# Patient Record
Sex: FEMALE | Race: WHITE | Employment: OTHER | URBAN - METROPOLITAN AREA
[De-identification: names, ages, dates, MRNs, and addresses within clinical notes are randomized per-mention and may not be internally consistent; named-entity substitution may affect disease eponyms.]

---

## 2021-07-19 ENCOUNTER — VIRTUAL VISIT (OUTPATIENT)
Dept: PRIMARY CARE CLINIC | Age: 61
End: 2021-07-19
Payer: COMMERCIAL

## 2021-07-19 ENCOUNTER — NURSE ONLY (OUTPATIENT)
Dept: FAMILY MEDICINE CLINIC | Age: 61
End: 2021-07-19

## 2021-07-19 DIAGNOSIS — R30.0 DYSURIA: ICD-10-CM

## 2021-07-19 DIAGNOSIS — R50.9 FEVER, UNSPECIFIED FEVER CAUSE: ICD-10-CM

## 2021-07-19 DIAGNOSIS — R30.0 DYSURIA: Primary | ICD-10-CM

## 2021-07-19 DIAGNOSIS — R50.9 FEVER, UNSPECIFIED FEVER CAUSE: Primary | ICD-10-CM

## 2021-07-19 DIAGNOSIS — Z20.822 COVID-19 RULED OUT BY LABORATORY TESTING: ICD-10-CM

## 2021-07-19 DIAGNOSIS — N30.01 ACUTE CYSTITIS WITH HEMATURIA: Primary | ICD-10-CM

## 2021-07-19 PROBLEM — M54.50 CHRONIC LOW BACK PAIN: Status: ACTIVE | Noted: 2017-11-01

## 2021-07-19 PROBLEM — G89.29 CHRONIC LOW BACK PAIN: Status: ACTIVE | Noted: 2017-11-01

## 2021-07-19 LAB
BILIRUBIN, POC: ABNORMAL
BLOOD URINE, POC: ABNORMAL
CLARITY, POC: CLEAR
COLOR, POC: YELLOW
GLUCOSE URINE, POC: ABNORMAL
KETONES, POC: ABNORMAL
LEUKOCYTE EST, POC: ABNORMAL
NITRITE, POC: ABNORMAL
PH, POC: 6
PROTEIN, POC: ABNORMAL
SPECIFIC GRAVITY, POC: 1.03
UROBILINOGEN, POC: 0.2

## 2021-07-19 PROCEDURE — 99213 OFFICE O/P EST LOW 20 MIN: CPT | Performed by: NURSE PRACTITIONER

## 2021-07-19 PROCEDURE — 81003 URINALYSIS AUTO W/O SCOPE: CPT | Performed by: NURSE PRACTITIONER

## 2021-07-19 RX ORDER — NITROFURANTOIN 25; 75 MG/1; MG/1
100 CAPSULE ORAL 2 TIMES DAILY
Qty: 10 CAPSULE | Refills: 0 | Status: SHIPPED | OUTPATIENT
Start: 2021-07-19 | End: 2021-07-24

## 2021-07-19 RX ORDER — CELECOXIB 200 MG/1
200 CAPSULE ORAL 2 TIMES DAILY
COMMUNITY
Start: 2020-12-14 | End: 2022-09-22 | Stop reason: ALTCHOICE

## 2021-07-19 SDOH — ECONOMIC STABILITY: FOOD INSECURITY: WITHIN THE PAST 12 MONTHS, THE FOOD YOU BOUGHT JUST DIDN'T LAST AND YOU DIDN'T HAVE MONEY TO GET MORE.: NEVER TRUE

## 2021-07-19 SDOH — ECONOMIC STABILITY: FOOD INSECURITY: WITHIN THE PAST 12 MONTHS, YOU WORRIED THAT YOUR FOOD WOULD RUN OUT BEFORE YOU GOT MONEY TO BUY MORE.: NEVER TRUE

## 2021-07-19 SDOH — ECONOMIC STABILITY: TRANSPORTATION INSECURITY
IN THE PAST 12 MONTHS, HAS LACK OF TRANSPORTATION KEPT YOU FROM MEETINGS, WORK, OR FROM GETTING THINGS NEEDED FOR DAILY LIVING?: NO

## 2021-07-19 SDOH — ECONOMIC STABILITY: TRANSPORTATION INSECURITY
IN THE PAST 12 MONTHS, HAS THE LACK OF TRANSPORTATION KEPT YOU FROM MEDICAL APPOINTMENTS OR FROM GETTING MEDICATIONS?: NO

## 2021-07-19 ASSESSMENT — ENCOUNTER SYMPTOMS
TROUBLE SWALLOWING: 0
VOMITING: 0
DIARRHEA: 0
ABDOMINAL DISTENTION: 0
EYE PAIN: 0
EYE ITCHING: 0
VOICE CHANGE: 0
COUGH: 0
SORE THROAT: 0
CHEST TIGHTNESS: 0
NAUSEA: 0
EYE DISCHARGE: 0
SINUS PAIN: 0
ABDOMINAL PAIN: 0
EYE REDNESS: 0
SINUS PRESSURE: 0
SHORTNESS OF BREATH: 0
WHEEZING: 0
RHINORRHEA: 0

## 2021-07-19 ASSESSMENT — PATIENT HEALTH QUESTIONNAIRE - PHQ9
SUM OF ALL RESPONSES TO PHQ QUESTIONS 1-9: 0
2. FEELING DOWN, DEPRESSED OR HOPELESS: 0
SUM OF ALL RESPONSES TO PHQ9 QUESTIONS 1 & 2: 0
1. LITTLE INTEREST OR PLEASURE IN DOING THINGS: 0
SUM OF ALL RESPONSES TO PHQ QUESTIONS 1-9: 0
SUM OF ALL RESPONSES TO PHQ QUESTIONS 1-9: 0

## 2021-07-19 ASSESSMENT — SOCIAL DETERMINANTS OF HEALTH (SDOH): HOW HARD IS IT FOR YOU TO PAY FOR THE VERY BASICS LIKE FOOD, HOUSING, MEDICAL CARE, AND HEATING?: NOT HARD AT ALL

## 2021-07-19 NOTE — LETTER
NOTIFICATION RETURN TO WORK / SCHOOL    7/19/2021    Ms. Juan Herrmann [id number]      To Whom It May Concern:    Merline Lolling was tested for COVID-19 on 7/19, and we are awaiting results. She needs to remain off work until results are back. If there are questions or concerns, please have the patient contact our office.         Sincerely,      Patricia Bledsoe, GIO - CNP

## 2021-07-19 NOTE — PATIENT INSTRUCTIONS
Patient Education        Learning About Fever  What is a fever? A fever is a high body temperature. It's one way your body fights being sick. A fever shows that the body is responding to infection or other illnesses, both minor and severe. A fever is a symptom, not an illness by itself. A fever can be a sign that you are ill, but most fevers are not caused by a serious problem. You may have a fever with a minor illness, such as a cold. But sometimes a very serious infection may cause little or no fever. It is important to look at other symptoms, other conditions you have, and how you feel in general. In children, notice how they act and see what symptoms they complain of. What is a normal body temperature? A normal body temperature is about 98. 6ºF. Some people have a normal temperature that is a little higher or a little lower than this. Your temperature may be a little lower in the morning than it is later in the day. It may go up during hot weather or when you exercise, wear heavy clothes, or take a hot bath. Your temperature may also be different depending on how you take it. A temperature taken in the mouth (oral) or under the arm may be a little lower than your core temperature (rectal). What is a fever temperature? A core temperature of 100.4°F or above is considered a fever. What can cause a fever? A fever may be caused by:  · Infections. This is the most common cause of a fever. Examples of infections that can cause a fever include the flu, a kidney infection, or pneumonia. · Some medicines. · Severe trauma or injury, such as a heart attack, stroke, heatstroke, or burns. · Other medical conditions, such as arthritis and some cancers. How can you treat a fever at home? · Ask your doctor if you can take an over-the-counter pain medicine, such as acetaminophen (Tylenol), ibuprofen (Advil, Motrin), or naproxen (Aleve). Be safe with medicines.  Read and follow all instructions on the label.  · To prevent dehydration, drink plenty of fluids. Choose water and other caffeine-free clear liquids until you feel better. If you have kidney, heart, or liver disease and have to limit fluids, talk with your doctor before you increase the amount of fluids you drink. Follow-up care is a key part of your treatment and safety. Be sure to make and go to all appointments, and call your doctor if you are having problems. It's also a good idea to know your test results and keep a list of the medicines you take. Where can you learn more? Go to https://AuthypeEpivioseb.99designs. org and sign in to your [employer] account. Enter A854 in the [facility] box to learn more about \"Learning About Fever. \"     If you do not have an account, please click on the \"Sign Up Now\" link. Current as of: October 19, 2020               Content Version: 12.9  © 2006-2021 [employer]. Care instructions adapted under license by [facility] ([facility]). If you have questions about a medical condition or this instruction, always ask your healthcare professional. [place] [address] any warranty or liability for your use of this information. Patient Education        Painful Urination (Dysuria): Care Instructions  Your Care Instructions  Burning pain with urination (dysuria) is a common symptom of a urinary tract infection or other urinary problems. The bladder may become inflamed. This can cause pain when the bladder fills and empties. You may also feel pain if the tube that carries urine from the bladder to the outside of the body (urethra) gets irritated or infected. Sexually transmitted infections (STIs) also may cause pain when you urinate. Sometimes the pain can be caused by things other than an infection. The urethra can be irritated by soaps, perfumes, or foreign objects in the urethra. Kidney stones can cause pain when they pass through the urethra. The cause may be hard to find.  You may need tests. Treatment for painful urination depends on the cause. Follow-up care is a key part of your treatment and safety. Be sure to make and go to all appointments, and call your doctor if you are having problems. It's also a good idea to know your test results and keep a list of the medicines you take. How can you care for yourself at home? · Drink extra water for the next day or two. This will help make the urine less concentrated. (If you have kidney, heart, or liver disease and have to limit fluids, talk with your doctor before you increase the amount of fluids you drink.)  · Avoid drinks that are carbonated or have caffeine. They can irritate the bladder. · Urinate often. Try to empty your bladder each time. For women:  · Urinate right after you have sex. · After going to the bathroom, wipe from front to back. · Avoid douches, bubble baths, and feminine hygiene sprays. And avoid other feminine hygiene products that have deodorants. When should you call for help? Call your doctor now or seek immediate medical care if:    · You have new symptoms, such as fever, nausea, or vomiting.     · You have new or worse symptoms of a urinary problem. For example:  ? You have blood or pus in your urine. ? You have chills or body aches. ? It hurts worse to urinate. ? You have groin or belly pain. ? You have pain in your back just below your rib cage (the flank area). Watch closely for changes in your health, and be sure to contact your doctor if you have any problems. Where can you learn more? Go to https://engageSimplyianSunPods.Greendizer. org and sign in to your [employer] account. Enter P450 in the [facility] box to learn more about \"Painful Urination (Dysuria): Care Instructions. \"     If you do not have an account, please click on the \"Sign Up Now\" link. Current as of: February 10, 2021               Content Version: 12.9  © 6953-8651 Healthwise, Incorporated.    Care instructions adapted under license by [facility] ([facility]). If you have questions about a medical condition or this instruction, always ask your healthcare professional. Jessica Croft any warranty or liability for your use of this information. We'll call with COVID-19 results  Results will also be available on your [facility] account, if activated  Things to Know:   - Do not leave home except to get medical care while waiting for your results  - Testing does not change treatment--> there is no medication that treats COVID-19  - Drink plenty of fluids and rest as much as needed  - May take over the counter medicine such as ibuprofen (aka Motrin/ Advil) or acetaminophen (aka Tylenol) for pain or fever, follow directions on label  - Continually monitor symptoms + contact a medical provider if symptoms are worsening, such as difficulty breathing  - Avoid exposure to environmental irritants/ allergens where possible    - Practice social distancing and wear a face mask when leaving home is necessary  - Symptoms may develop 2 days to 2 weeks following exposure to the virus- if you are exposed after testing, or if you were in the incubation period when tested, you could become ill with COVID-19 later    Keep a low threshold to go to ER or call 9-1-1 for new or suddenly worsening symptoms --> trouble breathing, high fevers that are unresponsive to fever-reducing medications, difficulty staying awake, vomiting/ unable to keep food or fluids down, any other symptoms that you think could be an emergency. Patient Education        Painful Urination (Dysuria): Care Instructions  Your Care Instructions  Burning pain with urination (dysuria) is a common symptom of a urinary tract infection or other urinary problems. The bladder may become inflamed. This can cause pain when the bladder fills and empties.  You may also feel pain if the tube that carries urine from the bladder to the outside of the body (urethra) gets irritated or infected. Sexually transmitted infections (STIs) also may cause pain when you urinate. Sometimes the pain can be caused by things other than an infection. The urethra can be irritated by soaps, perfumes, or foreign objects in the urethra. Kidney stones can cause pain when they pass through the urethra. The cause may be hard to find. You may need tests. Treatment for painful urination depends on the cause. Follow-up care is a key part of your treatment and safety. Be sure to make and go to all appointments, and call your doctor if you are having problems. It's also a good idea to know your test results and keep a list of the medicines you take. How can you care for yourself at home? · Drink extra water for the next day or two. This will help make the urine less concentrated. (If you have kidney, heart, or liver disease and have to limit fluids, talk with your doctor before you increase the amount of fluids you drink.)  · Avoid drinks that are carbonated or have caffeine. They can irritate the bladder. · Urinate often. Try to empty your bladder each time. For women:  · Urinate right after you have sex. · After going to the bathroom, wipe from front to back. · Avoid douches, bubble baths, and feminine hygiene sprays. And avoid other feminine hygiene products that have deodorants. When should you call for help? Call your doctor now or seek immediate medical care if:    · You have new symptoms, such as fever, nausea, or vomiting.     · You have new or worse symptoms of a urinary problem. For example:  ? You have blood or pus in your urine. ? You have chills or body aches. ? It hurts worse to urinate. ? You have groin or belly pain. ? You have pain in your back just below your rib cage (the flank area). Watch closely for changes in your health, and be sure to contact your doctor if you have any problems. Where can you learn more? Go to https://jagdish.health-partners. org and sign in to your [employer] account. Enter E904 in the [facility] box to learn more about \"Painful Urination (Dysuria): Care Instructions. \"     If you do not have an account, please click on the \"Sign Up Now\" link. Current as of: February 10, 2021               Content Version: 12.9  © 5343-7187 Healthwise, Incorporated. Care instructions adapted under license by [facility] ([facility]). If you have questions about a medical condition or this instruction, always ask your healthcare professional. [address] any warranty or liability for your use of this information.

## 2021-07-19 NOTE — PROGRESS NOTES
TELEHEALTH EVALUATION -- Audio/Visual (During XHKIW- public health emergency)    -   Cadence Min is a 64 y.o. female being evaluated by a Virtual Visit (video visit) encounter to address concerns as mentioned above. A caregiver was present when appropriate. Due to this being a TeleHealth encounter (During BQSYE-58 public health emergency), evaluation of the following organ systems was limited: Vitals/Constitutional/EENT/Resp/CV/GI//MS/Neuro/Skin/Heme-Lymph-Imm. Pursuant to the emergency declaration under the [facility], [address] [place] [address] and the [employer] Act, this Virtual Visit was conducted with patient's (and/or legal guardian's) consent, to reduce the patient's risk of exposure to COVID-19 and provide necessary medical care. The patient (and/or legal guardian) has also been advised to contact this office for worsening conditions or problems, and seek emergency medical treatment and/or call 911 if deemed necessary. Patient was contacted and agreed to proceed with a virtual visit via Doxy. me  The risks and benefits of converting to a virtual visit were discussed in light of the current infectious disease epidemic. Patient also understood that insurance coverage and co-pays are up to their individual insurance plans. Patient was located at their home. Provider was located at their office. 2021  Cadence Min (:  1960) has requested an audio/video evaluation for the following concern(s):    HPI  Patient is on VV for c/o chills and fever since Saturday. Fever .8. Says she has no other URI symptoms. She denies cough or SOB. Denies loss of taste and smell. She has no GI symptoms. She has had low back aches the last few days and frequency with no other urinary or vaginal symptoms. Says  has had GI symptoms and fever.    Says she has not had Covid that she is aware of and she has not been vaccinated. Review of Systems   Constitutional: Positive for chills, diaphoresis, fatigue and fever. Negative for activity change, appetite change and unexpected weight change. HENT: Negative for congestion, dental problem, ear discharge, ear pain, postnasal drip, rhinorrhea, sinus pressure, sinus pain, sneezing, sore throat, trouble swallowing and voice change. Eyes: Negative for pain, discharge, redness and itching. Respiratory: Negative for cough, chest tightness, shortness of breath and wheezing. Cardiovascular: Negative for chest pain. Gastrointestinal: Negative for abdominal distention, abdominal pain, diarrhea, nausea and vomiting. Genitourinary: Positive for frequency and pelvic pain. Negative for decreased urine volume, difficulty urinating, dyspareunia, dysuria, enuresis, flank pain, genital sores, hematuria, menstrual problem, urgency, vaginal bleeding, vaginal discharge and vaginal pain. Musculoskeletal: Positive for myalgias. Negative for arthralgias. Neurological: Negative for dizziness, weakness, light-headedness and headaches. Psychiatric/Behavioral: Negative for confusion. Prior to Visit Medications    Medication Sig Taking? Authorizing Provider   celecoxib (CELEBREX) 200 MG capsule Take 200 mg by mouth 2 times daily Yes Historical Provider, MD       Past Medical History:   Diagnosis Date    Chronic back pain      History reviewed. No pertinent surgical history.   Social History     Socioeconomic History    Marital status:      Spouse name: Not on file    Number of children: Not on file    Years of education: Not on file    Highest education level: Not on file   Occupational History    Not on file   Tobacco Use    Smoking status: Never Smoker    Smokeless tobacco: Never Used   Substance and Sexual Activity    Alcohol use: Never    Drug use: Never    Sexual activity: Not on file   Other Topics Neck: [x] No visualized mass     Pulmonary/Chest: [x] Respiratory effort normal.  [x] No visualized signs of difficulty breathing or respiratory distress        [] Abnormal-      Musculoskeletal:   [x] Normal gait with no signs of ataxia         [x] Normal range of motion of neck        [] Abnormal-       Neurological:       [x] No Facial Asymmetry (Cranial nerve 7 motor function) (limited exam to video visit)          [x] No gaze palsy        [] Abnormal-         Skin:        [x] No significant exanthematous lesions or discoloration noted on facial skin         [] Abnormal-            Psychiatric:       [x] Normal Affect [x] No Hallucinations        [] Abnormal-     Other pertinent observable physical exam findings-   Results for orders placed or performed in visit on 07/19/21   POCT Urinalysis No Micro (Auto)   Result Value Ref Range    Color, UA yellow     Clarity, UA clear     Glucose, UA POC n     Bilirubin, UA n     Ketones, UA n     Spec Grav, UA 1.030     Blood, UA POC +     pH, UA 6.0     Protein, UA POC +     Urobilinogen, UA 0.2     Leukocytes, UA n     Nitrite, UA sm        ASSESSMENT/PLAN:  Assessment & Plan   Laila Esquivel was seen today for fever. Diagnoses and all orders for this visit:    Fever, unspecified fever cause  -     COVID-19; Future    Dysuria  -     POCT Urinalysis No Micro (Auto)      Orders Placed This Encounter   Procedures    COVID-19     Standing Status:   Future     Standing Expiration Date:   7/19/2022     Scheduling Instructions:      1) Due to current limited availability of the COVID-19 test, tests will be prioritized based on responses to questions above. Testing may be delayed due to volume. 2) Print and instruct patient to adhere to CDC home isolation program. (Link Above)              3) Set up or refer patient for a monitoring program.              4) Have patient sign up for and leverage MyChart (if not previously done).      Order Specific Question:   Is this test for diagnosis or screening? Answer:   Diagnosis of ill patient     Order Specific Question:   Symptomatic for COVID-19 as defined by CDC? Answer:   Yes     Order Specific Question:   Date of Symptom Onset     Answer:   7/17/2021     Order Specific Question:   Hospitalized for COVID-19? Answer:   No     Order Specific Question:   Admitted to ICU for COVID-19? Answer:   No     Order Specific Question:   Employed in healthcare setting? Answer:   No     Order Specific Question:   Resident in a congregate (group) care setting? Answer:   No     Order Specific Question:   Pregnant? Answer:   No     Order Specific Question:   Previously tested for COVID-19? Answer: Yes    POCT Urinalysis No Micro (Auto)     No orders of the defined types were placed in this encounter. There are no discontinued medications. No follow-ups on file. Reviewed with the patient: current clinical status, medications, activities and diet. Side effects, adverse effects of the medication prescribed today, as well as treatment plan/ rationale and result expectations have been discussed with the patient who expresses understanding and desires to proceed. Close follow up to evaluate treatment results and for coordination of care. I have reviewed the patient's medical history in detail and updated the computerized patient record. Patient identification was verified at the start of the visit: Yes    Total time spent on this encounter: Not billed by time      --GIO Hoover CNP on 7/19/2021 at 4:16 PM    An electronic signature was used to authenticate this note.

## 2021-07-20 LAB — SARS-COV-2, PCR: NOT DETECTED

## 2021-07-21 ENCOUNTER — TELEPHONE (OUTPATIENT)
Dept: FAMILY MEDICINE CLINIC | Age: 61
End: 2021-07-21

## 2021-07-21 NOTE — TELEPHONE ENCOUNTER
Hi there I did not see the pt but if she is still having fevers she is still considered infectious and until she is fever free for 24 hrs without any Tylenol or Ibuprofen then she may return back to work

## 2021-07-21 NOTE — TELEPHONE ENCOUNTER
Pt is calling for a return to work note. She was seen by Eva Gannon at the [facility] in. Pt is taking the antibiotic. She is still having fevers and think its from the UTI. Pt was tested for COVID and it was negative. When can she return back to work? Pt wants to go back to work tomorrow. She works at the [employer]. Eva Gannon is off today at all locations, are you able to do the return to work note?      Pt# [phone]

## 2021-07-22 ENCOUNTER — TELEPHONE (OUTPATIENT)
Dept: PRIMARY CARE CLINIC | Age: 61
End: 2021-07-22

## 2021-07-22 LAB
ORGANISM: ABNORMAL
URINE CULTURE, ROUTINE: ABNORMAL

## 2021-07-27 NOTE — TELEPHONE ENCOUNTER
She is asking for her urine culture results.
This was addressed by ZULAY on the urine culture result.
Detail Level: Simple
Detail Level: Detailed

## 2021-08-02 NOTE — PROGRESS NOTES
8/3/2021    Asim Oliveira (:  1960) is a 64 y.o. female, here for evaluation of the following medical concerns:  Chief Complaint   Patient presents with   [place] Establish Care    Abdominal Pain     Abdominal pain left side, and lower left back pain. Unsure if she still has a UTI.  Sweats     Night.  Mole     Left Buttock, recently changed in size and color.  Health Maintenance     Yes for colonoscopy, yes for mammogram (has implants), and yes for CT.  Anxiety    Letter for School/Work     Not to have to work more than 8hrs a day, or 40 hrs a week. HPI  Establishing care  [facility], last seen about 1 year ago  Past medical history: none    UTI  Diagnosed 2 weeks ago  Finished abx about a week ago  Denied dysuria, frequency or urgency  Only had flank pain when she was diagnosed  Still having back pain  No injury to the back  Does work at [employer] and lifts a lot   Has not been doing any NSAIDs or icing    Night sweats  Occurring for one year  Almost every night  No symptoms during the daytime  Drenched in sweat  Hysterectomy when she was 30 - with ovary removal    Skin lesion  Left buttocks  Feels like lesion has been changing in color over the last few months    Anxiety  States she has had more issues that she is gotten older  Anxiety is not on a daily basis  Was previously on Xanax as needed    Review of Systems   Constitutional: Negative for chills, fatigue and fever. Night sweats   HENT: Negative for congestion, sinus pressure and sore throat. Respiratory: Negative for cough and shortness of breath. Cardiovascular: Negative for chest pain and palpitations. Gastrointestinal: Negative for abdominal pain and vomiting. Musculoskeletal: Negative for arthralgias and myalgias. Left-sided lumbar paraspinal muscular pain   Skin: Negative for rash and wound.         Skin lesion left gluteal region   Neurological: Negative for speech difficulty and light-headedness. Psychiatric/Behavioral: Negative for suicidal ideas. The patient is not nervous/anxious. Prior to Visit Medications    Medication Sig Taking? Authorizing Provider   hydrOXYzine (VISTARIL) 25 MG capsule Take 1 capsule by mouth 3 times daily as needed for Anxiety Yes Arch Roles, DO   celecoxib (CELEBREX) 200 MG capsule Take 200 mg by mouth 2 times daily Yes Historical Provider, MD        No Known Allergies    Past Medical History:   Diagnosis Date    Chronic back pain        Past Surgical History:   Procedure Laterality Date     SECTION      HYSTERECTOMY, TOTAL ABDOMINAL      OVARY REMOVAL         Social History     Socioeconomic History    Marital status:      Spouse name: Not on file    Number of children: Not on file    Years of education: Not on file    Highest education level: Not on file   Occupational History    Not on file   Tobacco Use    Smoking status: Current Every Day Smoker     Packs/day: 1.00     Years: 30.00     Pack years: 30.00    Smokeless tobacco: Never Used   Substance and Sexual Activity    Alcohol use: Never    Drug use: Never    Sexual activity: Not on file   Other Topics Concern    Not on file   Social History Narrative    Not on file     Social Determinants of Health     Financial Resource Strain: Low Risk     Difficulty of Paying Living Expenses: Not hard at all   Food Insecurity: No Food Insecurity    Worried About Running Out of Food in the Last Year: Never true    Pedro of Food in the Last Year: Never true   Transportation Needs: No Transportation Needs    Lack of Transportation (Medical): No    Lack of Transportation (Non-Medical):  No   Physical Activity:     Days of Exercise per Week:     Minutes of Exercise per Session:    Stress:     Feeling of Stress :    Social Connections:     Frequency of Communication with Friends and Family:     Frequency of Social Gatherings with Friends and Family:     Attends Protestant Services:     Active Member of Clubs or Organizations:     Attends Club or Organization Meetings:     Marital Status:    Intimate Partner Violence:     Fear of Current or Ex-Partner:     Emotionally Abused:     Physically Abused:     Sexually Abused:         Family History   Problem Relation Age of Onset    Heart Attack Father     Stroke Father     Prostate Cancer Brother     Cancer Maternal Grandmother     Stroke Maternal Grandmother     Cancer Maternal Grandfather     Diabetes Maternal Grandfather     Kidney Disease Neg Hx        Vitals:    08/03/21 1514   BP: 122/80   Pulse: 84   Temp: 96.2 °F (35.7 °C)   SpO2: 96%   Weight: 132 lb (59.9 kg)   Height: 5' 7\" (1.702 m)       Estimated body mass index is 20.67 kg/m² as calculated from the following:    Height as of this encounter: 5' 7\" (1.702 m). Weight as of this encounter: 132 lb (59.9 kg). No results for input(s): WBC, RBC, HGB, HCT, MCV, MCH, MCHC, RDW, PLT, MPV in the last 72 hours. No results for input(s): NA, K, CL, CO2, BUN, CREATININE, GLUCOSE, CALCIUM, PROT, LABALBU, BILITOT, ALKPHOS, AST, ALT in the last 72 hours. No results found for: LABA1C    No results found. Physical Exam  Constitutional:       Appearance: Normal appearance. She is normal weight. HENT:      Head: Normocephalic and atraumatic. Eyes:      Extraocular Movements: Extraocular movements intact. Conjunctiva/sclera: Conjunctivae normal.   Cardiovascular:      Rate and Rhythm: Normal rate and regular rhythm. Pulses: Normal pulses. Heart sounds: Normal heart sounds. Pulmonary:      Effort: Pulmonary effort is normal.      Breath sounds: Normal breath sounds. No wheezing or rales. Musculoskeletal:         General: Tenderness (Left lumbar paraspinal musculature) present. Skin:     General: Skin is warm. Capillary Refill: Capillary refill takes less than 2 seconds. Findings: No rash.       Comments: Left lower buttocks: Dark brown to black irregularly appearing macule   Neurological:      Mental Status: She is alert. Psychiatric:         Mood and Affect: Mood normal.         Thought Content: Thought content normal.         Judgment: Judgment normal.         ASSESSMENT/PLAN:  1. Anxiety  -Patient does not want to be on any daily medications to help with anxiety as she states her anxiety occurs sporadically  -We will try Vistaril as needed   - Declined psychology referral    - TSH with Reflex; Future  - hydrOXYzine (VISTARIL) 25 MG capsule; Take 1 capsule by mouth 3 times daily as needed for Anxiety  Dispense: 42 capsule; Refill: 0    2. Pain in left lumbar region of back  -UA nonsuggestive of infection  -Patient does have pain with palpation in the left lower lumbar paraspinal musculature  -Recommended rice therapy    3. Lower abdominal pain  - POCT Urinalysis No Micro (Auto)  - Culture, Urine; Future    4. Dysuria  - POCT Urinalysis No Micro (Auto)  - Culture, Urine; Future    5. Changing pigmented skin lesion  -We will set up patient for scoop biopsy for changing pigmented lesion    6. Night sweats  -We will get basic lab work to see if any other underlying causes    7. Preventative health care  - CBC; Future  - Comprehensive Metabolic Panel; Future  - Lipid Panel; Future  - TSH with Reflex; Future    8. Tobacco abuse disorder  - CT lung screen [Initial/Annual]; Future    9. Screening for colon cancer  [facility]    10. Breast cancer screening by mammogram  - SUNIL DIGITAL SCREEN W OR WO CAD BILATERAL; Future    11. Encounter to establish care with new doctor      ---------------------------------------------------------------------  Side effects, adverse effects of the medication prescribed today, as well as treatment plan/ rationale and result expectations have been discussed with the patient who expresses understanding and desires to proceed.      Close follow up to evaluate treatment results and for coordination of care.  I have reviewed the patient's medical history in detail and updated the computerized patient record. As always, patient is advised that if symptoms worsen in any way they will proceed to the nearest emergency room. --------------------------------------------------------------------    Return in about 2 weeks (around 8/17/2021) for shave bx, f/u mood - 30 min. An  electronic signature was used to authenticate this note.     --Brigido Herrera DO on 8/3/2021 at 3:55 PM

## 2021-08-03 ENCOUNTER — OFFICE VISIT (OUTPATIENT)
Dept: FAMILY MEDICINE CLINIC | Age: 61
End: 2021-08-03
Payer: COMMERCIAL

## 2021-08-03 VITALS
HEIGHT: 67 IN | WEIGHT: 132 LBS | DIASTOLIC BLOOD PRESSURE: 80 MMHG | HEART RATE: 84 BPM | OXYGEN SATURATION: 96 % | TEMPERATURE: 96.2 F | SYSTOLIC BLOOD PRESSURE: 122 MMHG | BODY MASS INDEX: 20.72 KG/M2

## 2021-08-03 DIAGNOSIS — R10.30 LOWER ABDOMINAL PAIN: ICD-10-CM

## 2021-08-03 DIAGNOSIS — R61 NIGHT SWEATS: ICD-10-CM

## 2021-08-03 DIAGNOSIS — Z01.818 PRE-OP TESTING: Primary | ICD-10-CM

## 2021-08-03 DIAGNOSIS — Z00.00 PREVENTATIVE HEALTH CARE: ICD-10-CM

## 2021-08-03 DIAGNOSIS — Z76.89 ENCOUNTER TO ESTABLISH CARE WITH NEW DOCTOR: ICD-10-CM

## 2021-08-03 DIAGNOSIS — M54.50 PAIN IN LEFT LUMBAR REGION OF BACK: ICD-10-CM

## 2021-08-03 DIAGNOSIS — R30.0 DYSURIA: ICD-10-CM

## 2021-08-03 DIAGNOSIS — Z12.11 SCREENING FOR COLON CANCER: ICD-10-CM

## 2021-08-03 DIAGNOSIS — F41.9 ANXIETY: Primary | ICD-10-CM

## 2021-08-03 DIAGNOSIS — Z72.0 TOBACCO ABUSE DISORDER: ICD-10-CM

## 2021-08-03 DIAGNOSIS — L81.9 CHANGING PIGMENTED SKIN LESION: ICD-10-CM

## 2021-08-03 DIAGNOSIS — Z12.31 BREAST CANCER SCREENING BY MAMMOGRAM: ICD-10-CM

## 2021-08-03 LAB
BILIRUBIN, POC: NORMAL
BLOOD URINE, POC: NORMAL
CLARITY, POC: CLEAR
COLOR, POC: YELLOW
GLUCOSE URINE, POC: NORMAL
KETONES, POC: NORMAL
LEUKOCYTE EST, POC: NORMAL
NITRITE, POC: NORMAL
PH, POC: 6
PROTEIN, POC: NORMAL
SPECIFIC GRAVITY, POC: 1.02
UROBILINOGEN, POC: 3.5

## 2021-08-03 PROCEDURE — 81003 URINALYSIS AUTO W/O SCOPE: CPT | Performed by: STUDENT IN AN ORGANIZED HEALTH CARE EDUCATION/TRAINING PROGRAM

## 2021-08-03 PROCEDURE — 99204 OFFICE O/P NEW MOD 45 MIN: CPT | Performed by: STUDENT IN AN ORGANIZED HEALTH CARE EDUCATION/TRAINING PROGRAM

## 2021-08-03 RX ORDER — HYDROXYZINE PAMOATE 25 MG/1
25 CAPSULE ORAL 3 TIMES DAILY PRN
Qty: 42 CAPSULE | Refills: 0 | Status: SHIPPED | OUTPATIENT
Start: 2021-08-03 | End: 2021-08-17 | Stop reason: SINTOL

## 2021-08-03 ASSESSMENT — ENCOUNTER SYMPTOMS
COUGH: 0
VOMITING: 0
SORE THROAT: 0
ABDOMINAL PAIN: 0
SHORTNESS OF BREATH: 0
SINUS PRESSURE: 0

## 2021-08-04 ENCOUNTER — TELEPHONE (OUTPATIENT)
Dept: CASE MANAGEMENT | Age: 61
End: 2021-08-04

## 2021-08-04 NOTE — TELEPHONE ENCOUNTER
Physician documentation on smoking history and CT Lung Screening reviewed. All required documentation complete. Patient is a current smoker with a 30 pack year history ( 1 ppd x 30 years) per physician documentation.

## 2021-08-05 LAB — URINE CULTURE, ROUTINE: NORMAL

## 2021-08-16 ENCOUNTER — HOSPITAL ENCOUNTER (OUTPATIENT)
Dept: CT IMAGING | Age: 61
Discharge: HOME | End: 2021-08-18
Payer: COMMERCIAL

## 2021-08-16 DIAGNOSIS — Z72.0 TOBACCO ABUSE DISORDER: ICD-10-CM

## 2021-08-16 PROCEDURE — 71271 CT THORAX LUNG CANCER SCR C-: CPT

## 2021-08-16 NOTE — PROGRESS NOTES
HPI  Skin lesion  Left gluteal region   Feels like lesion has been changing in color over the last few months     Anxiety  States she has had more issues that she is gotten older  Anxiety is not on a daily basis  Was previously on Xanax as needed    8/3  Started on Vistaril as needed     Follow up  Took vistaril once and she felt weird on it    Health maintenance  Recent lab work including TSH within normal limits     Current Outpatient Medications on File Prior to Visit   Medication Sig Dispense Refill    celecoxib (CELEBREX) 200 MG capsule Take 200 mg by mouth 2 times daily       No current facility-administered medications on file prior to visit. Patient has no known allergies. Review of Systems   Constitutional: Negative for chills, fatigue and fever. HENT: Negative for congestion, sinus pressure and sore throat. Respiratory: Negative for cough and shortness of breath. Cardiovascular: Negative for chest pain and palpitations. Gastrointestinal: Negative for abdominal pain and vomiting. Musculoskeletal: Negative for arthralgias and myalgias. Skin: Negative for rash and wound. Skin lesion left gluteal region   Neurological: Negative for speech difficulty and light-headedness. Psychiatric/Behavioral: Negative for suicidal ideas. The patient is not nervous/anxious. /76   Pulse 83   Temp 97.3 °F (36.3 °C)   Ht 5' 7\" (1.702 m)   Wt 132 lb (59.9 kg)   SpO2 98%   BMI 20.67 kg/m²      Physical Exam  Constitutional:       Appearance: Normal appearance. She is normal weight. HENT:      Head: Normocephalic and atraumatic. Eyes:      Extraocular Movements: Extraocular movements intact. Conjunctiva/sclera: Conjunctivae normal.   Cardiovascular:      Rate and Rhythm: Normal rate and regular rhythm. Pulses: Normal pulses. Heart sounds: Normal heart sounds. Pulmonary:      Effort: Pulmonary effort is normal.      Breath sounds: Normal breath sounds.  No wheezing or rales. Skin:     General: Skin is warm. Capillary Refill: Capillary refill takes less than 2 seconds. Findings: No rash. Comments: Left upper buttocks: Dark brown to black irregularly appearing macule 4mm x 5mm   Neurological:      Mental Status: She is alert. Psychiatric:         Mood and Affect: Mood normal.         Thought Content: Thought content normal.         Judgment: Judgment normal.       Informed consent was given by the patient. Risks including infection, bleeding and scarring were discussed. No guarantee how the scar will look. Patient skin was prepped with alcohol. Wound was anesthetized using 0.5\". \"1.0 cc of 1% lidocaine with epinephrine for anesthesia. A Dermablade was used to obtain the shave biopsy. Drysol was used at the base of site. Post op wound care instructions were given to the patient. He/She will f/u in 2 weeks or sooner if needed for problems. Femi Miles was seen today for 2 week follow-up, anxiety and procedure. Diagnoses and all orders for this visit:    Changing pigmented skin lesion  -     Specimen to Pathology Outpatient; Future    Anxiety  -Tried Vistaril once but did not like the way it made her feel  -Still awaiting last PCPs notes, patient states she has been on several medications for anxiety in the past and would like to wait till we get the results from her last primary care doctor before starting anything else  -She did have a panic attack today at work and states if the panic attacks become more frequent she will make a sooner follow-up      Return in about 3 months (around 11/17/2021) for Follow up chronic conditions. DO NOT USE TRIPLE ANTIBIOTIC ON THE WOUND.     Patient Instructions   Incision/ Shave biopsy/ Laceration repair    - Wash your hands with soap and water before cleaning the surgical area, this is the most common cause of infection  - Clean the surgical area with antibacterial soap and water once daily  - Keep surgical site moist with Vaseline and apply a fresh bandage daily until a solid scab forms or if the wound is at risk for trauma or dirt. - Follow up immediately if any growing redness (minimal redness or pale pink is normal along wound edges) surrounds the surgical site or if drainage occurs at the surgical site. - Once a solid scab forms the bandage is no longer needed. A wet scab can look yellow. This is not infection, just moisture. - Once the lesion is healed be sure to apply sunscreen to the area to prevent burn of the new and more delicate skin during the first 6 months of healing.    - If the scar begins to be raised you may massage the area firmly twice a day to help break down scar tissue and help the area become a flat scar. There is some evidence that Mederma applied to a scar daily for the first few months can help shrink and fade it more quickly then without intervention.           - Clean surgical area with antibacterial soap and water once daily.    - Keep the surgical site moist with vaseline and apply a fresh bandage daily until a solid scab forms or if the wound is at risk for trauma or dirt. - Follow up immediately if any growing redness (minimal redness or pale pink is normal along wound edges) surrounds the surgical site or if drainage occurs at surgical site. Once a solid scab forms a bandage is no longer needed. - Once the lesion is healed, be sure to apply sunscreen to the area to prevent burn of the newer and more delicate skin during the first 6 months of healing.    - If the scar begins to be raised you may massage the area firmly twice a day to help break down scar tissue and help the area become a flat scar. There is some evidence that Mederma applied to a scar daily for the first few months can help shrink and fade it more quickly then without intervention. Skin Lesion Removal: What to Expect at Home  Your Recovery  After your procedure, you should not have much pain. But some soreness, swelling, or bruising is normal. Your doctor may recommend over-the-counter medicines to help with any discomfort. Most people can return to their normal routine the same day of their procedure. How quickly your wound heals depends on the size of your wound and the type of procedure you had. Most wounds take 1 to 3 weeks to heal. If you had laser surgery, your skin may change color and then slowly return to its normal color. You may need only a bandage, or you may need stitches. If you had stitches, your doctor will probably remove them 5 to 14 days later. If you have the type of stitches that dissolve, they do not have to be removed. They will disappear on their own. This care sheet gives you a general idea about how long it will take for you to recover. But each person recovers at a different pace. Follow the steps below to get better as quickly as possible. How can you care for yourself at home? Activity    · For the first few days, try not to bump or knock your wound. · Depending on where your wound is, you may need to avoid strenuous exercise for 2 weeks after the procedure or until your doctor says it is okay. · If you have had a lesion removed from your face, do not use makeup near your wound until you have your stitches taken out. · Ask your doctor when it is okay to shower, bathe, or swim. Medicines    · Your doctor will tell you if and when you can restart your medicines. He or she will also give you instructions about taking any new medicines. · If you take blood thinners, such as warfarin (Coumadin), clopidogrel (Plavix), or aspirin, be sure to talk to your doctor. He or she will tell you if and when to start taking those medicines again. Make sure that you understand exactly what your doctor wants you to do. · Be safe with medicines. Take pain medicines exactly as directed.   ? If the doctor gave you a prescription medicine for pain, take it as prescribed. ? If you are not taking a prescription pain medicine, ask your doctor if you can take an over-the-counter medicine. Wound care    · If your doctor told you how to care for your incision, follow your doctor's instructions. If you did not get instructions, follow this general advice:  ? Keep the wound bandaged and dry for the first day. ? After the first 24 to 48 hours, wash around the wound with clean water 2 times a day. Don't use hydrogen peroxide or alcohol, which can slow healing. ? You may cover the wound with a thin layer of petroleum jelly, such as Vaseline, and a nonstick bandage. ? Apply more petroleum jelly and replace the bandage as needed. · If you have stitches, you may get other instructions. · If a scab forms, do not pull it off. Let it fall off on its own. Wounds heal faster if no scab forms. Washing the area every day and using petroleum jelly will help prevent a scab from forming. · If the wound bleeds, put direct pressure on it with a clean cloth until the bleeding stops. · If you had a growth \"frozen\" off, you may get a blister. Do not break it. Let it dry up on its own. It is common for the blister to fill with blood. You do not need to do anything about this, but if it becomes too painful, call your doctor. · Avoid the sun until your stitches are removed. Follow-up care is a key part of your treatment and safety. Be sure to make and go to all appointments, and call your doctor if you are having problems. It's also a good idea to know your test results and keep a list of the medicines you take. When should you call for help? Call 911 anytime you think you may need emergency care. For example, call if:    · You passed out (lost consciousness). · You have severe trouble breathing. · You have sudden chest pain and shortness of breath, or you cough up blood.     Call your doctor now or seek immediate medical care if:    · You have symptoms of a blood clot in your leg (called a deep vein thrombosis), such as:  ? Pain in the calf, back of the knee, thigh, or groin. ? Redness and swelling in your leg or groin. · You have signs of infection, such as:  ? Increased pain, swelling, warmth, or redness. ? Red streaks leading from the wound. ? Pus draining from the wound. ? A fever. · You have pain that does not get better after you take pain medicine. · You have loose stitches. Watch closely for changes in your health, and be sure to contact your doctor if you have any problems.

## 2021-08-17 ENCOUNTER — PROCEDURE VISIT (OUTPATIENT)
Dept: FAMILY MEDICINE CLINIC | Age: 61
End: 2021-08-17
Payer: COMMERCIAL

## 2021-08-17 VITALS
OXYGEN SATURATION: 98 % | TEMPERATURE: 97.3 F | DIASTOLIC BLOOD PRESSURE: 76 MMHG | SYSTOLIC BLOOD PRESSURE: 124 MMHG | HEIGHT: 67 IN | HEART RATE: 83 BPM | BODY MASS INDEX: 20.72 KG/M2 | WEIGHT: 132 LBS

## 2021-08-17 DIAGNOSIS — F41.9 ANXIETY: ICD-10-CM

## 2021-08-17 DIAGNOSIS — L81.9 CHANGING PIGMENTED SKIN LESION: Primary | ICD-10-CM

## 2021-08-17 PROCEDURE — 99213 OFFICE O/P EST LOW 20 MIN: CPT | Performed by: STUDENT IN AN ORGANIZED HEALTH CARE EDUCATION/TRAINING PROGRAM

## 2021-08-17 PROCEDURE — 11300 SHAVE SKIN LESION 0.5 CM/<: CPT | Performed by: STUDENT IN AN ORGANIZED HEALTH CARE EDUCATION/TRAINING PROGRAM

## 2021-08-17 ASSESSMENT — ENCOUNTER SYMPTOMS
SORE THROAT: 0
COUGH: 0
SINUS PRESSURE: 0
VOMITING: 0
SHORTNESS OF BREATH: 0
ABDOMINAL PAIN: 0

## 2021-08-24 ENCOUNTER — VIRTUAL VISIT (OUTPATIENT)
Dept: FAMILY MEDICINE CLINIC | Age: 61
End: 2021-08-24
Payer: COMMERCIAL

## 2021-08-24 DIAGNOSIS — C44.91 SKIN CANCER, BASAL CELL: Primary | ICD-10-CM

## 2021-08-24 PROCEDURE — 99213 OFFICE O/P EST LOW 20 MIN: CPT | Performed by: STUDENT IN AN ORGANIZED HEALTH CARE EDUCATION/TRAINING PROGRAM

## 2021-08-24 ASSESSMENT — ENCOUNTER SYMPTOMS
COUGH: 0
ABDOMINAL PAIN: 0
SORE THROAT: 0
SINUS PRESSURE: 0
VOMITING: 0
SHORTNESS OF BREATH: 0

## 2021-08-24 NOTE — PATIENT INSTRUCTIONS
X-rays or other types of radiation to kill cancer cells. It may be done if surgery isn't an option. Other treatment options include chemotherapy cream and photodynamic therapy. If your doctor removes the cancer, he or she will send it to a lab. The lab makes sure it is a basal cell cancer and that it all was removed. If cancer is still there, you may need more treatment. How can you prevent it? · Always wear a wide-brimmed hat and long sleeves and pants when you are outdoors. · Avoid the sun between 10 a.m. and 4 p.m., which is the peak time for UV rays. · Always wear sunscreen on exposed skin. Make sure to use a broad-spectrum sunscreen that has a sun protection factor (SPF) of 30 or higher. Use it every day, even when it is cloudy. · Do not use tanning booths or sunlamps. · Use lip balm or cream that has sun protection factor (SPF) to protect your lips from getting sunburned. · Wear sunglasses that block UV rays. When should you call for help? Watch closely for changes in your health, and be sure to contact your doctor if:  · You see a change in your skin, such as a growth or mole that:  ? Grows bigger. This may happen slowly. ? Changes color. ? Changes shape. ? Starts to bleed easily. Follow-up care is a key part of your treatment and safety. Be sure to make and go to all appointments, and call your doctor if you are having problems. It's also a good idea to know your test results and keep a list of the medicines you take. Where can you learn more? Go to https://Lorena Gaxiolaluis felipeewdary.POWWOW. org and sign in to your [employer] account. Enter [phone] in the [facility] box to learn more about \"Learning About Basal Cell Skin Cancer. \"     If you do not have an account, please click on the \"Sign Up Now\" link. Current as of: December 17, 2020               Content Version: 12.9  © 3971-0605 [employer]. Care instructions adapted under license by [facility] ([facility]).  If you have questions about a medical condition or this instruction, always ask your healthcare professional. [place] [address] any warranty or liability for your use of this information.

## 2021-08-24 NOTE — PROGRESS NOTES
2021    TELEHEALTH EVALUATION -- Audio/Visual (During EZTFU-76 public health emergency)    Due to [place] Beams 19 outbreak, patient's office visit was converted to a virtual visit. Patient was contacted and agreed to proceed with a virtual visit via [email or web address]  The risks and benefits of converting to a virtual visit were discussed in light of the current infectious disease epidemic. Patient also understood that insurance coverage and co-pays are up to their individual insurance plans. HPI:  Nixon Mayer (:  1960) has requested an audio/video evaluation for the following concern(s):  Chief Complaint   Patient presents with    Discuss Labs     Skin lesion  Left gluteal region   Feels like lesion has been changing in color over the last few months    Shave biopsy  positive for [facility] with focal squamous differentiation    Review of Systems   Constitutional: Negative for chills, fatigue and fever. HENT: Negative for congestion, sinus pressure and sore throat. Respiratory: Negative for cough and shortness of breath. Cardiovascular: Negative for chest pain and palpitations. Gastrointestinal: Negative for abdominal pain and vomiting. Musculoskeletal: Negative for arthralgias and myalgias. Skin: Negative for rash and wound. Skin lesion left gluteal region   Neurological: Negative for speech difficulty and light-headedness. Psychiatric/Behavioral: Negative for suicidal ideas. The patient is not nervous/anxious. Prior to Visit Medications    Medication Sig Taking?  Authorizing Provider   celecoxib (CELEBREX) 200 MG capsule Take 200 mg by mouth 2 times daily  Historical Provider, MD       Social History     Tobacco Use    Smoking status: Current Every Day Smoker     Packs/day: 1.00     Years: 30.00     Pack years: 30.00    Smokeless tobacco: Never Used   Substance Use Topics    Alcohol use: Never    Drug use: Never      No Known Allergies,   Past Medical History:   Diagnosis Date  Chronic back pain    ,   Past Surgical History:   Procedure Laterality Date     SECTION      HYSTERECTOMY, TOTAL ABDOMINAL      OVARY REMOVAL     ,   Social History     Tobacco Use    Smoking status: Current Every Day Smoker     Packs/day: 1.00     Years: 30.00     Pack years: 30.00    Smokeless tobacco: Never Used   Substance Use Topics    Alcohol use: Never    Drug use: Never   ,   Family History   Problem Relation Age of Onset    Heart Attack Father     Stroke Father     Prostate Cancer Brother     Cancer Maternal Grandmother     Stroke Maternal Grandmother     Cancer Maternal Grandfather     Diabetes Maternal Grandfather     Kidney Disease Neg Hx    ,   There is no immunization history on file for this patient. PHYSICAL EXAMINATION:  [ INSTRUCTIONS:  \"[x]\" Indicates a positive item  \"[]\" Indicates a negative item  -- DELETE ALL ITEMS NOT EXAMINED]  [x] Alert  [] Oriented to person/place/time    [x] No apparent distress  [] Toxic appearing    [] Face flushed appearing [x] Sclera clear  [] Lips are cyanotic      [x] Breathing appears normal  [] Appears tachypneic      [] Rash on visible skin    [x] Cranial Nerves II-XII grossly intact    [x] Motor grossly intact in visible upper extremities    [] Motor grossly intact in visible lower extremities    [x] Normal Mood  [] Anxious appearing    [] Depressed appearing  [] Confused appearing      [] Poor short term memory  [] Poor long term memory    [] OTHER:      Due to this being a TeleHealth encounter, evaluation of the following organ systems is limited: Vitals/Constitutional/EENT/Resp/CV/GI//MS/Neuro/Skin/Heme-Lymph-Imm. ASSESSMENT/PLAN:  1.  Skin cancer, basal cell  - Discussed shave biopsy results with patient, discussed follow up steps for treatment including excision for definitive removal  - Will schedule with Dr. German Caicedo for procedure      Return in about 2 weeks (around 2021) for 30 min excision procedure Dr. German Caicedo-

## 2021-09-01 ENCOUNTER — NURSE ONLY (OUTPATIENT)
Dept: FAMILY MEDICINE CLINIC | Age: 61
End: 2021-09-01

## 2021-09-01 DIAGNOSIS — Z11.52 ENCOUNTER FOR SCREENING FOR COVID-19: Primary | ICD-10-CM

## 2021-09-07 ENCOUNTER — PROCEDURE VISIT (OUTPATIENT)
Dept: FAMILY MEDICINE CLINIC | Age: 61
End: 2021-09-07
Payer: COMMERCIAL

## 2021-09-07 VITALS — BODY MASS INDEX: 20.72 KG/M2 | HEIGHT: 67 IN | WEIGHT: 132 LBS | TEMPERATURE: 96.9 F

## 2021-09-07 DIAGNOSIS — C44.91 SKIN CANCER, BASAL CELL: Primary | ICD-10-CM

## 2021-09-07 PROCEDURE — 11602 EXC TR-EXT MAL+MARG 1.1-2 CM: CPT | Performed by: FAMILY MEDICINE

## 2021-09-07 NOTE — PATIENT INSTRUCTIONS
Incision/Laceration repair    -Clean surgical area with antibacterial soap and water once daily. --You may be instructed to soak the wound with Hydrogen Peroxide to loosen scabbing around sutures, this is not to be done more often that every 3 days, should be for 30 seconds-1 min and then rinsed off with water.     -Keep surgical site moist with vaseline or antibiotic ointment (single, not tripleantibiotic or Neosporin) and apply a fresh bandage daily until a solid scab forms or if the wound is at risk for trauma or dirt.     -Follow up immediately if any growing redness (minimal redness or pale pink is normal along wound edges) surrounds the surgical site or if dripping drainage occurs at surgical site. Once a solid scab forms no more bandage needed. A wet scab can look yellow. This is not infection, just moisture.  -Once the lesion is healed be sure to apply sunscreen to the area to prevent burn of the newer and more delicate skin during the first 6 months of healing.    -If the scar begins to be raised you may massage the area firmly twice a day to help break down scar tissue and help the area become a flat scar. There is some evidence that Mederma applied to a scar daily for the first few months can help shrink and fade it more quickly then without intervention. Incision/Laceration repair    -Clean surgical area with antibacterial soap and water once daily.       --You may be instructed to soak the wound with Hydrogen Peroxide to loosen scabbing around sutures, this is not to be done more often that every 3 days, should be for 30 seconds-1 min and then rinsed off with water.     -Keep surgical site moist with vaseline or antibiotic ointment (single, not tripleantibiotic or Neosporin) and apply a fresh bandage daily until a solid scab forms or if the wound is at risk for trauma or dirt.     -Follow up immediately if any growing redness (minimal redness or pale pink is normal along wound edges) surrounds the surgical site or if dripping drainage occurs at surgical site. Once a solid scab forms no more bandage needed. A wet scab can look yellow. This is not infection, just moisture.  -Once the lesion is healed be sure to apply sunscreen to the area to prevent burn of the newer and more delicate skin during the first 6 months of healing.    -If the scar begins to be raised you may massage the area firmly twice a day to help break down scar tissue and help the area become a flat scar. There is some evidence that Mederma applied to a scar daily for the first few months can help shrink and fade it more quickly then without intervention.

## 2021-09-07 NOTE — PROGRESS NOTES
Diagnosis Orders   1. Skin cancer, basal cell  23819 - [place] EXC SKIN MALIG 1.1-2CM TRUNK,ARM,LEG    Specimen to Pathology Outpatient     Return for 10 to 14 day OV suture removal 15-minute. Patient biopsy left posterior buttock positive for basal cell cancer. Deep margin and lateral edge positive. Patient elected to have revision done here in the office      Current Outpatient Medications on File Prior to Visit   Medication Sig Dispense Refill    celecoxib (CELEBREX) 200 MG capsule Take 200 mg by mouth 2 times daily       No current facility-administered medications on file prior to visit. Patient has no known allergies. Review of Systems   Skin: Positive for wound. Temp 96.9 °F (36.1 °C)   Ht 5' 7\" (1.702 m)   Wt 132 lb (59.9 kg)   BMI 20.67 kg/m²   Physical Exam  Constitutional:       General: She is not in acute distress. Appearance: Normal appearance. She is well-developed. She is not toxic-appearing. HENT:      Head: Normocephalic and atraumatic. Right Ear: Hearing and tympanic membrane normal.      Left Ear: Hearing and tympanic membrane normal.      Nose: Nose normal. No nasal deformity. Eyes:      General: Lids are normal.         Right eye: No discharge. Left eye: No discharge. Conjunctiva/sclera: Conjunctivae normal.      Pupils: Pupils are equal, round, and reactive to light. Neck:      Thyroid: No thyroid mass or thyromegaly. Vascular: No JVD. Trachea: Trachea and phonation normal.   Cardiovascular:      Rate and Rhythm: Normal rate and regular rhythm. Pulmonary:      Effort: No accessory muscle usage or respiratory distress. Musculoskeletal:      Cervical back: Full passive range of motion without pain. Comments: FROM all large muscle groups and joints as witnessed when walking to exam table, getting on, and getting off the exam table. Skin:     General: Skin is warm and dry. Findings: No rash.       Comments: Granulation tissue and eschar present on left buttock wound 1 cm x 8 mm measurement. Neurological:      Mental Status: She is alert. Motor: No tremor or atrophy. Gait: Gait normal.   Psychiatric:         Speech: Speech normal.         Behavior: Behavior normal.         Thought Content: Thought content normal.         CONSENT:  The procedure was discussed with the patient. All questions were answered and alternative options discussed. The patient is aware of the risks of bleeding, infection,unsatisfactory scar result. Informed consent paperwork was signed by the patient. PROCEDURE: Left buttock wound positive margins basal cell cancer  The lesion area was prepped with alcohol wipes and 3 cc of 1% Lidocaine with epi used for anesthesia. A 15 blade scalpel was used to completely remove the visible lesion. The lesion size is noted in physical exam and the excision made to allow 2 mm non-lesion skin border. Hemostasis was achieved with 3.0 Ethilon sutures interrupted x 8  EBL < 1cc. Bacitracin and a bandage were placed over the wound. ASSESSMENT AND PLAN:   Diagnosis Orders   1. Skin cancer, basal cell  72950 - [place] EXC SKIN MALIG 1.1-2CM TRUNK,ARM,LEG    Specimen to Pathology Outpatient       Return for 10 to 14 day OV suture removal 15-minute.       DO NOT USE TRIPLE ANTIBIOTIC OINTMENT    Orders Placed This Encounter   Procedures    Specimen to Pathology Outpatient     Margins requested on all specimens  R/O negative margins left buttock  Location    Original biopsy August 17, 2021     Standing Status:   Future     Standing Expiration Date:   9/7/2022     Order Specific Question:   PREVIOUS BIOPSY     Answer:   No     Order Specific Question:   PREOP DIAGNOSIS     Answer:   Basal cell cancer buttock left positive deep and lateral margin     Order Specific Question:   FROZEN SECTION - NO OR YES/SPECIMEN     Answer:   No    03789 - AL EXC SKIN MALIG 1.1-2CM TRUNK,ARM,LEG     Left buttock basal cell cancer positive deep and lateral margin     No orders of the defined types were placed in this encounter. Patient Instructions   Incision/Laceration repair    -Clean surgical area with antibacterial soap and water once daily. --You may be instructed to soak the wound with Hydrogen Peroxide to loosen scabbing around sutures, this is not to be done more often that every 3 days, should be for 30 seconds-1 min and then rinsed off with water.     -Keep surgical site moist with vaseline or antibiotic ointment (single, not tripleantibiotic or Neosporin) and apply a fresh bandage daily until a solid scab forms or if the wound is at risk for trauma or dirt.     -Follow up immediately if any growing redness (minimal redness or pale pink is normal along wound edges) surrounds the surgical site or if dripping drainage occurs at surgical site. Once a solid scab forms no more bandage needed. A wet scab can look yellow. This is not infection, just moisture.  -Once the lesion is healed be sure to apply sunscreen to the area to prevent burn of the newer and more delicate skin during the first 6 months of healing.    -If the scar begins to be raised you may massage the area firmly twice a day to help break down scar tissue and help the area become a flat scar. There is some evidence that Mederma applied to a scar daily for the first few months can help shrink and fade it more quickly then without intervention. Incision/Laceration repair    -Clean surgical area with antibacterial soap and water once daily.       --You may be instructed to soak the wound with Hydrogen Peroxide to loosen scabbing around sutures, this is not to be done more often that every 3 days, should be for 30 seconds-1 min and then rinsed off with water.     -Keep surgical site moist with vaseline or antibiotic ointment (single, not tripleantibiotic or Neosporin) and apply a fresh bandage daily until a solid scab forms or if the wound is at risk for trauma or dirt.     -Follow up immediately if any growing redness (minimal redness or pale pink is normal along wound edges) surrounds the surgical site or if dripping drainage occurs at surgical site. Once a solid scab forms no more bandage needed. A wet scab can look yellow. This is not infection, just moisture.  -Once the lesion is healed be sure to apply sunscreen to the area to prevent burn of the newer and more delicate skin during the first 6 months of healing.    -If the scar begins to be raised you may massage the area firmly twice a day to help break down scar tissue and help the area become a flat scar. There is some evidence that Mederma applied to a scar daily for the first few months can help shrink and fade it more quickly then without intervention.

## 2021-09-07 NOTE — PROGRESS NOTES
Skin:     General: Skin is warm. Capillary Refill: Capillary refill takes less than 2 seconds. Findings: No rash. Comments: Left lower buttocks: Dark brown to black irregularly appearing macule         Prior to the start of the procedure known as BX an informed consent has been signed and scanned into patients EMR. After the patient is draped and prepped and before the start of the procedure  Dr. Christiano Ng and attending staff Sravani Short both must perform a verbal confirmation using patients Name   and  The patient should participate in this. Dr. Christiano Ng will aranza the surgical site if needed for documenting superior and inferior aspects. \"correct site. \" will be verified on container, and order. These above steps will be documented into the patients EMR chart.

## 2021-09-08 ENCOUNTER — ANESTHESIA EVENT (OUTPATIENT)
Dept: ENDOSCOPY | Age: 61
End: 2021-09-08
Payer: COMMERCIAL

## 2021-09-08 ENCOUNTER — ANCILLARY PROCEDURE (OUTPATIENT)
Dept: ENDOSCOPY | Age: 61
End: 2021-09-08
Attending: INTERNAL MEDICINE
Payer: COMMERCIAL

## 2021-09-08 ENCOUNTER — ANESTHESIA (OUTPATIENT)
Dept: ENDOSCOPY | Age: 61
End: 2021-09-08
Payer: COMMERCIAL

## 2021-09-08 ENCOUNTER — HOSPITAL ENCOUNTER (OUTPATIENT)
Age: 61
Setting detail: OUTPATIENT SURGERY
Discharge: HOME | End: 2021-09-08
Attending: INTERNAL MEDICINE | Admitting: INTERNAL MEDICINE
Payer: COMMERCIAL

## 2021-09-08 VITALS
DIASTOLIC BLOOD PRESSURE: 53 MMHG | OXYGEN SATURATION: 99 % | RESPIRATION RATE: 19 BRPM | TEMPERATURE: 97.7 F | SYSTOLIC BLOOD PRESSURE: 89 MMHG

## 2021-09-08 VITALS
HEART RATE: 73 BPM | RESPIRATION RATE: 20 BRPM | HEIGHT: 67 IN | SYSTOLIC BLOOD PRESSURE: 103 MMHG | OXYGEN SATURATION: 99 % | DIASTOLIC BLOOD PRESSURE: 62 MMHG | TEMPERATURE: 97.5 F | BODY MASS INDEX: 19.93 KG/M2 | WEIGHT: 127 LBS

## 2021-09-08 PROCEDURE — 2580000003 HC RX 258

## 2021-09-08 PROCEDURE — 2500000003 HC RX 250 WO HCPCS: Performed by: NURSE ANESTHETIST, CERTIFIED REGISTERED

## 2021-09-08 PROCEDURE — 7100000011 HC PHASE II RECOVERY - ADDTL 15 MIN: Performed by: INTERNAL MEDICINE

## 2021-09-08 PROCEDURE — 2580000003 HC RX 258: Performed by: INTERNAL MEDICINE

## 2021-09-08 PROCEDURE — 7100000010 HC PHASE II RECOVERY - FIRST 15 MIN: Performed by: INTERNAL MEDICINE

## 2021-09-08 PROCEDURE — 3609027000 HC COLONOSCOPY: Performed by: INTERNAL MEDICINE

## 2021-09-08 PROCEDURE — 88305 TISSUE EXAM BY PATHOLOGIST: CPT

## 2021-09-08 PROCEDURE — 45380 COLONOSCOPY AND BIOPSY: CPT | Performed by: INTERNAL MEDICINE

## 2021-09-08 PROCEDURE — 3700000000 HC ANESTHESIA ATTENDED CARE: Performed by: INTERNAL MEDICINE

## 2021-09-08 PROCEDURE — 45385 COLONOSCOPY W/LESION REMOVAL: CPT | Performed by: INTERNAL MEDICINE

## 2021-09-08 PROCEDURE — 6360000002 HC RX W HCPCS: Performed by: NURSE ANESTHETIST, CERTIFIED REGISTERED

## 2021-09-08 PROCEDURE — 3700000001 HC ADD 15 MINUTES (ANESTHESIA): Performed by: INTERNAL MEDICINE

## 2021-09-08 PROCEDURE — 6370000000 HC RX 637 (ALT 250 FOR IP): Performed by: INTERNAL MEDICINE

## 2021-09-08 PROCEDURE — 2709999900 HC NON-CHARGEABLE SUPPLY: Performed by: INTERNAL MEDICINE

## 2021-09-08 RX ORDER — LIDOCAINE HYDROCHLORIDE 20 MG/ML
INJECTION, SOLUTION INFILTRATION; PERINEURAL PRN
Status: DISCONTINUED | OUTPATIENT
Start: 2021-09-08 | End: 2021-09-08 | Stop reason: SDUPTHER

## 2021-09-08 RX ORDER — IBUPROFEN 200 MG
200 TABLET ORAL ONCE
COMMUNITY
End: 2022-09-22 | Stop reason: ALTCHOICE

## 2021-09-08 RX ORDER — MAGNESIUM HYDROXIDE 1200 MG/15ML
LIQUID ORAL PRN
Status: DISCONTINUED | OUTPATIENT
Start: 2021-09-08 | End: 2021-09-08 | Stop reason: ALTCHOICE

## 2021-09-08 RX ORDER — HYDROXYZINE HYDROCHLORIDE 25 MG/1
25 TABLET, FILM COATED ORAL PRN
COMMUNITY
End: 2022-09-22 | Stop reason: ALTCHOICE

## 2021-09-08 RX ORDER — PROPOFOL 10 MG/ML
INJECTION, EMULSION INTRAVENOUS PRN
Status: DISCONTINUED | OUTPATIENT
Start: 2021-09-08 | End: 2021-09-08 | Stop reason: SDUPTHER

## 2021-09-08 RX ORDER — SODIUM CHLORIDE 9 MG/ML
INJECTION, SOLUTION INTRAVENOUS
Status: COMPLETED
Start: 2021-09-08 | End: 2021-09-08

## 2021-09-08 RX ADMIN — PROPOFOL 100 MG: 10 INJECTION, EMULSION INTRAVENOUS at 07:57

## 2021-09-08 RX ADMIN — PROPOFOL 100 MG: 10 INJECTION, EMULSION INTRAVENOUS at 08:04

## 2021-09-08 RX ADMIN — PROPOFOL 150 MG: 10 INJECTION, EMULSION INTRAVENOUS at 07:50

## 2021-09-08 RX ADMIN — LIDOCAINE HYDROCHLORIDE 50 MG: 20 INJECTION, SOLUTION INFILTRATION; PERINEURAL at 07:50

## 2021-09-08 RX ADMIN — PROPOFOL 50 MG: 10 INJECTION, EMULSION INTRAVENOUS at 08:08

## 2021-09-08 RX ADMIN — SODIUM CHLORIDE 500 ML: 9 INJECTION, SOLUTION INTRAVENOUS at 07:00

## 2021-09-08 ASSESSMENT — PULMONARY FUNCTION TESTS
PIF_VALUE: 1

## 2021-09-08 ASSESSMENT — LIFESTYLE VARIABLES: SMOKING_STATUS: 1

## 2021-09-08 ASSESSMENT — PAIN - FUNCTIONAL ASSESSMENT: PAIN_FUNCTIONAL_ASSESSMENT: 0-10

## 2021-09-08 NOTE — ANESTHESIA PRE PROCEDURE
Department of Anesthesiology  Preprocedure Note       Name:  Petrona Duke   Age:  64 y.o.  :  1960                                          MRN:  [id number]         Date:  2021      Surgeon: Marianne Arnold):  Xiang Sage MD    Procedure: Procedure(s):  COLORECTAL CANCER SCREENING, NOT HIGH RISK    Medications prior to admission:   Prior to Admission medications    Medication Sig Start Date End Date Taking? Authorizing Provider   hydrOXYzine (ATARAX) 25 MG tablet Take 25 mg by mouth as needed   Yes Historical Provider, MD   ibuprofen (ADVIL) 200 MG tablet Take 200 mg by mouth once   Yes Historical Provider, MD   celecoxib (CELEBREX) 200 MG capsule Take 200 mg by mouth 2 times daily 20   Historical Provider, MD       Current medications:    No current facility-administered medications for this encounter. Allergies:  No Known Allergies    Problem List:    Patient Active Problem List   Diagnosis Code    Chronic low back pain M54.5, G89.29    Dysuria R30.0       Past Medical History:        Diagnosis Date    Cancer ([facility] [address]     Chronic back pain        Past Surgical History:        Procedure Laterality Date     SECTION      HYSTERECTOMY, TOTAL ABDOMINAL      LAPAROSCOPY      OVARY REMOVAL      SKIN CANCER EXCISION      basal cell skin cancer excised per patient       Social History:    Social History     Tobacco Use    Smoking status: Current Every Day Smoker     Packs/day: 1.00     Years: 30.00     Pack years: 30.00    Smokeless tobacco: Never Used   Substance Use Topics    Alcohol use:  Yes                                Ready to quit: Not Answered  Counseling given: Not Answered      Vital Signs (Current):   Vitals:    21 0655   BP: 129/74   Pulse: 67   Resp: 16   Temp: 36.4 °C (97.5 °F)   TempSrc: Temporal   SpO2: 98%   Weight: 127 lb (57.6 kg)   Height: 5' 7\" (1.702 m)                                              BP Readings from Last 3 Encounters:   21 129/74 Neuro/Psych:                ROS comment: Chronic back pain GI/Hepatic/Renal: Neg GI/Hepatic/Renal ROS            Endo/Other: Negative Endo/Other ROS                    Abdominal:       Abdomen: soft. Vascular: negative vascular ROS. Other Findings:             Anesthesia Plan      MAC     ASA 2       Induction: intravenous. Anesthetic plan and risks discussed with patient. Plan discussed with attending.                   GIO Torrez - CRNA   9/8/2021

## 2021-09-08 NOTE — ANESTHESIA POSTPROCEDURE EVALUATION
Department of Anesthesiology  Postprocedure Note    Patient: Supa Jerez  MRN: [id number]  YOB: 1960  Date of evaluation: 9/8/2021  Time:  8:11 AM     Procedure Summary     Date: 09/08/21 Room / Location: [address] [place] [address]    Anesthesia Start: 4748 Anesthesia Stop:     Procedure: [address] with polypectomies (N/A ) Diagnosis: (screening Z12.11)    Surgeons: Magy Vazquez MD Responsible Provider: Darylene Distad, APRN - CRNA    Anesthesia Type: MAC ASA Status: 2          Anesthesia Type: No value filed. Tino Phase I:      Tino Phase II:      Last vitals: Reviewed and per EMR flowsheets.        Anesthesia Post Evaluation    Patient location during evaluation: PACU  Patient participation: complete - patient participated  Level of consciousness: awake  Pain score: 0  Airway patency: patent  Nausea & Vomiting: no nausea  Complications: no  Cardiovascular status: hemodynamically stable  Respiratory status: acceptable  Hydration status: stable

## 2021-09-08 NOTE — H&P
Patient Name: Conrad Boas  : 1960  MRN: [id number]  DATE: 21      ENDOSCOPY  History and Physical    Procedure:    [] Diagnostic Colonoscopy       [x] Screening Colonoscopy  [] EGD      [] ERCP      [] EUS       [] Other    [x] Previous office notes/History and Physical reviewed from the patients chart. Please see EMR for further details of HPI. I have examined the patient's status immediately prior to the procedure and:      Indications/HPI:    []Abdominal Pain   []Cancer- GI/Lung  []Fhx of colon CA/polyps  []History of Polyps   []Swifts   []Melena  []Abnormal Imaging   []Dysphagia    []Persistent Pneumonia  []Anemia   []Food Impaction  []History of Polyps  []GI Bleed   []Pulmonary nodule/Mass  []Change in bowel habits  []Heartburn/Reflux  []Rectal Bleed (BRBPR)  []Chest Pain - Non Cardiac  []Heme (+) Stool  []Ulcers  []Constipation   []Hemoptysis   []Varices  []Diarrhea   []Hypoxemia  []Nausea/Vomiting   [x]Screening   []Crohns/Colitis  []Other:    Anesthesia:   [x] MAC [] Moderate Sedation   [] General   [] None     ROS: 12 pt Review of Symptoms was negative unless mentioned above    Medications:   Prior to Admission medications    Medication Sig Start Date End Date Taking?  Authorizing Provider   hydrOXYzine (ATARAX) 25 MG tablet Take 25 mg by mouth as needed   Yes Historical Provider, MD   ibuprofen (ADVIL) 200 MG tablet Take 200 mg by mouth once   Yes Historical Provider, MD   celecoxib (CELEBREX) 200 MG capsule Take 200 mg by mouth 2 times daily 20   Historical Provider, MD       Allergies: No Known Allergies     History of allergic reaction to anesthesia:  No    Past Medical History:  Past Medical History:   Diagnosis Date    Cancer ([facility] [address]     Chronic back pain        Past Surgical History:  Past Surgical History:   Procedure Laterality Date     SECTION      HYSTERECTOMY, TOTAL ABDOMINAL      LAPAROSCOPY      OVARY REMOVAL      SKIN CANCER EXCISION      basal cell skin cancer excised per patient       Social History:  Social History     Tobacco Use    Smoking status: Current Every Day Smoker     Packs/day: 1.00     Years: 30.00     Pack years: 30.00    Smokeless tobacco: Never Used   Vaping Use    Vaping Use: Never used   Substance Use Topics    Alcohol use: Yes    Drug use: Never       Vital Signs:   Vitals:    09/08/21 0655   BP: 129/74   Pulse: 67   Resp: 16   Temp: 97.5 °F (36.4 °C)   SpO2: 98%        Physical Exam:  Cardiac:  [x]WNL  []Comments:  Pulmonary:  [x]WNL   []Comments:   Neuro/Mental Status:  [x]WNL  []Comments:  Abdominal:  [x]WNL    []Comments:  Other:   []WNL  []Comments:    Informed Consent:  The risks and benefits of the procedure have been discussed with either the patient or if they cannot consent, their representative. Assessment:  Patient examined and appropriate for planned sedation and procedure. Plan:  Proceed with planned sedation and procedure as above.     Yonny Rose MD  7:46 AM

## 2021-09-09 PROBLEM — C44.91 CANCER OF THE SKIN, BASAL CELL: Status: ACTIVE | Noted: 2021-09-09

## 2021-09-10 ENCOUNTER — HOSPITAL ENCOUNTER (OUTPATIENT)
Dept: WOMENS IMAGING | Age: 61
Discharge: HOME | End: 2021-09-12
Payer: COMMERCIAL

## 2021-09-10 DIAGNOSIS — Z12.31 BREAST CANCER SCREENING BY MAMMOGRAM: ICD-10-CM

## 2021-09-17 ENCOUNTER — NURSE ONLY (OUTPATIENT)
Dept: FAMILY MEDICINE CLINIC | Age: 61
End: 2021-09-17

## 2021-09-17 DIAGNOSIS — Z51.89 VISIT FOR WOUND CHECK: Primary | ICD-10-CM

## 2021-09-17 NOTE — PROGRESS NOTES
Pt had called and we had her stop in. Kyler Villalpando took at look at her and chose to have me clean area and add steri strips until pt is in for appt next week. Does not show any infection. Extra strips and bacitracin given to pt. If anything else is needed before appt pt is not stop in or call.

## 2021-09-18 NOTE — RESULT ENCOUNTER NOTE
9/18/2021  [place] [address]    Dear Joselito Wells,    Your colonoscopy reveled a growth on the large intestine (Colon) called a polyp. A polyp could be a \"precancerous\" growth, which means that with time it could turn into cancer. A polyp was removed during your procedure. As instructed after your colonoscopy, recommendations suggest a follow up colonoscopy in 5 years. We hope you are doing well, and if you have any questions please contact me at [phone]. Thank you for choosing [facility] for your healthcare.     Sincerely,     Joni Vasquez MD

## 2021-09-22 ENCOUNTER — OFFICE VISIT (OUTPATIENT)
Dept: FAMILY MEDICINE CLINIC | Age: 61
End: 2021-09-22

## 2021-09-22 VITALS
WEIGHT: 133.4 LBS | OXYGEN SATURATION: 98 % | BODY MASS INDEX: 20.94 KG/M2 | HEART RATE: 74 BPM | TEMPERATURE: 97.4 F | HEIGHT: 67 IN

## 2021-09-22 DIAGNOSIS — C44.91 SKIN CANCER, BASAL CELL: Primary | ICD-10-CM

## 2021-09-22 PROCEDURE — 99024 POSTOP FOLLOW-UP VISIT: CPT | Performed by: FAMILY MEDICINE

## 2021-09-22 NOTE — PROGRESS NOTES
Diagnosis Orders   1. Skin cancer, basal cell       Return in about 6 months (around 3/22/2022) for 15 min skin check. Patient Instructions   Wound will finish healing by secondary intention      Subjective:      Patient ID: Remedios Machuca is a 64 y.o. female who presents for:  Chief Complaint   Patient presents with    Suture / Staple Removal     pt in on the 7th for procedure, returned on the 10th and we applied steri strips per [facility] NP. 2 stitches remain        Patient denies redness or drainage from the wound. She does a very physical job and popped a few of her sutures. She came in and since the urgent care Steri-Stripped closed. Its gone well since then she has a nurse sister who helped her and she is here for the removal of  sutures      Current Outpatient Medications on File Prior to Visit   Medication Sig Dispense Refill    hydrOXYzine (ATARAX) 25 MG tablet Take 25 mg by mouth as needed      ibuprofen (ADVIL) 200 MG tablet Take 200 mg by mouth once      celecoxib (CELEBREX) 200 MG capsule Take 200 mg by mouth 2 times daily       No current facility-administered medications on file prior to visit. Allergies:  Patient has no known allergies. Objective:   Pulse 74   Temp 97.4 °F (36.3 °C)   Ht 5' 7\" (1.702 m)   Wt 133 lb 6.4 oz (60.5 kg)   SpO2 98%   Breastfeeding No   BMI 20.89 kg/m²     Physical Exam  Constitutional:       General: She is not in acute distress. Appearance: Normal appearance. She is well-developed. She is not toxic-appearing. HENT:      Head: Normocephalic and atraumatic. Right Ear: Hearing and tympanic membrane normal.      Left Ear: Hearing and tympanic membrane normal.      Nose: Nose normal. No nasal deformity. Eyes:      General: Lids are normal.         Right eye: No discharge. Left eye: No discharge. Conjunctiva/sclera: Conjunctivae normal.      Pupils: Pupils are equal, round, and reactive to light.    Neck:      Thyroid: No thyroid mass or thyromegaly. Vascular: No JVD. Trachea: Trachea and phonation normal.   Cardiovascular:      Rate and Rhythm: Normal rate and regular rhythm. Pulmonary:      Effort: No accessory muscle usage or respiratory distress. Musculoskeletal:      Cervical back: Full passive range of motion without pain. Comments: FROM all large muscle groups and joints as witnessed when walking to exam table, getting on, and getting off the exam table. Skin:     General: Skin is warm and dry. Findings: No rash. Comments: Left buttock 2 sutures remain intact removed without dehiscence. The center of the wound that previously dehisced now has healthy granulation tissue present   Neurological:      Mental Status: She is alert. Motor: No tremor or atrophy. Gait: Gait normal.   Psychiatric:         Speech: Speech normal.         Behavior: Behavior normal.         Thought Content: Thought content normal.           Assessment:       Diagnosis Orders   1. Skin cancer, basal cell           No orders of the defined types were placed in this encounter. No orders of the defined types were placed in this encounter. Medication List          Accurate as of September 22, 2021  3:55 PM. If you have any questions, ask your nurse or doctor. CONTINUE taking these medications    Advil 200 MG tablet  Generic drug: ibuprofen     celecoxib 200 MG capsule  Commonly known as: CELEBREX     hydrOXYzine 25 MG tablet  Commonly known as: ATARAX              Plan:   Return in about 6 months (around 3/22/2022) for 15 min skin check. Patient Instructions   Wound will finish healing by secondary intention      This note was partially created with the assistance of dictation. This may lead to grammatical or spelling errors. Dioni Baldwin M.D.

## 2021-11-08 ENCOUNTER — OFFICE VISIT (OUTPATIENT)
Dept: FAMILY MEDICINE CLINIC | Age: 61
End: 2021-11-08
Payer: COMMERCIAL

## 2021-11-08 VITALS
HEART RATE: 81 BPM | SYSTOLIC BLOOD PRESSURE: 120 MMHG | BODY MASS INDEX: 21.03 KG/M2 | WEIGHT: 134 LBS | DIASTOLIC BLOOD PRESSURE: 84 MMHG | HEIGHT: 67 IN | OXYGEN SATURATION: 97 % | TEMPERATURE: 98.2 F

## 2021-11-08 DIAGNOSIS — S09.93XA FACIAL INJURY, INITIAL ENCOUNTER: Primary | ICD-10-CM

## 2021-11-08 PROCEDURE — 99214 OFFICE O/P EST MOD 30 MIN: CPT

## 2021-11-08 ASSESSMENT — ENCOUNTER SYMPTOMS
SINUS PRESSURE: 0
FACIAL SWELLING: 1
EYE PAIN: 0
SORE THROAT: 0
SINUS PAIN: 0
DIARRHEA: 0
CHEST TIGHTNESS: 0
TROUBLE SWALLOWING: 0
COUGH: 0
NAUSEA: 0
EYE ITCHING: 0
WHEEZING: 0
COLOR CHANGE: 1
VOMITING: 0
APNEA: 0
ABDOMINAL PAIN: 0
SHORTNESS OF BREATH: 0
EYE DISCHARGE: 0
RHINORRHEA: 0
BACK PAIN: 0

## 2021-11-08 NOTE — PATIENT INSTRUCTIONS
Continue to use Tylenol and Aleve for pain. If you experience symptoms of dizziness lightheadedness nausea vomiting or worsening neck pain then go to the ER for further evaluation.  I will call you with results of your x-ray
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
## 2021-11-16 ENCOUNTER — OFFICE VISIT (OUTPATIENT)
Dept: FAMILY MEDICINE CLINIC | Age: 61
End: 2021-11-16
Payer: COMMERCIAL

## 2021-11-16 VITALS
HEIGHT: 67 IN | TEMPERATURE: 97.9 F | DIASTOLIC BLOOD PRESSURE: 72 MMHG | OXYGEN SATURATION: 97 % | SYSTOLIC BLOOD PRESSURE: 122 MMHG | WEIGHT: 137 LBS | BODY MASS INDEX: 21.5 KG/M2 | HEART RATE: 77 BPM

## 2021-11-16 DIAGNOSIS — N30.00 ACUTE CYSTITIS WITHOUT HEMATURIA: Primary | ICD-10-CM

## 2021-11-16 DIAGNOSIS — N30.00 ACUTE CYSTITIS WITHOUT HEMATURIA: ICD-10-CM

## 2021-11-16 PROCEDURE — 81003 URINALYSIS AUTO W/O SCOPE: CPT | Performed by: NURSE PRACTITIONER

## 2021-11-16 PROCEDURE — 99213 OFFICE O/P EST LOW 20 MIN: CPT | Performed by: NURSE PRACTITIONER

## 2021-11-16 RX ORDER — PHENAZOPYRIDINE HYDROCHLORIDE 200 MG/1
200 TABLET, FILM COATED ORAL 3 TIMES DAILY PRN
Qty: 9 TABLET | Refills: 0 | Status: SHIPPED | OUTPATIENT
Start: 2021-11-16 | End: 2021-11-19

## 2021-11-16 RX ORDER — CEPHALEXIN 500 MG/1
500 CAPSULE ORAL 2 TIMES DAILY
Qty: 14 CAPSULE | Refills: 0 | Status: SHIPPED | OUTPATIENT
Start: 2021-11-16 | End: 2021-11-23

## 2021-11-16 ASSESSMENT — ENCOUNTER SYMPTOMS
TROUBLE SWALLOWING: 0
DIARRHEA: 0
ABDOMINAL DISTENTION: 0
BACK PAIN: 0
SINUS PAIN: 0
RHINORRHEA: 0
VOMITING: 0
COUGH: 0
SINUS PRESSURE: 0
CONSTIPATION: 0
WHEEZING: 0
COLOR CHANGE: 0
ABDOMINAL PAIN: 0
SORE THROAT: 0
NAUSEA: 0
SHORTNESS OF BREATH: 0
CHEST TIGHTNESS: 0

## 2021-11-16 NOTE — PROGRESS NOTES
Subjective  Vignesh Mcdowell, 64 y.o. female presents today with:  Chief Complaint   Patient presents with    Urinary Tract Infection     lower back pain wrapped around causing abdominal pain. frequency and urgency of urination. started on 11/14/21        Urinary Tract Infection   This is a new problem. The current episode started in the past 7 days (3 days increasing). The problem occurs intermittently. The problem has been gradually worsening. The quality of the pain is described as aching. The pain is at a severity of 9/10. There has been no fever. There is no history of pyelonephritis. Associated symptoms include flank pain, frequency and urgency. Pertinent negatives include no chills, discharge, hematuria, hesitancy, nausea, possible pregnancy, sweats or vomiting. She has tried increased fluids for the symptoms. The treatment provided no relief. There is no history of catheterization, kidney stones, recurrent UTIs, a single kidney, urinary stasis or a urological procedure. Review of Systems   Constitutional: Negative for activity change, appetite change, chills, diaphoresis, fatigue and fever. HENT: Negative for congestion, ear pain, postnasal drip, rhinorrhea, sinus pressure, sinus pain, sore throat and trouble swallowing. Eyes: Negative for visual disturbance. Respiratory: Negative for cough, chest tightness, shortness of breath and wheezing. Cardiovascular: Negative for chest pain and palpitations. Gastrointestinal: Negative for abdominal distention, abdominal pain, constipation, diarrhea, nausea and vomiting. Genitourinary: Positive for dysuria, flank pain, frequency and urgency. Negative for difficulty urinating, hematuria, hesitancy, menstrual problem, vaginal bleeding, vaginal discharge and vaginal pain. Musculoskeletal: Negative for arthralgias, back pain, myalgias, neck pain and neck stiffness. Skin: Negative for color change and rash.    Neurological: Negative for dizziness, tremors, seizures, syncope, speech difficulty, weakness, light-headedness, numbness and headaches. Past Medical History:   Diagnosis Date    Cancer ([address]     Chronic back pain      Past Surgical History:   Procedure Laterality Date     SECTION      COLONOSCOPY      COLONOSCOPY N/A 2021    COLORECTAL CANCER SCREENING with polypectomies performed by Ervin Torres MD at [place] [address], TOTAL ABDOMINAL      LAPAROSCOPY      OVARY REMOVAL      SKIN CANCER EXCISION      basal cell skin cancer excised per patient     Social History     Socioeconomic History    Marital status:      Spouse name: Not on file    Number of children: Not on file    Years of education: Not on file    Highest education level: Not on file   Occupational History    Not on file   Tobacco Use    Smoking status: Current Every Day Smoker     Packs/day: 1.00     Years: 30.00     Pack years: 30.00    Smokeless tobacco: Never Used   Vaping Use    Vaping Use: Never used   Substance and Sexual Activity    Alcohol use: Yes    Drug use: Never    Sexual activity: Not on file   Other Topics Concern    Not on file   Social History Narrative    Not on file     Social Determinants of Health     Financial Resource Strain: Low Risk     Difficulty of Paying Living Expenses: Not hard at all   Food Insecurity: No Food Insecurity    Worried About Running Out of Food in the Last Year: Never true    [address] in the Last Year: Never true   Transportation Needs: No Transportation Needs    Lack of Transportation (Medical): No    Lack of Transportation (Non-Medical):  No   Physical Activity:     Days of Exercise per Week: Not on file    Minutes of Exercise per Session: Not on file   Stress:     Feeling of Stress : Not on file   Social Connections:     Frequency of Communication with Friends and Family: Not on file    Frequency of Social Gatherings with Friends and Family: Not on file   Mundo Danielle Attends Jainism Services: Not on file    Active Member of Clubs or Organizations: Not on file    Attends Club or Organization Meetings: Not on file    Marital Status: Not on file   Intimate Partner Violence:     Fear of Current or Ex-Partner: Not on file    Emotionally Abused: Not on file    Physically Abused: Not on file    Sexually Abused: Not on file   Housing Stability:     Unable to Pay for Housing in the Last Year: Not on file    Number of [place] in the Last Year: Not on file    Unstable Housing in the Last Year: Not on file     Family History   Problem Relation Age of Onset    Heart Attack Father     Stroke Father     Prostate Cancer Brother     Cancer Maternal Grandmother     Stroke Maternal Grandmother     Cancer Maternal Grandfather     Diabetes Maternal Grandfather     Kidney Disease Neg Hx      No Known Allergies  Current Outpatient Medications   Medication Sig Dispense Refill    cephALEXin (KEFLEX) 500 MG capsule Take 1 capsule by mouth 2 times daily for 7 days 14 capsule 0    phenazopyridine (PYRIDIUM) 200 MG tablet Take 1 tablet by mouth 3 times daily as needed for Pain 9 tablet 0    ibuprofen (ADVIL) 200 MG tablet Take 200 mg by mouth once      celecoxib (CELEBREX) 200 MG capsule Take 200 mg by mouth 2 times daily      hydrOXYzine (ATARAX) 25 MG tablet Take 25 mg by mouth as needed (Patient not taking: Reported on 11/16/2021)       No current facility-administered medications for this visit. PMH, Surgical Hx, Family Hx, and Social Hx reviewed and updated. Health Maintenance reviewed. Objective    Vitals:    11/16/21 1531   BP: 122/72   Site: Right Upper Arm   Position: Sitting   Cuff Size: Medium Adult   Pulse: 77   Temp: 97.9 °F (36.6 °C)   TempSrc: Temporal   SpO2: 97%   Weight: 137 lb (62.1 kg)   Height: 5' 7\" (1.702 m)       Physical Exam  Constitutional:       General: She is not in acute distress. Appearance: Normal appearance. She is normal weight. She is not ill-appearing, toxic-appearing or diaphoretic. HENT:      Head: Normocephalic and atraumatic. Right Ear: External ear normal.      Left Ear: External ear normal.      Nose: Nose normal. No congestion or rhinorrhea. Mouth/Throat:      Mouth: Mucous membranes are moist.      Pharynx: Oropharynx is clear. No oropharyngeal exudate or posterior oropharyngeal erythema. Eyes:      General:         Right eye: No discharge. Left eye: No discharge. Conjunctiva/sclera: Conjunctivae normal.      Pupils: Pupils are equal, round, and reactive to light. Cardiovascular:      Rate and Rhythm: Normal rate and regular rhythm. Pulses: Normal pulses. Pulmonary:      Effort: Pulmonary effort is normal.      Breath sounds: Normal breath sounds. Abdominal:      General: Bowel sounds are normal. There is no distension. Palpations: Abdomen is soft. Tenderness: There is no abdominal tenderness. Musculoskeletal:         General: No tenderness or signs of injury. Normal range of motion. Cervical back: Normal range of motion and neck supple. No rigidity or tenderness. Skin:     General: Skin is warm and dry. Capillary Refill: Capillary refill takes less than 2 seconds. Neurological:      General: No focal deficit present. Mental Status: She is alert and oriented to person, place, and time. Mental status is at baseline. Cranial Nerves: No cranial nerve deficit. Sensory: No sensory deficit. Motor: No weakness. Coordination: Coordination normal.         Assessment & Plan    Diagnosis Orders   1.  Acute cystitis without hematuria  POCT Urinalysis No Micro (Auto)    Culture, Urine    cephALEXin (KEFLEX) 500 MG capsule    phenazopyridine (PYRIDIUM) 200 MG tablet     Orders Placed This Encounter   Procedures    Culture, Urine     Standing Status:   Future     Standing Expiration Date:   11/16/2022     Order Specific Question:   Specify (ex-cath, midstream, cysto, etc)? Answer:   midstream    POCT Urinalysis No Micro (Auto)     Orders Placed This Encounter   Medications    cephALEXin (KEFLEX) 500 MG capsule     Sig: Take 1 capsule by mouth 2 times daily for 7 days     Dispense:  14 capsule     Refill:  0    phenazopyridine (PYRIDIUM) 200 MG tablet     Sig: Take 1 tablet by mouth 3 times daily as needed for Pain     Dispense:  9 tablet     Refill:  0     There are no discontinued medications. Return in about 1 week (around 11/23/2021), or if symptoms worsen or fail to improve, for follow up with PCP. Reviewed with the patient: current clinical status,medications, activities and diet. Side effects, adverse effects of themedication prescribed today, as well as treatment plan/ rationale and result expectations have been discussed with the patient who expresses understanding and desires to proceed. Close follow up to evaluate treatment results and for coordination of care. I have reviewed the patient's medical history in detail and updated the computerized patient record.      Anyi Chi, GIO - CNP

## 2021-11-16 NOTE — PATIENT INSTRUCTIONS
Patient Education        Urinary Tract Infection (UTI) in Women: Care Instructions  Overview     A urinary tract infection, or UTI, is a general term for an infection anywhere between the kidneys and the urethra (where urine comes out). Most UTIs are bladder infections. They often cause pain or burning when you urinate. UTIs are caused by bacteria and can be cured with antibiotics. Be sure to complete your treatment so that the infection does not get worse. Follow-up care is a key part of your treatment and safety. Be sure to make and go to all appointments, and call your doctor if you are having problems. It's also a good idea to know your test results and keep a list of the medicines you take. How can you care for yourself at home? · Take your antibiotics as directed. Do not stop taking them just because you feel better. You need to take the full course of antibiotics. · Drink extra water and other fluids for the next day or two. This will help make the urine less concentrated and help wash out the bacteria that are causing the infection. (If you have kidney, heart, or liver disease and have to limit fluids, talk with your doctor before you increase the amount of fluids you drink.)  · Avoid drinks that are carbonated or have caffeine. They can irritate the bladder. · Urinate often. Try to empty your bladder each time. · To relieve pain, take a hot bath or lay a heating pad set on low over your lower belly or genital area. Never go to sleep with a heating pad in place. To prevent UTIs  · Drink plenty of water each day. This helps you urinate often, which clears bacteria from your system. (If you have kidney, heart, or liver disease and have to limit fluids, talk with your doctor before you increase the amount of fluids you drink.)  · Urinate when you need to. · If you are sexually active, urinate right after you have sex. · Change sanitary pads often.   · Avoid douches, bubble baths, feminine hygiene sprays, and other feminine hygiene products that have deodorants. · After going to the bathroom, wipe from front to back. When should you call for help? Call your doctor now or seek immediate medical care if:    · Symptoms such as fever, chills, nausea, or vomiting get worse or appear for the first time.     · You have new pain in your back just below your rib cage. This is called flank pain.     · There is new blood or pus in your urine.     · You have any problems with your antibiotic medicine. Watch closely for changes in your health, and be sure to contact your doctor if:    · You are not getting better after taking an antibiotic for 2 days.     · Your symptoms go away but then come back. Where can you learn more? Go to https://[email or web address].com.[email or web address] org and sign in to your [employer] account. Enter K921 in the [employer] box to learn more about \"Urinary Tract Infection (UTI) in Women: Care Instructions. \"     If you do not have an account, please click on the \"Sign Up Now\" link. Current as of: February 10, 2021               Content Version: 13.0  © 7174-9709 [employer], [employer]orporated. Care instructions adapted under license by [facility] ([facility]). If you have questions about a medical condition or this instruction, always ask your healthcare professional. [address] any warranty or liability for your use of this information.

## 2021-11-18 LAB — URINE CULTURE, ROUTINE: NORMAL

## 2021-11-27 ENCOUNTER — HOSPITAL ENCOUNTER (EMERGENCY)
Age: 61
Discharge: HOME | End: 2021-11-27
Attending: EMERGENCY MEDICINE
Payer: COMMERCIAL

## 2021-11-27 ENCOUNTER — APPOINTMENT (OUTPATIENT)
Dept: GENERAL RADIOLOGY | Age: 61
End: 2021-11-27
Payer: COMMERCIAL

## 2021-11-27 VITALS
DIASTOLIC BLOOD PRESSURE: 91 MMHG | BODY MASS INDEX: 21.03 KG/M2 | WEIGHT: 134 LBS | HEART RATE: 83 BPM | TEMPERATURE: 98 F | HEIGHT: 67 IN | SYSTOLIC BLOOD PRESSURE: 136 MMHG | RESPIRATION RATE: 18 BRPM | OXYGEN SATURATION: 98 %

## 2021-11-27 DIAGNOSIS — N64.4 BREAST PAIN: ICD-10-CM

## 2021-11-27 DIAGNOSIS — R07.81 RIB PAIN: Primary | ICD-10-CM

## 2021-11-27 PROCEDURE — 96372 THER/PROPH/DIAG INJ SC/IM: CPT

## 2021-11-27 PROCEDURE — 6370000000 HC RX 637 (ALT 250 FOR IP): Performed by: EMERGENCY MEDICINE

## 2021-11-27 PROCEDURE — 6360000002 HC RX W HCPCS: Performed by: EMERGENCY MEDICINE

## 2021-11-27 PROCEDURE — 99284 EMERGENCY DEPT VISIT MOD MDM: CPT

## 2021-11-27 PROCEDURE — 71101 X-RAY EXAM UNILAT RIBS/CHEST: CPT

## 2021-11-27 RX ORDER — HYDROCODONE BITARTRATE AND ACETAMINOPHEN 5; 325 MG/1; MG/1
1 TABLET ORAL EVERY 6 HOURS PRN
Qty: 10 TABLET | Refills: 0 | Status: SHIPPED | OUTPATIENT
Start: 2021-11-27 | End: 2021-11-30

## 2021-11-27 RX ORDER — KETOROLAC TROMETHAMINE 30 MG/ML
30 INJECTION, SOLUTION INTRAMUSCULAR; INTRAVENOUS ONCE
Status: COMPLETED | OUTPATIENT
Start: 2021-11-27 | End: 2021-11-27

## 2021-11-27 RX ORDER — ACETAMINOPHEN 500 MG
1000 TABLET ORAL ONCE
Status: COMPLETED | OUTPATIENT
Start: 2021-11-27 | End: 2021-11-27

## 2021-11-27 RX ORDER — OXYCODONE HYDROCHLORIDE AND ACETAMINOPHEN 5; 325 MG/1; MG/1
1 TABLET ORAL ONCE
Status: DISCONTINUED | OUTPATIENT
Start: 2021-11-27 | End: 2021-11-27

## 2021-11-27 RX ADMIN — KETOROLAC TROMETHAMINE 30 MG: 30 INJECTION, SOLUTION INTRAMUSCULAR; INTRAVENOUS at 14:33

## 2021-11-27 RX ADMIN — ACETAMINOPHEN 1000 MG: 500 TABLET ORAL at 14:33

## 2021-11-27 ASSESSMENT — ENCOUNTER SYMPTOMS
ABDOMINAL PAIN: 0
NAUSEA: 0
SORE THROAT: 0
VOMITING: 0
COUGH: 0
SHORTNESS OF BREATH: 0
DIARRHEA: 0
BACK PAIN: 0

## 2021-11-27 ASSESSMENT — PAIN SCALES - GENERAL
PAINLEVEL_OUTOF10: 9
PAINLEVEL_OUTOF10: 9

## 2021-11-27 ASSESSMENT — PAIN DESCRIPTION - PAIN TYPE: TYPE: ACUTE PAIN

## 2021-11-27 ASSESSMENT — PAIN DESCRIPTION - ORIENTATION: ORIENTATION: RIGHT

## 2021-11-27 ASSESSMENT — PAIN DESCRIPTION - LOCATION: LOCATION: RIB CAGE
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
## 2021-12-06 ENCOUNTER — OFFICE VISIT (OUTPATIENT)
Dept: FAMILY MEDICINE CLINIC | Age: 61
End: 2021-12-06
Payer: COMMERCIAL

## 2021-12-06 VITALS — OXYGEN SATURATION: 98 % | HEART RATE: 87 BPM | TEMPERATURE: 96.6 F

## 2021-12-06 DIAGNOSIS — T74.11XD: ICD-10-CM

## 2021-12-06 DIAGNOSIS — Z09 HOSPITAL DISCHARGE FOLLOW-UP: ICD-10-CM

## 2021-12-06 DIAGNOSIS — R07.81 RIB PAIN: Primary | ICD-10-CM

## 2021-12-06 PROCEDURE — 99213 OFFICE O/P EST LOW 20 MIN: CPT | Performed by: STUDENT IN AN ORGANIZED HEALTH CARE EDUCATION/TRAINING PROGRAM

## 2021-12-06 RX ORDER — CYCLOBENZAPRINE HCL 10 MG
TABLET ORAL
COMMUNITY
Start: 2021-12-03 | End: 2022-04-04 | Stop reason: SDUPTHER

## 2021-12-06 RX ORDER — HYDROCODONE BITARTRATE AND ACETAMINOPHEN 5; 325 MG/1; MG/1
TABLET ORAL
COMMUNITY
Start: 2021-12-03 | End: 2022-09-22

## 2021-12-06 RX ORDER — NAPROXEN 500 MG/1
TABLET ORAL
COMMUNITY
Start: 2021-12-03 | End: 2022-09-22 | Stop reason: ALTCHOICE

## 2021-12-06 ASSESSMENT — ENCOUNTER SYMPTOMS
SINUS PRESSURE: 0
ABDOMINAL PAIN: 0
SORE THROAT: 0
SHORTNESS OF BREATH: 0
VOMITING: 0
COUGH: 0
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
## 2022-01-05 ENCOUNTER — VIRTUAL VISIT (OUTPATIENT)
Dept: FAMILY MEDICINE CLINIC | Age: 62
End: 2022-01-05
Payer: COMMERCIAL

## 2022-01-05 ENCOUNTER — NURSE ONLY (OUTPATIENT)
Dept: PRIMARY CARE CLINIC | Age: 62
End: 2022-01-05

## 2022-01-05 DIAGNOSIS — R53.83 FATIGUE, UNSPECIFIED TYPE: ICD-10-CM

## 2022-01-05 DIAGNOSIS — B34.9 HEADACHE DUE TO VIRAL INFECTION: ICD-10-CM

## 2022-01-05 DIAGNOSIS — R51.9 HEADACHE DUE TO VIRAL INFECTION: ICD-10-CM

## 2022-01-05 DIAGNOSIS — R50.9 FEVER, UNSPECIFIED FEVER CAUSE: ICD-10-CM

## 2022-01-05 DIAGNOSIS — R52 GENERALIZED BODY ACHES: ICD-10-CM

## 2022-01-05 DIAGNOSIS — U07.1 COVID-19: Primary | ICD-10-CM

## 2022-01-05 LAB
INFLUENZA A ANTIBODY: NORMAL
INFLUENZA B ANTIBODY: NORMAL
Lab: ABNORMAL
PERFORMING INSTRUMENT: ABNORMAL
QC PASS/FAIL: ABNORMAL
SARS-COV-2, POC: DETECTED

## 2022-01-05 PROCEDURE — 87804 INFLUENZA ASSAY W/OPTIC: CPT | Performed by: NURSE PRACTITIONER

## 2022-01-05 PROCEDURE — 99213 OFFICE O/P EST LOW 20 MIN: CPT | Performed by: NURSE PRACTITIONER

## 2022-01-05 PROCEDURE — 87426 SARSCOV CORONAVIRUS AG IA: CPT | Performed by: NURSE PRACTITIONER

## 2022-01-05 ASSESSMENT — ENCOUNTER SYMPTOMS
NAUSEA: 0
CHEST TIGHTNESS: 0
RHINORRHEA: 0
DIARRHEA: 0
SORE THROAT: 1
COUGH: 1
SHORTNESS OF BREATH: 0

## 2022-01-05 NOTE — LETTER
[address]  Phone: [phone]  Fax: GIO Morton NP        January 6, 2022     Patient: Trisha Bose   YOB: 1960   Date of Visit: 1/5/2022         To Whom It May Concern: It is my medical opinion that Soheila Clonts may return to work on 1/13/2022 after 10 day home isolation for symptomatic COVID-19. If you have any questions or concerns, please don't hesitate to call.             Sincerely,    GIO Rosales NP

## 2022-01-05 NOTE — PROGRESS NOTES
TELEHEALTH EVALUATION -- Audio/Visual (During - public health emergency)    -   Chintan Banda is a 64 y.o. female being evaluated by a Virtual Visit (video visit) encounter to address concerns as mentioned above. A caregiver was present when appropriate. Due to this being a TeleHealth encounter (During ZO- public health emergency), evaluation of the following organ systems was limited: Vitals/Constitutional/EENT/Resp/CV/GI//MS/Neuro/Skin/Heme-Lymph-Imm. Pursuant to the emergency declaration under the [address] [place] [address], [address] [place] [address] authority and the [employer] Act, this Virtual Visit was conducted with patient's (and/or legal guardian's) consent, to reduce the patient's risk of exposure to COVID-19 and provide necessary medical care. The patient (and/or legal guardian) has also been advised to contact this office for worsening conditions or problems, and seek emergency medical treatment and/or call 911 if deemed necessary. Patient was contacted and agreed to proceed with a virtual visit via Telephone Visit  The risks and benefits of converting to a virtual visit were discussed in light of the current infectious disease epidemic. Patient also understood that insurance coverage and co-pays are up to their individual insurance plans. Patient was located at their home. Provider was located at their office. 2022  Chintan Banda (:  1960) has requested an audio/video evaluation for the following concern(s):    HPI  VV audio for viral testing   Monday symptoms started  Chills, h/a, sore throat, cough & body aches  Cough is dry   Denies chest tightness or SOB  Denies chest pain   Denies GI abnormalities   Fatigued   Tmax 100.1F last night. Took Tylenol   Sleep interrupted                 Review of Systems   Constitutional: Positive for chills and fatigue.  Negative for activity change, appetite change, diaphoresis and fever. HENT: Positive for sore throat. Negative for ear pain and rhinorrhea. Respiratory: Positive for cough. Negative for chest tightness and shortness of breath. Cardiovascular: Negative for chest pain and palpitations. Gastrointestinal: Negative for diarrhea and nausea. Musculoskeletal: Positive for myalgias. Skin: Negative for rash. Neurological: Positive for headaches. Negative for dizziness and light-headedness. Psychiatric/Behavioral: Positive for sleep disturbance. Prior to Visit Medications    Medication Sig Taking?  Authorizing Provider   naproxen (NAPROSYN) 500 MG tablet   Historical Provider, MD   HYDROcodone-acetaminophen ([address]) 5-325 MG per tablet   Historical Provider, MD   cyclobenzaprine (FLEXERIL) 10 MG tablet   Historical Provider, MD   hydrOXYzine (ATARAX) 25 MG tablet Take 25 mg by mouth as needed   Historical Provider, MD   ibuprofen (ADVIL) 200 MG tablet Take 200 mg by mouth once  Historical Provider, MD   celecoxib (CELEBREX) 200 MG capsule Take 200 mg by mouth 2 times daily  Historical Provider, MD       Past Medical History:   Diagnosis Date    Cancer ([facility] [address]     Chronic back pain      Past Surgical History:   Procedure Laterality Date     SECTION      COLONOSCOPY      COLONOSCOPY N/A 2021    COLORECTAL CANCER SCREENING with polypectomies performed by Sandy Young MD at [place] [address], TOTAL ABDOMINAL      LAPAROSCOPY      OVARY REMOVAL      SKIN CANCER EXCISION      basal cell skin cancer excised per patient     Social History     Socioeconomic History    Marital status:      Spouse name: Not on file    Number of children: Not on file    Years of education: Not on file    Highest education level: Not on file   Occupational History    Not on file   Tobacco Use    Smoking status: Current Every Day Smoker     Packs/day: 1.00     Years: 30.00     Pack years: 30.00    Smokeless tobacco: Never Used   Vaping Use    Vaping Use: Never used   Substance and Sexual Activity    Alcohol use: Yes    Drug use: Never    Sexual activity: Not on file   Other Topics Concern    Not on file   Social History Narrative    Not on file     Social Determinants of Health     Financial Resource Strain: Low Risk     Difficulty of Paying Living Expenses: Not hard at all   Food Insecurity: No Food Insecurity    Worried About Running Out of Food in the Last Year: Never true    [address] in the Last Year: Never true   Transportation Needs: No Transportation Needs    Lack of Transportation (Medical): No    Lack of Transportation (Non-Medical): No   Physical Activity:     Days of Exercise per Week: Not on file    Minutes of Exercise per Session: Not on file   Stress:     Feeling of Stress : Not on file   Social Connections:     Frequency of Communication with Friends and Family: Not on file    Frequency of Social Gatherings with Friends and Family: Not on file    Attends Gnosticist Services: Not on file    Active Member of [address] [place] [address] or Organizations: Not on file    Attends Club or Organization Meetings: Not on file    Marital Status: Not on file   Intimate Partner Violence:     Fear of Current or Ex-Partner: Not on file    Emotionally Abused: Not on file    Physically Abused: Not on file    Sexually Abused: Not on file   Housing Stability:     Unable to Pay for Housing in the Last Year: Not on file    Number of [place] in the Last Year: Not on file    Unstable Housing in the Last Year: Not on file     Family History   Problem Relation Age of Onset    Heart Attack Father     Stroke Father     Prostate Cancer Brother     Cancer Maternal Grandmother     Stroke Maternal Grandmother     Cancer Maternal Grandfather     Diabetes Maternal Grandfather     Kidney Disease Neg Hx      No Known Allergies    PMH, Surgical Hx, Family Hx, and Social Hx reviewed and updated.           PHYSICAL EXAMINATION: N/A. VV Audio    Oriented and conversant   No audible distress   No cough throughout visit           Other pertinent observable physical exam findings-   Results for orders placed or performed in visit on 01/05/22   POCT COVID-19, Antigen   Result Value Ref Range    SARS-COV-2, POC Detected (A) Not Detected    Lot Number [id number]     QC Pass/Fail pass     Performing Instrument BD Veritor    POCT Influenza A/B   Result Value Ref Range    Influenza A Ab neg     Influenza B Ab neg        ASSESSMENT/PLAN:  Assessment & Plan   Miguel Jeronimo was seen today for concern for covid-19. Diagnoses and all orders for this visit:    COVID-19    Fever, unspecified fever cause  -     POCT COVID-19, Antigen  -     POCT Influenza A/B    Headache due to viral infection  -     POCT COVID-19, Antigen  -     POCT Influenza A/B    Fatigue, unspecified type  -     POCT COVID-19, Antigen  -     POCT Influenza A/B    Generalized body aches  -     POCT COVID-19, Antigen  -     POCT Influenza A/B      Orders Placed This Encounter   Procedures    POCT COVID-19, Antigen     Order Specific Question:   Is this test for diagnosis or screening? Answer:   Diagnosis of ill patient     Order Specific Question:   Symptomatic for COVID-19 as defined by CDC? Answer:   Yes     Order Specific Question:   Date of Symptom Onset     Answer:   1/3/2022     Order Specific Question:   Hospitalized for COVID-19? Answer:   No     Order Specific Question:   Admitted to ICU for COVID-19? Answer:   No     Order Specific Question:   Employed in healthcare setting? Answer:   No     Order Specific Question:   Resident in a congregate (group) care setting? Answer:   No     Order Specific Question:   Pregnant? Answer:   No     Order Specific Question:   Previously tested for COVID-19? Answer: Yes    POCT Influenza A/B     No orders of the defined types were placed in this encounter.     There are no discontinued medications. If you experience any of the red flag s/s, seek care at the ER        Reviewed with the patient: current clinical status & that she has tested positive for COVID-19. Pt aware to remain on home isolation d/t test result today. Pt instructed on red flag s/s to go to the ER for or to call 911. Pt verbalized understanding. When to call for help  Call 911 anytime you think you may need emergency care. For example, call if:  · You have severe trouble breathing. · You have severe dehydration. I have reviewed the patient's medical history in detail and updated the computerized patient record. Patient identification was verified at the start of the visit: Yes  Total time spent on this encounter: 20 minutes  >50% of 20 minutes was spent spent on counseling, answering questions, instructions on meds & testing & coordinating the care based on my plan and assessment as noted. --GIO Bañuelos - NP on 1/6/2022 at 1:36 AM    An electronic signature was used to authenticate this note.

## 2022-03-22 ENCOUNTER — OFFICE VISIT (OUTPATIENT)
Dept: FAMILY MEDICINE CLINIC | Age: 62
End: 2022-03-22
Payer: COMMERCIAL

## 2022-03-22 VITALS — BODY MASS INDEX: 21.03 KG/M2 | WEIGHT: 134 LBS | TEMPERATURE: 97.6 F | HEIGHT: 67 IN

## 2022-03-22 DIAGNOSIS — C44.91 CANCER OF THE SKIN, BASAL CELL: Primary | ICD-10-CM

## 2022-03-22 PROCEDURE — 99212 OFFICE O/P EST SF 10 MIN: CPT | Performed by: FAMILY MEDICINE

## 2022-03-22 ASSESSMENT — PATIENT HEALTH QUESTIONNAIRE - PHQ9
SUM OF ALL RESPONSES TO PHQ9 QUESTIONS 1 & 2: 0
2. FEELING DOWN, DEPRESSED OR HOPELESS: 0
SUM OF ALL RESPONSES TO PHQ QUESTIONS 1-9: 0
1. LITTLE INTEREST OR PLEASURE IN DOING THINGS: 0
SUM OF ALL RESPONSES TO PHQ QUESTIONS 1-9: 0

## 2022-03-22 NOTE — PROGRESS NOTES
Diagnosis Orders   1. Cancer of the skin, basal cell buttock 2021       Return in about 6 months (around 2022) for 15 min skin check. Patient Instructions   Patient has normal skin exam.    If no signs of cancer or precancerous lesions at next appointment. Consider switching to 1 year follow-up. Subjective:      Patient ID: Isaak Contreras is a 64 y.o. female who presents for:  Chief Complaint   Patient presents with    Skin Exam     x 6 month skin check     Skin Problem     no new areas of concern        Patient here for routine skin check related to history of skin cancer. 6 months since last exam.  No obvious changes. Current Outpatient Medications on File Prior to Visit   Medication Sig Dispense Refill    naproxen (NAPROSYN) 500 MG tablet       HYDROcodone-acetaminophen (NORCO) 5-325 MG per tablet       cyclobenzaprine (FLEXERIL) 10 MG tablet       hydrOXYzine (ATARAX) 25 MG tablet Take 25 mg by mouth as needed       ibuprofen (ADVIL) 200 MG tablet Take 200 mg by mouth once      celecoxib (CELEBREX) 200 MG capsule Take 200 mg by mouth 2 times daily       No current facility-administered medications on file prior to visit.      Past Medical History:   Diagnosis Date    Cancer ([facility] [address]     Chronic back pain      Past Surgical History:   Procedure Laterality Date     SECTION      COLONOSCOPY      COLONOSCOPY N/A 2021    COLORECTAL CANCER SCREENING with polypectomies performed by Isabel Cosme MD at [place] [address], TOTAL ABDOMINAL      LAPAROSCOPY      OVARY REMOVAL      SKIN CANCER EXCISION      basal cell skin cancer excised per patient     Social History     Socioeconomic History    Marital status:      Spouse name: Not on file    Number of children: Not on file    Years of education: Not on file    Highest education level: Not on file   Occupational History    Not on file   Tobacco Use    Smoking status: Current Every Day Smoker Packs/day: 1.00     Years: 30.00     Pack years: 30.00    Smokeless tobacco: Never Used   Vaping Use    Vaping Use: Never used   Substance and Sexual Activity    Alcohol use: Yes    Drug use: Never    Sexual activity: Not on file   Other Topics Concern    Not on file   Social History Narrative    Not on file     Social Determinants of Health     Financial Resource Strain: Low Risk     Difficulty of Paying Living Expenses: Not hard at all   Food Insecurity: No Food Insecurity    Worried About Running Out of Food in the Last Year: Never true    [address] in the Last Year: Never true   Transportation Needs: No Transportation Needs    Lack of Transportation (Medical): No    Lack of Transportation (Non-Medical):  No   Physical Activity:     Days of Exercise per Week: Not on file    Minutes of Exercise per Session: Not on file   Stress:     Feeling of Stress : Not on file   Social Connections:     Frequency of Communication with Friends and Family: Not on file    Frequency of Social Gatherings with Friends and Family: Not on file    Attends Scientologist Services: Not on file    Active Member of [address] [place] [address] or Organizations: Not on file    Attends Club or Organization Meetings: Not on file    Marital Status: Not on file   Intimate Partner Violence:     Fear of Current or Ex-Partner: Not on file    Emotionally Abused: Not on file    Physically Abused: Not on file    Sexually Abused: Not on file   Housing Stability:     Unable to Pay for Housing in the Last Year: Not on file    Number of [place] in the Last Year: Not on file    Unstable Housing in the Last Year: Not on file     Family History   Problem Relation Age of Onset    Heart Attack Father     Stroke Father     Prostate Cancer Brother     Cancer Maternal Grandmother     Stroke Maternal Grandmother     Cancer Maternal Grandfather     Diabetes Maternal Grandfather     Kidney Disease Neg Hx      Allergies:  Patient has no known allergies. Review of Systems   Constitutional: Negative for chills and fever. Allergic/Immunologic: Negative for environmental allergies, food allergies and immunocompromised state. Hematological: Negative for adenopathy. Does not bruise/bleed easily. Psychiatric/Behavioral: Negative for behavioral problems and sleep disturbance. Objective:   Temp 97.6 °F (36.4 °C)   Ht 5' 7\" (1.702 m)   Wt 134 lb (60.8 kg)   BMI 20.99 kg/m²       No results found for this visit on 03/22/22. Recent Results (from the past 2016 hour(s))   POCT COVID-19, Antigen    Collection Time: 01/05/22  4:58 PM   Result Value Ref Range    SARS-COV-2, POC Detected (A) Not Detected    Lot Number [id number]     QC Pass/Fail pass     Performing Instrument BD Veritor    POCT Influenza A/B    Collection Time: 01/05/22  4:58 PM   Result Value Ref Range    Influenza A Ab neg     Influenza B Ab neg        [] Pt was seen by provider for      Minutes  Counseling and coordination of care was done for all assessment diagnosis listed for today with patient and any family/friend present. More than 50% of this visit was spent coordinating current care, obtaining information for prior records, and counseling for current plan of action. Assessment:       Diagnosis Orders   1. Cancer of the skin, basal cell buttock 9/2021           No orders of the defined types were placed in this encounter. No orders of the defined types were placed in this encounter. Medication List          Accurate as of March 22, 2022  2:05 PM. If you have any questions, ask your nurse or doctor.             CONTINUE taking these medications    Advil 200 MG tablet  Generic drug: ibuprofen     celecoxib 200 MG capsule  Commonly known as: CELEBREX     cyclobenzaprine 10 MG tablet  Commonly known as: FLEXERIL     HYDROcodone-acetaminophen 5-325 MG per tablet  Commonly known as: NORCO     hydrOXYzine 25 MG tablet  Commonly known as: ATARAX     naproxen 500 MG tablet  Commonly known as: NAPROSYN              Plan:   Return in about 6 months (around 9/22/2022) for 15 min skin check. Patient Instructions   Patient has normal skin exam.    If no signs of cancer or precancerous lesions at next appointment. Consider switching to 1 year follow-up. This note was partially created with the assistance of dictation. This may lead to grammatical or spelling errors. Dioni Archer M.D.

## 2022-03-22 NOTE — PROGRESS NOTES
1. Skin cancer, basal cell         Return in about 6 months (around 3/22/2022) for 15 min skin check.   Patient Instructions   Wound will finish healing by secondary intention

## 2022-04-01 ENCOUNTER — OFFICE VISIT (OUTPATIENT)
Dept: FAMILY MEDICINE CLINIC | Age: 62
End: 2022-04-01
Payer: COMMERCIAL

## 2022-04-01 VITALS
HEIGHT: 67 IN | OXYGEN SATURATION: 99 % | BODY MASS INDEX: 21.35 KG/M2 | TEMPERATURE: 96.6 F | DIASTOLIC BLOOD PRESSURE: 72 MMHG | SYSTOLIC BLOOD PRESSURE: 130 MMHG | WEIGHT: 136 LBS | HEART RATE: 78 BPM

## 2022-04-01 DIAGNOSIS — R10.31 RIGHT LOWER QUADRANT ABDOMINAL PAIN: Primary | ICD-10-CM

## 2022-04-01 DIAGNOSIS — R10.9 RIGHT FLANK PAIN: ICD-10-CM

## 2022-04-01 LAB
BILIRUBIN, POC: NORMAL
BLOOD URINE, POC: NORMAL
CLARITY, POC: CLEAR
COLOR, POC: YELLOW
GLUCOSE URINE, POC: NORMAL
KETONES, POC: NORMAL
LEUKOCYTE EST, POC: NORMAL
NITRITE, POC: NORMAL
PH, POC: 6
PROTEIN, POC: NORMAL
SPECIFIC GRAVITY, POC: 1.01
UROBILINOGEN, POC: 3.5

## 2022-04-01 PROCEDURE — 81003 URINALYSIS AUTO W/O SCOPE: CPT | Performed by: STUDENT IN AN ORGANIZED HEALTH CARE EDUCATION/TRAINING PROGRAM

## 2022-04-01 PROCEDURE — 99213 OFFICE O/P EST LOW 20 MIN: CPT | Performed by: STUDENT IN AN ORGANIZED HEALTH CARE EDUCATION/TRAINING PROGRAM

## 2022-04-01 ASSESSMENT — ENCOUNTER SYMPTOMS
VOMITING: 0
SHORTNESS OF BREATH: 0
COUGH: 0
ABDOMINAL PAIN: 0
SINUS PRESSURE: 0
SORE THROAT: 0
BACK PAIN: 1

## 2022-04-01 NOTE — PROGRESS NOTES
2022    Demetrius Akbar (:  1960) is a 64 y.o. female, here for evaluation of the following medical concerns:  Chief Complaint   Patient presents with    Back Pain     Right side. x1 month Pt has been taking ibuprofen.  Abdominal Pain     HPI  Right sided back pain  Symptoms started 1 month ago  No new activity, no injury/truama  Symptoms started \"out of no where\" while patient was sitting at her friends house  Has been doing ibuprofen for the pain with minimal relief  Pain comes and goes  No hx kidney stones  Movement makes it worse  Denied hematuria, dysuria, urinary urgency or frequency    XR lumbar spine 3/22 with mild degenerative changes -ordered by pain management    Review of Systems   Constitutional: Negative for chills and fever. HENT: Negative for congestion, sinus pressure and sore throat. Respiratory: Negative for cough and shortness of breath. Cardiovascular: Negative for chest pain and palpitations. Gastrointestinal: Negative for abdominal pain and vomiting. Genitourinary: Positive for flank pain. Negative for frequency, hematuria and urgency. Musculoskeletal: Positive for back pain. Negative for arthralgias and myalgias. Skin: Negative for rash and wound. Neurological: Negative for speech difficulty and light-headedness. Psychiatric/Behavioral: Negative for suicidal ideas. The patient is not nervous/anxious. Prior to Visit Medications    Medication Sig Taking?  Authorizing Provider   naproxen (NAPROSYN) 500 MG tablet  Yes Historical Provider, MD   HYDROcodone-acetaminophen (Lynne Lock) 5-325 MG per tablet  Yes Historical Provider, MD   cyclobenzaprine (FLEXERIL) 10 MG tablet  Yes Historical Provider, MD   hydrOXYzine (ATARAX) 25 MG tablet Take 25 mg by mouth as needed  Yes Historical Provider, MD   ibuprofen (ADVIL) 200 MG tablet Take 200 mg by mouth once Yes Historical Provider, MD   celecoxib (CELEBREX) 200 MG capsule Take 200 mg by mouth 2 times daily Yes Historical Provider, MD        There are no discontinued medications. No Known Allergies    Past Medical History:   Diagnosis Date    Cancer ([facility] [address]     Chronic back pain        Past Surgical History:   Procedure Laterality Date     SECTION      COLONOSCOPY      COLONOSCOPY N/A 2021    COLORECTAL CANCER SCREENING with polypectomies performed by Paresh Uriarte MD at [place] [address], TOTAL ABDOMINAL      LAPAROSCOPY      OVARY REMOVAL      SKIN CANCER EXCISION      basal cell skin cancer excised per patient       Social History     Socioeconomic History    Marital status:      Spouse name: Not on file    Number of children: Not on file    Years of education: Not on file    Highest education level: Not on file   Occupational History    Not on file   Tobacco Use    Smoking status: Current Every Day Smoker     Packs/day: 1.00     Years: 30.00     Pack years: 30.00    Smokeless tobacco: Never Used   Vaping Use    Vaping Use: Never used   Substance and Sexual Activity    Alcohol use: Yes    Drug use: Never    Sexual activity: Not on file   Other Topics Concern    Not on file   Social History Narrative    Not on file     Social Determinants of Health     Financial Resource Strain: Low Risk     Difficulty of Paying Living Expenses: Not hard at all   Food Insecurity: No Food Insecurity    Worried About Running Out of Food in the Last Year: Never true    [address] in the Last Year: Never true   Transportation Needs: No Transportation Needs    Lack of Transportation (Medical): No    Lack of Transportation (Non-Medical):  No   Physical Activity:     Days of Exercise per Week: Not on file    Minutes of Exercise per Session: Not on file   Stress:     Feeling of Stress : Not on file   Social Connections:     Frequency of Communication with Friends and Family: Not on file    Frequency of Social Gatherings with Friends and Family: Not on file    Attends Yazidi Services: Not on file    Active Member of Clubs or Organizations: Not on file    Attends Club or Organization Meetings: Not on file    Marital Status: Not on file   Intimate Partner Violence:     Fear of Current or Ex-Partner: Not on file    Emotionally Abused: Not on file    Physically Abused: Not on file    Sexually Abused: Not on file   Housing Stability:     Unable to Pay for Housing in the Last Year: Not on file    Number of [place] in the Last Year: Not on file    Unstable Housing in the Last Year: Not on file        Family History   Problem Relation Age of Onset    Heart Attack Father     Stroke Father     Prostate Cancer Brother     Cancer Maternal Grandmother     Stroke Maternal Grandmother     Cancer Maternal Grandfather     Diabetes Maternal Grandfather     Kidney Disease Neg Hx        Vitals:    04/01/22 1216   BP: 130/72   Pulse: 78   Temp: 96.6 °F (35.9 °C)   SpO2: 99%   Weight: 136 lb (61.7 kg)   Height: 5' 7\" (1.702 m)       Estimated body mass index is 21.3 kg/m² as calculated from the following:    Height as of this encounter: 5' 7\" (1.702 m). Weight as of this encounter: 136 lb (61.7 kg). No results for input(s): WBC, RBC, HGB, HCT, MCV, MCH, MCHC, RDW, PLT, MPV in the last 72 hours. No results for input(s): NA, K, CL, CO2, BUN, CREATININE, GLUCOSE, CALCIUM, PROT, LABALBU, BILITOT, ALKPHOS, AST, ALT in the last 72 hours. No results found for: LABA1C    XR LUMBAR SPINE (MIN 4 VIEWS)    Result Date: 3/23/2022  MILD DEGENERATIVE CHANGE. Physical Exam  Constitutional:       General: She is not in acute distress. Appearance: Normal appearance. HENT:      Head: Normocephalic and atraumatic. Eyes:      Extraocular Movements: Extraocular movements intact. Conjunctiva/sclera: Conjunctivae normal.   Abdominal:      General: Abdomen is flat. Palpations: Abdomen is soft. Tenderness: There is no abdominal tenderness.  There is no guarding or rebound. Musculoskeletal:         General: No deformity. Thoracic back: Tenderness (Mild tenderness of the right lower paraspinal musculature) present. No swelling, edema, deformity, signs of trauma, spasms or bony tenderness. Decreased range of motion (Reduce flexion extension of the spine secondary to pain). Comments: Pain with side bending and rotation of the thoracic spine to the right   Skin:     Findings: No lesion or rash. Neurological:      General: No focal deficit present. Mental Status: She is alert. Mental status is at baseline. Psychiatric:         Mood and Affect: Mood normal.         Behavior: Behavior normal.         Thought Content: Thought content normal.       ASSESSMENT/PLAN:  1. Right lower quadrant abdominal pain  We will rule out kidney stone due to right flank pain with some radiation into the right groin  If x-ray negative likely muscle spasm/strain of deep musculature of the right thoracic region and will start treatment with muscle relaxer    - POCT Urinalysis No Micro (Auto)  - XR ABDOMEN (KUB) (SINGLE AP VIEW); Future    2. Right flank pain        There are no discontinued medications.    ---------------------------------------------------------------------  Side effects, adverse effects of the medication prescribed today, as well as treatment plan/ rationale and result expectations have been discussed with the patient who expresses understanding and desires to proceed. Close follow up to evaluate treatment results and for coordination of care. I have reviewed the patient's medical history in detail and updated the computerized patient record. As always, patient is advised that if symptoms worsen in any way they will proceed to the nearest emergency room. --------------------------------------------------------------------    Return if symptoms worsen or fail to improve.     An  electronic signature was used to authenticate this note.    --Brigido Herrera,  on 4/1/2022 at 12:35 PM

## 2022-04-04 DIAGNOSIS — T14.8XXA MUSCULOSKELETAL STRAIN: Primary | ICD-10-CM

## 2022-04-04 RX ORDER — CYCLOBENZAPRINE HCL 10 MG
10 TABLET ORAL 3 TIMES DAILY PRN
Qty: 42 TABLET | Refills: 0 | Status: SHIPPED | OUTPATIENT
Start: 2022-04-04 | End: 2022-04-18

## 2022-04-04 RX ORDER — CYCLOBENZAPRINE HCL 10 MG
TABLET ORAL
Status: CANCELLED | OUTPATIENT
Start: 2022-04-04

## 2022-05-10 ENCOUNTER — HOSPITAL ENCOUNTER (OUTPATIENT)
Dept: MRI IMAGING | Age: 62
Discharge: HOME | End: 2022-05-12
Payer: COMMERCIAL

## 2022-05-10 DIAGNOSIS — M54.16 LUMBAR RADICULOPATHY: ICD-10-CM

## 2022-05-10 DIAGNOSIS — M54.50 LOW BACK PAIN, UNSPECIFIED BACK PAIN LATERALITY, UNSPECIFIED CHRONICITY, UNSPECIFIED WHETHER SCIATICA PRESENT: ICD-10-CM

## 2022-05-10 PROCEDURE — 72148 MRI LUMBAR SPINE W/O DYE: CPT

## 2022-06-24 ENCOUNTER — TELEPHONE (OUTPATIENT)
Dept: WOMENS IMAGING | Age: 62
End: 2022-06-24

## 2022-09-15 ENCOUNTER — TELEPHONE (OUTPATIENT)
Dept: FAMILY MEDICINE CLINIC | Age: 62
End: 2022-09-15

## 2022-09-15 DIAGNOSIS — Z72.0 TOBACCO ABUSE DISORDER: Primary | ICD-10-CM

## 2022-09-22 ENCOUNTER — OFFICE VISIT (OUTPATIENT)
Dept: FAMILY MEDICINE CLINIC | Age: 62
End: 2022-09-22
Payer: COMMERCIAL

## 2022-09-22 VITALS — BODY MASS INDEX: 21.35 KG/M2 | TEMPERATURE: 98.6 F | WEIGHT: 136 LBS | HEIGHT: 67 IN

## 2022-09-22 DIAGNOSIS — C44.91 CANCER OF THE SKIN, BASAL CELL: ICD-10-CM

## 2022-09-22 DIAGNOSIS — D49.2 ABNORMAL SKIN GROWTH: ICD-10-CM

## 2022-09-22 DIAGNOSIS — D49.2 ABNORMAL SKIN GROWTH: Primary | ICD-10-CM

## 2022-09-22 DIAGNOSIS — L98.9 CHANGING SKIN LESION: ICD-10-CM

## 2022-09-22 PROCEDURE — 11102 TANGNTL BX SKIN SINGLE LES: CPT | Performed by: FAMILY MEDICINE

## 2022-09-22 PROCEDURE — 99214 OFFICE O/P EST MOD 30 MIN: CPT | Performed by: FAMILY MEDICINE

## 2022-09-22 PROCEDURE — 11305 SHAVE SKIN LESION 0.5 CM/<: CPT | Performed by: FAMILY MEDICINE

## 2022-09-22 ASSESSMENT — ENCOUNTER SYMPTOMS: COLOR CHANGE: 1

## 2022-09-22 NOTE — PROGRESS NOTES
1. Cancer of the skin, basal cell buttock 9/2021         Return in about 6 months (around 9/22/2022) for 15 min skin check. Patient Instructions   Patient has normal skin exam.     If no signs of cancer or precancerous lesions at next appointment. Consider switching to 1 year follow-up.

## 2022-09-22 NOTE — PROGRESS NOTES
Diagnosis Orders   1. Abnormal skin growth  MI DESTRUC PREMALIGNANT, FIRST LESION    MI DESTRUC PREMALIGNANT,2-14 LESIONS      2. Changing skin lesion  [facility] DESTRUC PREMALIGNANT, FIRST LESION    MI DESTRUC PREMALIGNANT,2-14 LESIONS      3. Cancer of the skin, basal cell buttock 9/2021  MI DESTRUC PREMALIGNANT, FIRST LESION    MI DESTRUC PREMALIGNANT,2-14 LESIONS          Return in about 6 months (around 3/22/2023) for 15 min skin check. Orders Placed This Encounter   Procedures     [address], FIRST LESION     [facility]       Kym Escalante was seen today for skin exam.    Diagnoses and all orders for this visit:    Abnormal skin growth  -     [facility] DESTRUC PREMALIGNANT, FIRST LESION  -      [address]    Changing skin lesion  -     [facility] DESTRUC PREMALIGNANT, FIRST LESION  -     [facility] of the skin, basal cell buttock 9/2021  -     [facility] DESTRUC PREMALIGNANT, FIRST LESION  -      [facility] [address]      Return in about 6 months (around 3/22/2023) for 15 min skin check. Patient Instructions   Cryotherapy instructions    Post op instructions given. A printed copy provided. It is best to leave blisters alone if they form for the first 1-3 days to allow the desired damaged tissue (precancer lesion, wart, or whatever lesion is being removed) to separate from healthy tissue. The area should be covered with a bandage to prevent blister breakage and dirt exposure. The wounds should remain dry while there is a blister, therefore if this is a sweaty location, like the foot ,you may need to change clothing, such as socks, multiple times per day. When the blister(s) pop or patient removes the top as instructed between day 3-5, apply antibiotic (NOT triple antibiotic, one brand is Neosporin) ointment, usually Bacitracin, and a bandage to affected area(s).   The ointment should be applied to the open area as long as it is not covered with skin. Exposed tissue is meant to be moist.      Once a scab is formed the patient may stop applying ointment. The scab may appear yellow while moist, don't confuse this with infection. If the wound is infection pus will drain from the site. If this treatment was for a large wart you may note that a plug of skin may fall out of the area that was treated. That is the center of the wart and it is appropriate for it to come out. If exposed skin remains, treat that area as you would a ruptured blister as mentioned above. Bacitracin sample supplied                 Pt notes new lesions on buttock growing and changing. History of basal cell cancer      Current Outpatient Medications on File Prior to Visit   Medication Sig Dispense Refill    naproxen (NAPROSYN) 500 MG tablet       HYDROcodone-acetaminophen (NORCO) 5-325 MG per tablet       hydrOXYzine (ATARAX) 25 MG tablet Take 25 mg by mouth as needed       ibuprofen (ADVIL;MOTRIN) 200 MG tablet Take 200 mg by mouth once      celecoxib (CELEBREX) 200 MG capsule Take 200 mg by mouth 2 times daily       No current facility-administered medications on file prior to visit. Patient has no known allergies. Review of Systems   Constitutional:  Negative for chills and fever. Skin:  Positive for color change. Allergic/Immunologic: Negative for environmental allergies, food allergies and immunocompromised state. Hematological:  Negative for adenopathy. Does not bruise/bleed easily. Psychiatric/Behavioral:  Negative for behavioral problems and sleep disturbance. Temp 98.6 °F (37 °C)   Ht 5' 7\" (1.702 m)   Wt 136 lb (61.7 kg)   BMI 21.30 kg/m²   Physical Exam  Constitutional:       General: She is not in acute distress. Appearance: Normal appearance. She is well-developed. She is not toxic-appearing. HENT:      Head: Normocephalic and atraumatic.       Right Ear: Hearing and tympanic membrane normal.      Left Ear: Hearing and tympanic membrane normal.      Nose: Nose normal. No nasal deformity. Eyes:      General: Lids are normal.         Right eye: No discharge. Left eye: No discharge. Conjunctiva/sclera: Conjunctivae normal.      Pupils: Pupils are equal, round, and reactive to light. Neck:      Thyroid: No thyroid mass or thyromegaly. Vascular: No JVD. Trachea: Trachea and phonation normal.   Cardiovascular:      Rate and Rhythm: Normal rate and regular rhythm. Pulmonary:      Effort: No accessory muscle usage or respiratory distress. Musculoskeletal:      Cervical back: Full passive range of motion without pain. Comments: FROM all large muscle groups and joints as witnessed when walking to exam table, getting on, and getting off the exam table. Skin:     General: Skin is warm and dry. Findings: No rash. Comments: R buttock 2 lesions raised and irregular. 4 mm each   Neurological:      Mental Status: She is alert. Motor: No tremor or atrophy. Gait: Gait normal.   Psychiatric:         Speech: Speech normal.         Behavior: Behavior normal.         Thought Content: Thought content normal.         Procedure consent  The procedure was discussed with the patient. All questions were answered and alternative options discussed. The patient is aware of the risks of bleeding, infection, unsatisfactory scar result. Informed consent paperwork was signed by the patient. Liquid nitrogen was applied for 2-6 seconds to affected areas with thaw allowed between dosing for a total of 2 applications. Applied to 2 lesion(s). The patient tolerated the procedure well. Diagnosis Orders   1. Abnormal skin growth  [place] DESTRUC PREMALIGNANT, FIRST LESION    FL DESTRUC PREMALIGNANT,2-14 LESIONS      2. Changing skin lesion  [place] DESTRUC PREMALIGNANT, FIRST LESION    FL DESTRUC PREMALIGNANT,2-14 LESIONS      3.  Cancer of the skin, basal cell buttock 9/2021  FL DESTRUC

## 2022-09-22 NOTE — PROGRESS NOTES
Diagnosis Orders   1. Abnormal skin growth  OR SHAV SKIN LES <5MM REMAINDR BODY    Specimen to Pathology Outpatient    [id number] - OR TANGENTIAL BIOPSY SKIN SINGLE LESION      2. Changing skin lesion  OR SHAV SKIN LES <5MM REMAINDR BODY    Specimen to Pathology Outpatient    [id number] - OR TANGENTIAL BIOPSY SKIN SINGLE LESION      3. Cancer of the skin, basal cell buttock 9/2021  OR SHAV SKIN LES <5MM REMAINDR BODY    Specimen to Pathology Outpatient    [id number] - OR TANGENTIAL BIOPSY SKIN SINGLE LESION            Orders Placed This Encounter   Procedures    Specimen to Pathology Outpatient     Margins requested on all specimens  R/O BCC  Location R buttock inferiorly in the supine position     Standing Status:   Future     Standing Expiration Date:   9/22/2023     Order Specific Question:   PREVIOUS BIOPSY     Answer:   No     Order Specific Question:   PREOP DIAGNOSIS     Answer:   abnl skin growth, changing skin lesion     Order Specific Question:   FROZEN SECTION - NO OR YES/SPECIMEN     Answer:   No    OR SHAV SKIN LES <5MM REMAINDR BODY     R buttock inferiorly in supine position    [id number] - OR TANGENTIAL BIOPSY SKIN SINGLE LESION     R buttock superiorly in the supine position         Ayad Minor was seen today for skin exam.    Diagnoses and all orders for this visit:    Abnormal skin growth  -     Cancel: OR DESTRUC PREMALIGNANT, FIRST LESION  -     Cancel: OR DESTRUC PREMALIGNANT,2-14 LESIONS  -     OR SHAV SKIN LES <5MM REMAINDR BODY  -     Specimen to Pathology Outpatient; Future  -     [id number] - OR TANGENTIAL BIOPSY SKIN SINGLE LESION    Changing skin lesion  -     Cancel: OR DESTRUC PREMALIGNANT, FIRST LESION  -     Cancel: OR DESTRUC PREMALIGNANT,2-14 LESIONS  -     OR SHAV SKIN LES <5MM REMAINDR BODY  -     Specimen to Pathology Outpatient;  Future  -     [id number] - OR TANGENTIAL BIOPSY SKIN SINGLE LESION    Cancer of the skin, 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 Patient Instructions   Incision/Laceration repair    -Clean surgical area with antibacterial soap and water once daily. --You may be instructed to soak the wound with Hydrogen Peroxide to loosen scabbing around sutures, this is not to be done more often that every 3 days, should be for 30 seconds-1 min and then rinsed off with water.     -Keep surgical site moist with vaseline or antibiotic ointment (single- Bacitracin, not triple antibiotic or Neosporin) and apply a fresh bandage daily until a solid scab forms or if the wound is at risk for trauma or dirt. It is important to leave the wound uncovered part of the day to allow the skin to dry and avoid breakdown from excessive moisture. There may be exceptions to this, the staff will provide information if your wound requires a variation in this, that will often involve a prescription ointment or cream.     -Follow up immediately if any growing redness (minimal redness or pale pink is normal along wound edges) surrounds the surgical site or if dripping drainage occurs at surgical site. Once a solid scab forms no more bandage needed. A wet scab can look yellow. This is not infection, just moisture.    -Once the lesion is healed be sure to apply sunscreen to the area to prevent burn of the newer and more delicate skin especially during the first 6 months of healing.        -If the scar begins to be raised you may massage the area firmly twice a day to help break down scar tissue and help the area become a flat scar. There is some evidence that Mederma applied to a scar daily for the first few months can help shrink and fade it more quickly then without intervention.                    Pt has noted new and changing lesions on r buttock with history of basal cell cancer      No current outpatient medications on file prior to visit. No current facility-administered medications on file prior to visit. Patient has no known allergies. Review of Systems   Constitutional:  Negative for chills and fever. Skin:  Positive for color change. Allergic/Immunologic: Negative for environmental allergies, food allergies and immunocompromised state. Hematological:  Negative for adenopathy. Does not bruise/bleed easily. Psychiatric/Behavioral:  Negative for behavioral problems and sleep disturbance. Temp 98.6 °F (37 °C)   Ht 5' 7\" (1.702 m)   Wt 136 lb (61.7 kg)   BMI 21.30 kg/m²   Physical Exam  Constitutional:       General: She is not in acute distress. Appearance: Normal appearance. She is well-developed. She is not toxic-appearing. HENT:      Head: Normocephalic and atraumatic. Right Ear: Hearing and tympanic membrane normal.      Left Ear: Hearing and tympanic membrane normal.      Nose: Nose normal. No nasal deformity. Eyes:      General: Lids are normal.         Right eye: No discharge. Left eye: No discharge. Conjunctiva/sclera: Conjunctivae normal.      Pupils: Pupils are equal, round, and reactive to light. Neck:      Thyroid: No thyroid mass or thyromegaly. Vascular: No JVD. Trachea: Trachea and phonation normal.   Cardiovascular:      Rate and Rhythm: Normal rate and regular rhythm. Pulmonary:      Effort: No accessory muscle usage or respiratory distress. Musculoskeletal:      Cervical back: Full passive range of motion without pain. Comments: FROM all large muscle groups and joints as witnessed when walking to exam table, getting on, and getting off the exam table. Skin:     General: Skin is warm and dry. Findings: No rash.       Comments: R buttock x 2 irregular raised lesions each 4 mm    With pt in supine position the inferior lesion is irregularly pigmented in addition to above notation   Neurological:      Mental Status: She is alert. Motor: No tremor or atrophy. Gait: Gait normal.   Psychiatric:         Speech: Speech normal.         Behavior: Behavior normal.         Thought Content: Thought content normal.         PROCEDURE: both lesions removed with the same techique but only the inferior lesion sent to pathology  Informed consent was given by the patient. Risks including infection, bleeding and scarring were discussed. No guarantee how the scar will look. Patient skin was prepped with alcohol. Wound was anesthetized using 1.0 cc of 1% lidocaine with epinephrine for anesthesia. A Dermablade was used to obtain the complete removal of the visible lesion. drysol was used at the base of site. Bacitracin ointment and bandage were placed on the wound. Estimated blood loss less than 1 cc    Post op wound care instructions were given to the patient. He/She will f/u in 2 weeks or sooner if needed for problems. Moncho Benson was seen today for skin exam.    Diagnoses and all orders for this visit:    Abnormal skin growth  -     Cancel: PA DESTRUC PREMALIGNANT, FIRST LESION  -     Cancel: PA DESTRUC PREMALIGNANT,2-14 LESIONS  -     PA SHAV SKIN LES <5MM REMAINDR BODY  -     Specimen to Pathology Outpatient; Future  -     [id number] - PA TANGENTIAL BIOPSY SKIN SINGLE LESION    Changing skin lesion  -     Cancel: PA DESTRUC PREMALIGNANT, FIRST LESION  -     Cancel: PA DESTRUC PREMALIGNANT,2-14 LESIONS  -     PA SHAV SKIN LES <5MM REMAINDR BODY  -     Specimen to Pathology Outpatient; Future  -     [id number] - PA TANGENTIAL BIOPSY SKIN SINGLE LESION    Cancer of the skin, 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 Patient Instructions   Incision/Laceration repair    -Clean surgical area with antibacterial soap and water once daily. --You may be instructed to soak the wound with Hydrogen Peroxide to loosen scabbing around sutures, this is not to be done more often that every 3 days, should be for 30 seconds-1 min and then rinsed off with water.     -Keep surgical site moist with vaseline or antibiotic ointment (single- Bacitracin, not triple antibiotic or Neosporin) and apply a fresh bandage daily until a solid scab forms or if the wound is at risk for trauma or dirt. It is important to leave the wound uncovered part of the day to allow the skin to dry and avoid breakdown from excessive moisture. There may be exceptions to this, the staff will provide information if your wound requires a variation in this, that will often involve a prescription ointment or cream.     -Follow up immediately if any growing redness (minimal redness or pale pink is normal along wound edges) surrounds the surgical site or if dripping drainage occurs at surgical site. Once a solid scab forms no more bandage needed. A wet scab can look yellow. This is not infection, just moisture.    -Once the lesion is healed be sure to apply sunscreen to the area to prevent burn of the newer and more delicate skin especially during the first 6 months of healing.        -If the scar begins to be raised you may massage the area firmly twice a day to help break down scar tissue and help the area become a flat scar. There is some evidence that Mederma applied to a scar daily for the first few months can help shrink and fade it more quickly then without intervention. Lee Bruce M.D. This note was partially created with the assistance of dictation. This may lead to grammatical or spelling errors.

## 2022-09-22 NOTE — PATIENT INSTRUCTIONS
Incision/Laceration repair    -Clean surgical area with antibacterial soap and water once daily. --You may be instructed to soak the wound with Hydrogen Peroxide to loosen scabbing around sutures, this is not to be done more often that every 3 days, should be for 30 seconds-1 min and then rinsed off with water.     -Keep surgical site moist with vaseline or antibiotic ointment (single- Bacitracin, not triple antibiotic or Neosporin) and apply a fresh bandage daily until a solid scab forms or if the wound is at risk for trauma or dirt. It is important to leave the wound uncovered part of the day to allow the skin to dry and avoid breakdown from excessive moisture. There may be exceptions to this, the staff will provide information if your wound requires a variation in this, that will often involve a prescription ointment or cream.     -Follow up immediately if any growing redness (minimal redness or pale pink is normal along wound edges) surrounds the surgical site or if dripping drainage occurs at surgical site. Once a solid scab forms no more bandage needed. A wet scab can look yellow. This is not infection, just moisture.    -Once the lesion is healed be sure to apply sunscreen to the area to prevent burn of the newer and more delicate skin especially during the first 6 months of healing.        -If the scar begins to be raised you may massage the area firmly twice a day to help break down scar tissue and help the area become a flat scar. There is some evidence that Mederma applied to a scar daily for the first few months can help shrink and fade it more quickly then without intervention.

## 2022-09-26 NOTE — RESULT ENCOUNTER NOTE
Notify patient the skin lesion pathology shows that the area is noncancerous. However, it is not normal skin tissue. Therefore it is good that it was removed. No further treatment is necessary at this time.

## 2022-10-07 ENCOUNTER — HOSPITAL ENCOUNTER (OUTPATIENT)
Dept: CT IMAGING | Age: 62
Discharge: HOME | End: 2022-10-09
Payer: COMMERCIAL

## 2022-10-07 DIAGNOSIS — Z72.0 TOBACCO ABUSE DISORDER: ICD-10-CM

## 2022-10-07 PROCEDURE — 71271 CT THORAX LUNG CANCER SCR C-: CPT

## 2022-10-13 DIAGNOSIS — Z12.39 BREAST SCREENING: Primary | ICD-10-CM

## 2022-11-02 ENCOUNTER — HOSPITAL ENCOUNTER (OUTPATIENT)
Dept: WOMENS IMAGING | Age: 62
Discharge: HOME | End: 2022-11-04
Payer: COMMERCIAL

## 2022-11-02 ENCOUNTER — APPOINTMENT (OUTPATIENT)
Dept: ULTRASOUND IMAGING | Age: 62
End: 2022-11-02
Payer: COMMERCIAL

## 2022-11-02 DIAGNOSIS — Z12.39 BREAST SCREENING: ICD-10-CM

## 2022-11-02 PROCEDURE — 77067 SCR MAMMO BI INCL CAD: CPT

## 2022-11-22 ENCOUNTER — OFFICE VISIT (OUTPATIENT)
Dept: FAMILY MEDICINE CLINIC | Age: 62
End: 2022-11-22
Payer: COMMERCIAL

## 2022-11-22 VITALS
OXYGEN SATURATION: 96 % | HEART RATE: 79 BPM | HEIGHT: 67 IN | WEIGHT: 133 LBS | BODY MASS INDEX: 20.88 KG/M2 | TEMPERATURE: 97.2 F | DIASTOLIC BLOOD PRESSURE: 72 MMHG | SYSTOLIC BLOOD PRESSURE: 122 MMHG

## 2022-11-22 DIAGNOSIS — R61 NIGHT SWEATS: ICD-10-CM

## 2022-11-22 DIAGNOSIS — M48.00 SPINAL STENOSIS, UNSPECIFIED SPINAL REGION: ICD-10-CM

## 2022-11-22 DIAGNOSIS — R07.9 CHEST PAIN, UNSPECIFIED TYPE: Primary | ICD-10-CM

## 2022-11-22 PROCEDURE — 93000 ELECTROCARDIOGRAM COMPLETE: CPT | Performed by: STUDENT IN AN ORGANIZED HEALTH CARE EDUCATION/TRAINING PROGRAM

## 2022-11-22 PROCEDURE — 99214 OFFICE O/P EST MOD 30 MIN: CPT | Performed by: STUDENT IN AN ORGANIZED HEALTH CARE EDUCATION/TRAINING PROGRAM

## 2022-11-22 RX ORDER — PAROXETINE 10 MG/1
10 TABLET, FILM COATED ORAL EVERY MORNING
Qty: 30 TABLET | Refills: 3 | Status: CANCELLED | OUTPATIENT
Start: 2022-11-22

## 2022-11-22 SDOH — ECONOMIC STABILITY: FOOD INSECURITY: WITHIN THE PAST 12 MONTHS, YOU WORRIED THAT YOUR FOOD WOULD RUN OUT BEFORE YOU GOT MONEY TO BUY MORE.: NEVER TRUE

## 2022-11-22 SDOH — ECONOMIC STABILITY: FOOD INSECURITY: WITHIN THE PAST 12 MONTHS, THE FOOD YOU BOUGHT JUST DIDN'T LAST AND YOU DIDN'T HAVE MONEY TO GET MORE.: NEVER TRUE

## 2022-11-22 ASSESSMENT — SOCIAL DETERMINANTS OF HEALTH (SDOH): HOW HARD IS IT FOR YOU TO PAY FOR THE VERY BASICS LIKE FOOD, HOUSING, MEDICAL CARE, AND HEATING?: NOT HARD AT ALL

## 2022-11-22 ASSESSMENT — ENCOUNTER SYMPTOMS
COUGH: 0
ABDOMINAL PAIN: 0
SORE THROAT: 0
VOMITING: 0
SINUS PRESSURE: 0
SHORTNESS OF BREATH: 0
BACK PAIN: 1

## 2022-11-22 NOTE — PROGRESS NOTES
2022    Yazan Porter (:  1960) is a 58 y.o. female, here for evaluation of the following medical concerns:  Chief Complaint   Patient presents with    Sweats     X2 months    Chest Pain     Pt state last Thursday she fell chest. None in pain right now     HPI  Chest pain  Has had about 3 episodes in the last 1 year  Last episode was 4 days ago  Central chest pain that radiated to her bilateral neck without arm involvement  Patient was sitting at the time watching TV  Episode lasted about 5 minutes and then resolved  She did endorse feeling anxious but denied lightheadedness, dizziness, diaphoresis, palpitations  She did have a stress test completed about 5 years ago which was normal  Patient's father had a heart attack in his 76s    Night sweats  Only at nighttime  Drenched in sweat  Occurring most nights  Started about 3-6 months   Had a complete hysterectomy at 27  Seen at [facility]  States she had blood work completed in her hormones were \"out of range\"  Had an hormone implant placed 2 weeks ago  Has not noticed a change in her night sweats yet  Goes back  for repeat labs    Back pain  Seen by neurosurgery through [facility]  Recommended surgery due to canal stenosis  Patient would like second opinion    Review of Systems   Constitutional:  Negative for chills and fever. HENT:  Negative for congestion, sinus pressure and sore throat. Respiratory:  Negative for cough and shortness of breath. Cardiovascular:  Positive for chest pain. Negative for palpitations. Gastrointestinal:  Negative for abdominal pain and vomiting. Musculoskeletal:  Positive for back pain. Negative for arthralgias and myalgias. Skin:  Negative for rash and wound. Neurological:  Negative for speech difficulty and light-headedness. Psychiatric/Behavioral:  Negative for suicidal ideas. The patient is not nervous/anxious.       Prior to Visit Medications    Not on File There are no discontinued medications.     No Known Allergies    Past Medical History:   Diagnosis Date    Cancer ([facility] [address]     Chronic back pain        Past Surgical History:   Procedure Laterality Date    BREAST ENHANCEMENT SURGERY Bilateral 2021     SECTION      COLONOSCOPY      COLONOSCOPY N/A 2021    COLORECTAL CANCER SCREENING with polypectomies performed by Herlinda Trimble MD at [address], TOTAL ABDOMINAL (CERVIX REMOVED)      LAPAROSCOPY      OVARY REMOVAL      SKIN CANCER EXCISION      basal cell skin cancer excised per patient       Social History     Socioeconomic History    Marital status:      Spouse name: Not on file    Number of children: Not on file    Years of education: Not on file    Highest education level: Not on file   Occupational History    Not on file   Tobacco Use    Smoking status: Every Day     Packs/day: 1.00     Years: 30.00     Pack years: 30.00     Types: Cigarettes    Smokeless tobacco: Never   Vaping Use    Vaping Use: Never used   Substance and Sexual Activity    Alcohol use: Yes    Drug use: Never    Sexual activity: Not on file   Other Topics Concern    Not on file   Social History Narrative    Not on file     Social Determinants of Health     Financial Resource Strain: Low Risk     Difficulty of Paying Living Expenses: Not hard at all   Food Insecurity: No Food Insecurity    Worried About Running Out of Food in the Last Year: Never true    Ran Out of Food in the Last Year: Never true   Transportation Needs: Not on file   Physical Activity: Not on file   Stress: Not on file   Social Connections: Not on file   Intimate Partner Violence: Not on file   Housing Stability: Not on file        Family History   Problem Relation Age of Onset    Heart Attack Father     Stroke Father     Prostate Cancer Brother     Cancer Maternal Grandmother     Stroke Maternal Grandmother     Cancer Maternal Grandfather     Diabetes Maternal Grandfather Kidney Disease Neg Hx        Vitals:    11/22/22 1038   BP: 122/72   Pulse: 79   Temp: 97.2 °F (36.2 °C)   SpO2: 96%   Weight: 133 lb (60.3 kg)   Height: 5' 7\" (1.702 m)       Estimated body mass index is 20.83 kg/m² as calculated from the following:    Height as of this encounter: 5' 7\" (1.702 m). Weight as of this encounter: 133 lb (60.3 kg). No results for input(s): WBC, RBC, HGB, HCT, MCV, MCH, MCHC, RDW, PLT, MPV in the last 72 hours. No results for input(s): NA, K, CL, CO2, BUN, CREATININE, GLUCOSE, CALCIUM, PROT, LABALBU, BILITOT, ALKPHOS, AST, ALT in the last 72 hours. No results found for: LABA1C    SUNIL SUSHIL DIGITAL SCREEN BILATERAL    Result Date: 11/2/2022  No mammographic evidence for malignancy. BIRADS: BIRADS - CATEGORY 1 Negative, no evidence of malignancy. Normal interval follow-up is recommended in 12 months. OVERALL ASSESSMENT - NEGATIVE A letter of notification will be sent to the patient regarding the results. The [employer] of [employer] recommends annual mammograms for women 40 years and older. Physical Exam  Constitutional:       General: She is not in acute distress. Appearance: Normal appearance. HENT:      Head: Normocephalic and atraumatic. Eyes:      Extraocular Movements: Extraocular movements intact. Conjunctiva/sclera: Conjunctivae normal.   Musculoskeletal:         General: No deformity. Normal range of motion. Skin:     Findings: No lesion or rash. Neurological:      General: No focal deficit present. Mental Status: She is alert. Mental status is at baseline. Psychiatric:         Mood and Affect: Mood normal.         Behavior: Behavior normal.         Thought Content: Thought content normal.       ASSESSMENT/PLAN:  1. Chest pain, unspecified type  EKG: normal EKG, normal sinus rhythm, there are no previous tracings available for comparison.    Would recommend stress test and f/u cardiology    - CARDIAC STRESS TEST EXERCISE ONLY; Future  - [address] [facility], [place] DO [place], Interventional Cardiology, [place]  - EKG 12 lead    2. Spinal stenosis, unspecified spinal region  Referral created for second opinion    - Ash Aguilar MD, Neurosurgery, [place]    3. Night sweats  Will see if hormonal implant helps her night sweats    There are no discontinued medications.    ---------------------------------------------------------------------  Side effects, adverse effects of the medication prescribed today, as well as treatment plan/ rationale and result expectations have been discussed with the patient who expresses understanding and desires to proceed. Close follow up to evaluate treatment results and for coordination of care. I have reviewed the patient's medical history in detail and updated the computerized patient record. As always, patient is advised that if symptoms worsen in any way they will proceed to the nearest emergency room. --------------------------------------------------------------------    Return in about 4 weeks (around 12/20/2022) for f/u night sweats. An  electronic signature was used to authenticate this note.     --Terell Bangura DO on 11/22/2022 at 11:16 AM

## 2022-11-30 ENCOUNTER — TELEPHONE (OUTPATIENT)
Dept: FAMILY MEDICINE CLINIC | Age: 62
End: 2022-11-30

## 2022-11-30 DIAGNOSIS — Z20.828 EXPOSURE TO INFLUENZA: Primary | ICD-10-CM

## 2022-11-30 RX ORDER — OSELTAMIVIR PHOSPHATE 75 MG/1
75 CAPSULE ORAL 2 TIMES DAILY
Qty: 10 CAPSULE | Refills: 0 | Status: SHIPPED | OUTPATIENT
Start: 2022-11-30 | End: 2022-11-30 | Stop reason: SDUPTHER

## 2022-11-30 RX ORDER — OSELTAMIVIR PHOSPHATE 75 MG/1
75 CAPSULE ORAL DAILY
Qty: 10 CAPSULE | Refills: 0 | Status: SHIPPED | OUTPATIENT
Start: 2022-11-30 | End: 2022-12-10

## 2022-12-05 ENCOUNTER — HOSPITAL ENCOUNTER (OUTPATIENT)
Dept: NON INVASIVE DIAGNOSTICS | Age: 62
Discharge: HOME | End: 2022-12-05
Payer: COMMERCIAL

## 2022-12-05 DIAGNOSIS — R07.9 CHEST PAIN, UNSPECIFIED TYPE: ICD-10-CM

## 2022-12-05 PROCEDURE — 93017 CV STRESS TEST TRACING ONLY: CPT

## 2022-12-05 NOTE — PROGRESS NOTES
Hx,allergies and medications reviewed. Procedure explained and informed consent obtained. Tolerated treadmill well for 9:15 minutes. Reached 92 % target HR. SOB noted. Returned to baseline in recovery. Denies chest pain or pressure. EKG shows no noted ectopy. Doctor to further review and interpret results.

## 2022-12-06 NOTE — PROCEDURES
[address]                      [address], [address] [facility]                              CARDIAC STRESS TEST    PATIENT NAME: Tiffanie Bello                   :        1960  MED REC NO:   [id number]                            ROOM:  ACCOUNT NO:   [de-identified]                           ADMIT DATE: 2022  PROVIDER:     Nancy Godinez DO    CARDIOVASCULAR DIAGNOSTIC DEPARTMENT    DATE OF STUDY:  2022    STRESS TEST REPORT    REFERRING PROVIDER:  Carter Olvera DO    STUDY PERFORMED:  Graded EKG exercise stress test.    INDICATIONS:  Chest pain. TECHNIQUE:  The patient was maintained on continuous EKG as well as  intermittent blood pressure recordings. She was stressed on a treadmill  according to standard Jamie protocol. Upon achievement of the target  heart rate, the maximum exercise, stress EKG was analyzed for any  ischemic changes. FINDINGS:  The baseline EKG shows normal sinus rhythm with heart rate of  77 beats per minute. Resting blood pressure 116/71 mmHg. Nonspecific  T-wave changes noted at rest.    The patient was able to exercise for a 9 minutes and 15 seconds. The  maximum heart rate achieved was 146 beats per minute. This represents  92% of age-predicted maximum heart rate. Maximum blood pressure 165/93  mmHg, maximum workload was 10.9 METs. At peak exercise, the stress EKG is negative for any inducible ischemia  by EKG criteria. There were no arrhythmias. No chest pain. Heart rate  response to activity is noted. Heart rate recovery is normal.  Exercise  capacity is good. CONCLUSIONS:  Study is negative for ischemia by EKG criteria at peak  heart rate and good workload.         Subha Alberto DO    D: 2022 #16:20:13       T: 2022 17:10:38     [email or web address]  Job#: [id number]     Doc#: [id number]    CC:  Carter Olvera DO

## 2022-12-07 PROCEDURE — 93018 CV STRESS TEST I&R ONLY: CPT | Performed by: INTERNAL MEDICINE

## 2023-02-23 LAB
ALANINE AMINOTRANSFERASE (SGPT) (U/L) IN SER/PLAS: 15 U/L (ref 7–45)
ALBUMIN (G/DL) IN SER/PLAS: 4.6 G/DL (ref 3.4–5)
ALKALINE PHOSPHATASE (U/L) IN SER/PLAS: 51 U/L (ref 33–136)
ANION GAP IN SER/PLAS: 10 MMOL/L (ref 10–20)
ASPARTATE AMINOTRANSFERASE (SGOT) (U/L) IN SER/PLAS: 17 U/L (ref 9–39)
BASOPHILS (10*3/UL) IN BLOOD BY AUTOMATED COUNT: 0.07 X10E9/L (ref 0–0.1)
BASOPHILS/100 LEUKOCYTES IN BLOOD BY AUTOMATED COUNT: 1 % (ref 0–2)
BILIRUBIN TOTAL (MG/DL) IN SER/PLAS: 0.6 MG/DL (ref 0–1.2)
CALCIDIOL (25 OH VITAMIN D3) (NG/ML) IN SER/PLAS: 59 NG/ML
CALCIUM (MG/DL) IN SER/PLAS: 10.2 MG/DL (ref 8.6–10.3)
CARBON DIOXIDE, TOTAL (MMOL/L) IN SER/PLAS: 28 MMOL/L (ref 21–32)
CHLORIDE (MMOL/L) IN SER/PLAS: 103 MMOL/L (ref 98–107)
CHOLESTEROL (MG/DL) IN SER/PLAS: 186 MG/DL (ref 0–199)
CHOLESTEROL IN HDL (MG/DL) IN SER/PLAS: 68.2 MG/DL
CHOLESTEROL/HDL RATIO: 2.7
CREATININE (MG/DL) IN SER/PLAS: 0.83 MG/DL (ref 0.5–1.05)
EOSINOPHILS (10*3/UL) IN BLOOD BY AUTOMATED COUNT: 0.07 X10E9/L (ref 0–0.7)
EOSINOPHILS/100 LEUKOCYTES IN BLOOD BY AUTOMATED COUNT: 1 % (ref 0–6)
ERYTHROCYTE DISTRIBUTION WIDTH (RATIO) BY AUTOMATED COUNT: 13.3 % (ref 11.5–14.5)
ERYTHROCYTE MEAN CORPUSCULAR HEMOGLOBIN CONCENTRATION (G/DL) BY AUTOMATED: 34.4 G/DL (ref 32–36)
ERYTHROCYTE MEAN CORPUSCULAR VOLUME (FL) BY AUTOMATED COUNT: 94 FL (ref 80–100)
ERYTHROCYTES (10*6/UL) IN BLOOD BY AUTOMATED COUNT: 4.45 X10E12/L (ref 4–5.2)
GFR FEMALE: 79 ML/MIN/1.73M2
GLUCOSE (MG/DL) IN SER/PLAS: 96 MG/DL (ref 74–99)
HEMATOCRIT (%) IN BLOOD BY AUTOMATED COUNT: 41.9 % (ref 36–46)
HEMOGLOBIN (G/DL) IN BLOOD: 14.4 G/DL (ref 12–16)
IMMATURE GRANULOCYTES/100 LEUKOCYTES IN BLOOD BY AUTOMATED COUNT: 0.4 % (ref 0–0.9)
LDL: 102 MG/DL (ref 0–99)
LEUKOCYTES (10*3/UL) IN BLOOD BY AUTOMATED COUNT: 7.1 X10E9/L (ref 4.4–11.3)
LYMPHOCYTES (10*3/UL) IN BLOOD BY AUTOMATED COUNT: 2.41 X10E9/L (ref 1.2–4.8)
LYMPHOCYTES/100 LEUKOCYTES IN BLOOD BY AUTOMATED COUNT: 34 % (ref 13–44)
MONOCYTES (10*3/UL) IN BLOOD BY AUTOMATED COUNT: 0.46 X10E9/L (ref 0.1–1)
MONOCYTES/100 LEUKOCYTES IN BLOOD BY AUTOMATED COUNT: 6.5 % (ref 2–10)
NEUTROPHILS (10*3/UL) IN BLOOD BY AUTOMATED COUNT: 4.05 X10E9/L (ref 1.2–7.7)
NEUTROPHILS/100 LEUKOCYTES IN BLOOD BY AUTOMATED COUNT: 57.1 % (ref 40–80)
PLATELETS (10*3/UL) IN BLOOD AUTOMATED COUNT: 346 X10E9/L (ref 150–450)
POTASSIUM (MMOL/L) IN SER/PLAS: 4.1 MMOL/L (ref 3.5–5.3)
PROTEIN TOTAL: 7.3 G/DL (ref 6.4–8.2)
SEDIMENTATION RATE, ERYTHROCYTE: 14 MM/H (ref 0–30)
SODIUM (MMOL/L) IN SER/PLAS: 137 MMOL/L (ref 136–145)
THYROTROPIN (MIU/L) IN SER/PLAS BY DETECTION LIMIT <= 0.05 MIU/L: 0.93 MIU/L (ref 0.44–3.98)
THYROXINE (T4) FREE (NG/DL) IN SER/PLAS: 0.85 NG/DL (ref 0.61–1.12)
TRIGLYCERIDE (MG/DL) IN SER/PLAS: 78 MG/DL (ref 0–149)
URATE (MG/DL) IN SER/PLAS: 3.7 MG/DL (ref 2.3–6.7)
UREA NITROGEN (MG/DL) IN SER/PLAS: 16 MG/DL (ref 6–23)
VLDL: 16 MG/DL (ref 0–40)

## 2023-02-26 PROBLEM — J34.89 RHINORRHEA: Status: ACTIVE | Noted: 2023-02-26

## 2023-02-26 PROBLEM — B35.4 TINEA CORPORIS: Status: ACTIVE | Noted: 2023-02-26

## 2023-02-26 PROBLEM — R19.7 DIARRHEA: Status: ACTIVE | Noted: 2023-02-26

## 2023-02-26 PROBLEM — R61 NIGHT SWEATS: Status: ACTIVE | Noted: 2023-02-26

## 2023-02-26 PROBLEM — K58.9 IBS (IRRITABLE BOWEL SYNDROME): Status: ACTIVE | Noted: 2023-02-26

## 2023-02-26 PROBLEM — I83.90 VARICOSE VEIN OF LEG: Status: ACTIVE | Noted: 2023-02-26

## 2023-02-26 PROBLEM — M19.90 OSTEOARTHRITIS: Status: ACTIVE | Noted: 2023-02-26

## 2023-02-26 PROBLEM — M54.2 NECK PAIN: Status: ACTIVE | Noted: 2023-02-26

## 2023-02-26 PROBLEM — J02.9 SORE THROAT: Status: ACTIVE | Noted: 2023-02-26

## 2023-02-26 PROBLEM — I05.9 MITRAL VALVE PROBLEM: Status: ACTIVE | Noted: 2023-02-26

## 2023-02-26 PROBLEM — S20.211S RIB CONTUSION, RIGHT, SEQUELA: Status: ACTIVE | Noted: 2023-02-26

## 2023-02-26 PROBLEM — Z04.9 CONDITION NOT FOUND: Status: ACTIVE | Noted: 2023-02-26

## 2023-02-26 PROBLEM — R50.9 FEVER: Status: ACTIVE | Noted: 2023-02-26

## 2023-02-26 PROBLEM — J40 BRONCHITIS: Status: ACTIVE | Noted: 2023-02-26

## 2023-02-26 PROBLEM — R10.2 PELVIC PAIN: Status: ACTIVE | Noted: 2023-02-26

## 2023-02-26 PROBLEM — F17.200 NICOTINE DEPENDENCE: Status: ACTIVE | Noted: 2023-02-26

## 2023-02-26 PROBLEM — J30.9 ALLERGIC RHINITIS: Status: ACTIVE | Noted: 2023-02-26

## 2023-02-26 PROBLEM — K59.00 CONSTIPATION: Status: ACTIVE | Noted: 2023-02-26

## 2023-02-26 PROBLEM — Z91.199 NONCOMPLIANCE: Status: ACTIVE | Noted: 2023-02-26

## 2023-02-26 PROBLEM — M25.512 SHOULDER PAIN, LEFT: Status: ACTIVE | Noted: 2023-02-26

## 2023-02-26 PROBLEM — R10.9 ABDOMINAL PAIN: Status: ACTIVE | Noted: 2023-02-26

## 2023-02-26 PROBLEM — F17.200 SMOKER: Status: ACTIVE | Noted: 2023-02-26

## 2023-02-26 PROBLEM — M81.0 OSTEOPOROSIS: Status: ACTIVE | Noted: 2023-02-26

## 2023-02-26 PROBLEM — S16.1XXA CERVICAL STRAIN: Status: ACTIVE | Noted: 2023-02-26

## 2023-02-26 PROBLEM — R53.1 WEAKNESS: Status: ACTIVE | Noted: 2023-02-26

## 2023-02-26 PROBLEM — R44.8 FACIAL PRESSURE: Status: ACTIVE | Noted: 2023-02-26

## 2023-02-26 PROBLEM — B02.9 HERPES ZOSTER: Status: ACTIVE | Noted: 2023-02-26

## 2023-02-26 PROBLEM — R42 VERTIGO: Status: ACTIVE | Noted: 2023-02-26

## 2023-02-26 PROBLEM — R05.9 COUGH: Status: ACTIVE | Noted: 2023-02-26

## 2023-02-26 PROBLEM — R93.89 ABNORMAL CHEST XRAY: Status: ACTIVE | Noted: 2023-02-26

## 2023-02-26 PROBLEM — E55.9 VITAMIN D DEFICIENCY: Status: ACTIVE | Noted: 2023-02-26

## 2023-02-26 PROBLEM — R51.9 HEADACHE: Status: ACTIVE | Noted: 2023-02-26

## 2023-02-26 PROBLEM — R09.81 NASAL CONGESTION: Status: ACTIVE | Noted: 2023-02-26

## 2023-02-26 PROBLEM — K59.09 CHRONIC CONSTIPATION: Status: ACTIVE | Noted: 2023-02-26

## 2023-02-26 PROBLEM — L43.8 ORAL LICHEN PLANUS: Status: ACTIVE | Noted: 2023-02-26

## 2023-02-26 PROBLEM — B36.8 TINEA INCOGNITO: Status: ACTIVE | Noted: 2023-02-26

## 2023-02-26 PROBLEM — B27.90 MONONUCLEOSIS SYNDROME: Status: ACTIVE | Noted: 2023-02-26

## 2023-02-26 PROBLEM — J34.3 HYPERTROPHY OF BOTH INFERIOR NASAL TURBINATES: Status: ACTIVE | Noted: 2023-02-26

## 2023-02-26 RX ORDER — SERTRALINE HYDROCHLORIDE 50 MG/1
50 TABLET, FILM COATED ORAL NIGHTLY
COMMUNITY
End: 2023-08-01 | Stop reason: SDUPTHER

## 2023-03-21 ENCOUNTER — TELEMEDICINE (OUTPATIENT)
Dept: PRIMARY CARE | Facility: CLINIC | Age: 63
End: 2023-03-21
Payer: COMMERCIAL

## 2023-03-21 DIAGNOSIS — F41.9 ANXIETY: Primary | ICD-10-CM

## 2023-03-21 PROBLEM — K59.09 CHRONIC CONSTIPATION: Status: RESOLVED | Noted: 2023-02-26 | Resolved: 2023-03-21

## 2023-03-21 PROBLEM — M54.50 ACUTE BILATERAL LOW BACK PAIN: Status: ACTIVE | Noted: 2023-03-14

## 2023-03-21 PROBLEM — K59.00 CONSTIPATION: Status: RESOLVED | Noted: 2023-02-26 | Resolved: 2023-03-21

## 2023-03-21 PROBLEM — B02.9 HERPES ZOSTER: Status: RESOLVED | Noted: 2023-02-26 | Resolved: 2023-03-21

## 2023-03-21 PROBLEM — M43.16 SPONDYLOLISTHESIS, LUMBAR REGION: Status: ACTIVE | Noted: 2023-03-21

## 2023-03-21 PROBLEM — R19.7 DIARRHEA: Status: RESOLVED | Noted: 2023-02-26 | Resolved: 2023-03-21

## 2023-03-21 PROBLEM — I05.9 MITRAL VALVE PROBLEM: Status: RESOLVED | Noted: 2023-02-26 | Resolved: 2023-03-21

## 2023-03-21 PROBLEM — K58.9 IBS (IRRITABLE BOWEL SYNDROME): Status: RESOLVED | Noted: 2023-02-26 | Resolved: 2023-03-21

## 2023-03-21 PROCEDURE — 99213 OFFICE O/P EST LOW 20 MIN: CPT | Performed by: FAMILY MEDICINE

## 2023-03-21 RX ORDER — METAXALONE 800 MG
800 TABLET ORAL 3 TIMES DAILY
COMMUNITY
Start: 2023-03-14 | End: 2023-05-04 | Stop reason: ALTCHOICE

## 2023-03-21 RX ORDER — OXYCODONE AND ACETAMINOPHEN 10; 325 MG/1; MG/1
1 TABLET ORAL EVERY 6 HOURS PRN
COMMUNITY
Start: 2023-03-14 | End: 2023-03-26

## 2023-03-22 NOTE — ASSESSMENT & PLAN NOTE
Glad to hear you are symptoms are diminished with use of sertraline.  Lets continue medication at current dosage.  If you run out of meds or needs refills please call back.  Use ice as method of pain relief for your back surgery which sounds like pain is being controlled however placing Carrie in a freezer bag and placing it in the freezer will provide easy access to a moldable called pain relieving treatment which will enable you to get by with less medications with frequent use    3/30/23Maria Esther  Received: Today  BRAEDEN Palacios DO  Pt saw you 2/21 in office and 3/21 virtual. She states you are aware of her recent back surgery as well as her Divorce. She is having really hard time right now and wondering if you can send a few pills in for her nerves. She states she just wants to calm down and is not able to do so at this time d/t multiple conditions and she's still healing  Please Advise   we will send in prescription for BuSpar and have her try this half twice daily and see back in 3 to 4 weeks.

## 2023-03-22 NOTE — PROGRESS NOTES
Subjective   Patient ID: Melani Mendieta is a 62 y.o. female who presents for No chief complaint on file..    Okay patient here for follow-up from 2/21/2023.  At that visit she discussed going through a lot of mental stress with recent divorce and brother being diagnosed with leukemia.  She is happy because recently she would determine that she is a able mobile daughter home meds perfectly.  He is taking the medication and seems to be much improved over the last visit.  We had discussed going to psychologist for CBT but recently had back surgery and this has not happened as of yet.  She is happy with the improvement in her mood and wants to continue the medication she denies any side effects including increasing diarrhea or agitation.  Previous visit she had described excessive worry with insomnia muscle tension and nervousness but she states that a lot of the symptoms have resolved with medication.         Review of Systems  cardiovascular:  no  palpitations or chest  pain  respiratory: no  shortness  of  breath  endocrine:  no polydipsia,  no polyuria  musculoskeletal:  no  myalgia.. yes  arthralgia     Objective   There were no vitals taken for this visit.    Physical Exam  Physical examination the visual auditory observations  Vital signs none provided by patient  General no acute distress alert and oriented  Head and neck no obvious mass or deformity appreciated no obvious xanthelasma  Lungs no obvious respiratory distress. No audible wheezes noted  Abdomen.  No .Obesity appreciated  Extremities no visual  abnormalties appreciated noted  Neuro. No visually apparent deficits  Psychiatric. Well with normal mood     Assessment/Plan   Problem List Items Addressed This Visit          Other    Anxiety - Primary     Glad to hear you are symptoms are diminished with use of sertraline.  Lets continue medication at current dosage.  If you run out of meds or needs refills please call back.  Use ice as method of pain  relief for your back surgery which sounds like pain is being controlled however placing Carrie in a freezer bag and placing it in the freezer will provide easy access to a moldable called pain relieving treatment which will enable you to get by with less medications with frequent use

## 2023-03-30 RX ORDER — BUSPIRONE HYDROCHLORIDE 5 MG/1
5 TABLET ORAL 2 TIMES DAILY
Qty: 60 TABLET | Refills: 0 | Status: SHIPPED | OUTPATIENT
Start: 2023-03-30 | End: 2023-05-04 | Stop reason: SDUPTHER

## 2023-05-04 ENCOUNTER — OFFICE VISIT (OUTPATIENT)
Dept: PRIMARY CARE | Facility: CLINIC | Age: 63
End: 2023-05-04
Payer: COMMERCIAL

## 2023-05-04 VITALS
OXYGEN SATURATION: 98 % | SYSTOLIC BLOOD PRESSURE: 122 MMHG | RESPIRATION RATE: 18 BRPM | HEIGHT: 67 IN | TEMPERATURE: 97 F | BODY MASS INDEX: 20.17 KG/M2 | HEART RATE: 86 BPM | WEIGHT: 128.5 LBS | DIASTOLIC BLOOD PRESSURE: 72 MMHG

## 2023-05-04 DIAGNOSIS — F41.9 ANXIETY: Primary | ICD-10-CM

## 2023-05-04 PROBLEM — M25.512 PAIN OF BOTH SHOULDER JOINTS: Status: ACTIVE | Noted: 2021-02-03

## 2023-05-04 PROBLEM — K63.5 POLYP OF COLON: Status: ACTIVE | Noted: 2023-05-04

## 2023-05-04 PROBLEM — R30.0 DYSURIA: Status: ACTIVE | Noted: 2021-07-19

## 2023-05-04 PROBLEM — M25.511 PAIN OF BOTH SHOULDER JOINTS: Status: ACTIVE | Noted: 2021-02-03

## 2023-05-04 PROBLEM — C44.91 CANCER OF THE SKIN, BASAL CELL: Status: ACTIVE | Noted: 2021-09-09

## 2023-05-04 PROCEDURE — 99213 OFFICE O/P EST LOW 20 MIN: CPT | Performed by: FAMILY MEDICINE

## 2023-05-04 RX ORDER — ALPRAZOLAM 0.5 MG/1
0.5 TABLET ORAL 3 TIMES DAILY PRN
Qty: 21 TABLET | Refills: 0 | Status: SHIPPED | OUTPATIENT
Start: 2023-05-04 | End: 2023-10-31 | Stop reason: ALTCHOICE

## 2023-05-04 RX ORDER — BUSPIRONE HYDROCHLORIDE 10 MG/1
10 TABLET ORAL 2 TIMES DAILY
Qty: 60 TABLET | Refills: 3 | Status: SHIPPED | OUTPATIENT
Start: 2023-05-04 | End: 2023-06-03

## 2023-05-04 RX ORDER — TRAMADOL HYDROCHLORIDE 50 MG/1
TABLET ORAL
COMMUNITY
Start: 2023-04-18 | End: 2023-05-04 | Stop reason: ALTCHOICE

## 2023-05-04 RX ORDER — DICLOFENAC SODIUM 25 MG/1
1 TABLET, DELAYED RELEASE ORAL 2 TIMES DAILY PRN
COMMUNITY
Start: 2022-05-12 | End: 2023-05-04 | Stop reason: ALTCHOICE
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
## 2023-05-04 NOTE — ASSESSMENT & PLAN NOTE
I believe you have put PTSD.  Increase BuSpar to 10 mg twice daily.  In addition we will give you a prescription for Xanax 0.5 mg 1 3 times daily.  Only take these during the emergency crises situations.  Like to see her back in 3 months.  My hope is that you only take these pills if you suspect that you might have difficulty controlling her emotions and continue to you do your therapy as well.  Follow-up 3 months
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
## 2023-08-01 ENCOUNTER — TELEPHONE (OUTPATIENT)
Dept: PRIMARY CARE | Facility: CLINIC | Age: 63
End: 2023-08-01

## 2023-08-01 ENCOUNTER — OFFICE VISIT (OUTPATIENT)
Dept: PRIMARY CARE | Facility: CLINIC | Age: 63
End: 2023-08-01
Payer: COMMERCIAL

## 2023-08-01 VITALS
OXYGEN SATURATION: 98 % | TEMPERATURE: 97 F | HEIGHT: 67 IN | DIASTOLIC BLOOD PRESSURE: 72 MMHG | WEIGHT: 124.8 LBS | HEART RATE: 77 BPM | BODY MASS INDEX: 19.59 KG/M2 | SYSTOLIC BLOOD PRESSURE: 130 MMHG | RESPIRATION RATE: 18 BRPM

## 2023-08-01 DIAGNOSIS — E78.5 HYPERLIPIDEMIA, UNSPECIFIED HYPERLIPIDEMIA TYPE: Primary | ICD-10-CM

## 2023-08-01 DIAGNOSIS — M81.0 OSTEOPOROSIS, UNSPECIFIED OSTEOPOROSIS TYPE, UNSPECIFIED PATHOLOGICAL FRACTURE PRESENCE: ICD-10-CM

## 2023-08-01 DIAGNOSIS — Z12.31 BREAST CANCER SCREENING BY MAMMOGRAM: ICD-10-CM

## 2023-08-01 DIAGNOSIS — F41.0 PANIC ANXIETY SYNDROME: ICD-10-CM

## 2023-08-01 DIAGNOSIS — Z87.891 PERSONAL HISTORY OF NICOTINE DEPENDENCE: ICD-10-CM

## 2023-08-01 PROBLEM — J01.90 ACUTE SINUSITIS: Status: ACTIVE | Noted: 2023-08-01

## 2023-08-01 PROBLEM — G89.18 POST-OPERATIVE PAIN: Status: ACTIVE | Noted: 2023-08-01

## 2023-08-01 PROBLEM — M79.671 RIGHT FOOT PAIN: Status: ACTIVE | Noted: 2023-08-01

## 2023-08-01 PROBLEM — M21.619 HALLUX ABDUCTOVALGUS WITH BUNIONS: Status: ACTIVE | Noted: 2023-08-01

## 2023-08-01 PROBLEM — M20.10 HALLUX ABDUCTOVALGUS WITH BUNIONS: Status: ACTIVE | Noted: 2023-08-01

## 2023-08-01 PROBLEM — M25.69 BACK STIFFNESS: Status: ACTIVE | Noted: 2023-08-01

## 2023-08-01 PROBLEM — N39.0 ACUTE UTI (URINARY TRACT INFECTION): Status: ACTIVE | Noted: 2023-08-01

## 2023-08-01 PROCEDURE — 99214 OFFICE O/P EST MOD 30 MIN: CPT | Performed by: FAMILY MEDICINE

## 2023-08-01 RX ORDER — VARENICLINE TARTRATE 1 MG/1
1 TABLET, FILM COATED ORAL 2 TIMES DAILY
Qty: 60 TABLET | Refills: 2 | Status: SHIPPED | OUTPATIENT
Start: 2023-08-01 | End: 2024-01-17 | Stop reason: ALTCHOICE

## 2023-08-01 RX ORDER — VARENICLINE TARTRATE 0.5 (11)-1
0.5 KIT ORAL 2 TIMES DAILY
Qty: 42 EACH | Refills: 1 | Status: SHIPPED | OUTPATIENT
Start: 2023-08-01 | End: 2024-01-17 | Stop reason: ALTCHOICE

## 2023-08-01 RX ORDER — TRAMADOL HYDROCHLORIDE 50 MG/1
TABLET ORAL
COMMUNITY

## 2023-08-01 RX ORDER — SERTRALINE HYDROCHLORIDE 50 MG/1
50 TABLET, FILM COATED ORAL NIGHTLY
Qty: 90 TABLET | Refills: 2 | Status: SHIPPED | OUTPATIENT
Start: 2023-08-01 | End: 2024-01-17 | Stop reason: ALTCHOICE

## 2023-08-01 RX ORDER — ALENDRONATE SODIUM 70 MG/1
70 TABLET ORAL
Qty: 12 TABLET | Refills: 3 | Status: SHIPPED | OUTPATIENT
Start: 2023-08-01 | End: 2024-01-17 | Stop reason: ALTCHOICE

## 2023-08-01 NOTE — PROGRESS NOTES
"Subjective   Patient ID: Melani Mendieta is a 63 y.o. female who presents for Follow-up.    HPI   here  for f/u  of depression .... DENEIS All of the following:  little  interest in doing things,  feeling  down or depressed  or  hopeless, trouble fallling asleep or staying asleep,  or sleeping too  much,  feeling tired  or having  little  energy,  poor appetite,  overeating,  or  considerable weigt changes,  feeling bad about yourself -  that you are a failure  or  having a lot  of  guilt, difficulty  concentrating  on things or  making decisions, moving  or speaking slowly ,  so that  other people  noticed,  thoughts that you would  be  better  off  dead or  hurting yourself  in some way   WANTS  TO  QUIT  SMOKING   Review of Systems  cardiovascular:  no  palpitations or chest  pain  respiratory: no  shortness  of  breath  endocrine:  no polydipsia,  no polyuria  musculoskeletal:  YES   myalgia.. no arthralgia  All other  systems discussed  negative   Objective   /72   Pulse 77   Temp 36.1 °C (97 °F)   Resp 18   Ht 1.702 m (5' 7\")   Wt 56.6 kg (124 lb 12.8 oz)   SpO2 98%   BMI 19.55 kg/m²        Physical Exam  general: alert oriented x three  HEENT hearing normal to voice  Neck supple  Lungs respirations non-labored.  Cardiovascular: no peripheral edema  Skin: warm and dry without rash  Psych: judgement and insight normal  Musculoskeletal:  ambulation normal,    lymph:negative cervical  LYMPADENOPATHY  thyroid: non palpable enlargement   CT  10/7/22    EMPHYSEMA  GET REPEAT  GET DEXA  Assessment/Plan   Problem List Items Addressed This Visit       Osteoporosis     DISCUSSED  MEDS...  TRY    CALCIUM  CITRATE / VITAMIN  D  .. EXERCISE    WEIGHT  AND STRENGTHENING    SEE WRAP UP          Relevant Medications    alendronate (Fosamax) 70 mg tablet    Other Relevant Orders    XR DEXA bone density    Hyperlipidemia - Primary    Panic anxiety syndrome     DEPRESSION  / PANIC  AND  stable and continue meds    "       Relevant Medications    sertraline (Zoloft) 50 mg tablet     Other Visit Diagnoses       Personal history of nicotine dependence        Relevant Medications    varenicline (Chantix Starting Month Box) 0.5 mg (11)- 1 mg (42) tablet    varenicline (Chantix Continuing Month Box) 1 mg tablet    Other Relevant Orders    CT lung screening low dose    Breast cancer screening by mammogram        Relevant Orders    BI mammo bilateral screening tomosynthesis          Review of lab testing shows uric acid 3 .7 vitamin D  59 Free T40.85 Sh 0.920 dated 2/3/2023    CMP dated 4/23 electrolytes calcium slightly increased 10.5    CBC with differential with normal hemoglobin and hematocrit WBC 10.6 platelets 358 CT of the lung dated 10/7/2022 showed no pulmonary nodules hyperlucent consistent with chronic emphysema status post bilateral breast implant    Ultrasound thyroid dated 5/20 showed hypoechoic cystic nodule on the mid posterior right thyroid 2.4 x 0.3 x 0.3 unchanged 0.4 cm nodule left thyroid no suspicious nodules

## 2023-08-01 NOTE — TELEPHONE ENCOUNTER
Pharmacist called stating the Chantix directions for stArting pack (comes 54 tablets) .5 x 1 week then 1mg x 3weeks   Please resend with clarification or give me the ok and I will call [facility]

## 2023-08-01 NOTE — PATIENT INSTRUCTIONS
Please consider exercise program involving walking or some other form of aerobic activity 5 days weekly for 30 minutes... Let's also consider strengthening of large muscle groups like the abdominal muscles or shoulder muscles... Twice weekly with reps of 5/10/15 exercises and gradually increase strength.. This is not heavy strength training but light weight training... Sit ups or back exercise routine.. Please ask for handout if uncertain how to do..This  will help to strengthen your muscles which in turn will help you to lose weight.... You might ask what is the best diet available.. I would strongly encourage you to consider  Weight Watchers.. And as  your  fellow on  Weight Watchers physician attempting to  live this  LIFE  style  choice with you....  I will be glad to give you recommendations on what to eat.. Consider buying Sandra bread.., luis alberto bagle thin bread.. oikos yogurt... eggs  to eat as hard-boiled... Halo top ice cream for snack... All these are delicious foods which.. when eaten and  being compliant eating three  meals daily  breakfast lunch and dinner, drinking  64 ounces of water daily we will all win together !!!!!!!. This will be a means for you to lose weight... Consider also the smart phone paola ... My plate.. Or My  fitness  pal..,  as additional possibilities for weight loss... Good  luck  Dr. GALILEO Castle!    You can continue on the additional vitamin D supplement of 5000 international units until the end of May of this calendar year .  Starting June.. Please continue with the vitamin D with your multivitamin and calcium supplement, this will be from June to October of ths calendar year    So you can take the 5000 international units of vitamin D from November to March of every year.  The other months you will get vitamin D is you have before with a multivitamin and calcium supplement.  Please take your vitamin D with a handful of water unsalted almonds while with the meal.  Vitamin D is a  fat-soluble vitamin that requires fat in the food to be well absorbed.  We recommend healthy fats such as with nuts, seeds, avocados, olives or with a healthy meal.  Also you may notice a multivitamin has some vitamin D.  Spending up to 30 minutes in the sun during the summer in late spring can provide natural vitamin D to the uncovered skin (arm, legs).    Calcium citrate 600 mg once daily and drink 1 cup of plant-based milk twice daily.  The plan based milk can be almond milk, soy milk, etc.    continue  diet with healthy calcium much foods such as:  Healthy foods that are rich in calcium include: Nuts, seeds, legumes/beans, peas, dark green leafy vegetables, plant-based milk  Additional calcium rich foods listed below  -Soaking almonds  overnight in the fridge with drinking water, can help with softening the almonds and improved absorption of the almonds.  If your lab work shows insufficient calcium or you are unable to consume the foods rich in calcium  ...... some supplement is recommended, such as calcium citrate.    Please continue following with your dentist every 6 months  If you are on an osteoporosis medication, please inform a dentist that invasive dental work with these medications can increase once risk for osteonecrosis of the jaw.  Please continue with good oral hygiene and dental care.    Review of osteoporosis medications  Medications to prevent bone loss and osteoporosis fractures:  -Oral biphosphonate's (such as Actonel, Boniva, Fosamax/alendronate (these are taken either once a week or once a month depending on med dose chosen, first thing in the morning with special instructions to follow.    The duration should be limited to 5 years, to prevent increased risk for atypical fracture of femur.     Discussed medication side effects.  The  risk benefits and treatment options  discussed with patient.       Please schedule follow-up appointment based upon your improvement/failure to improve/chronic  medical conditions and physician recommendations during office appointment at the .       Patient advised to go to er if symptoms worsen or to call answering service, or to return to office for additional evaluation    This note was partially  generated using Dragon voice recognition and there may be incorrect words, wording, spelling, or pronunciation errors that were not corrected prior to committing the note to the medical record.   Manage cravings  Cravings for tobacco happen more often and it     may  take  days and weeks to  quit. The longer you go without smoking, the fewer   urges you  will have. fighting cravings can be easier if you have a plan  Know you're triggers  Make a list of things to try when the urge to smoke hits. Find what works best for you.  Keep your hands busy ...  plan an alternative  instead of picking up a cigarette. Keep hard candies, fruits and vegetables with you.    Drink more water . this helps 2+ to    flush nicotine out of your system faster. It also also reduces   that in the moment craving.  .  Keep moving. Try going for a walk or jog. Go up and down the stairs a few times. Take  dog for a walk. Physical activity even in  short bursts can help with her energy and nicotine craving.  Take slow deep breaths, refill your craving. Slowly inhale through your nose and exhale thruh your mouth. Repeat this 10 times or 2 you're feeling more relaxed.  Stay motivated.  Quitting as a process. Take it one day at a time. The first hours, days and weeks without she was can be hard. Keep a positive outlook can help you get through.  .   IT can take a person an average of 7 times before successfully quitting. Remind herself of all reason to have STARTED . Stay away from situations STIMULATING YOU  to make you want to smoke.  Reward YOURSELF  along the way celebrates your success using money saved on expensive tobacco products to buy something special. Enjoying outdoor height now that you  can breathe easier. Enjoying the fact that YOU NO longer smoke tobacco. Notice that food taste better when you're not smoking or chewing tobacco.  Prevent weight gain  Not everyone will gain weight when they quit smoking. The average waking can be less than 10 pounds. To help prevent weight gain:  8 plenty of fruits and vegetables.    Adequate diet. Plan water it will help keep you fall and persistent. Get MORE  sleep. Exercise go for walks at nighttime and daytime WHEN YOUu would otherwise have cigarette            show

## 2023-08-01 NOTE — ASSESSMENT & PLAN NOTE
DISCUSSED  MEDS...  TRY    CALCIUM  CITRATE / VITAMIN  D  .. EXERCISE    WEIGHT  AND STRENGTHENING    SEE WRAP UP

## 2023-10-31 ENCOUNTER — OFFICE VISIT (OUTPATIENT)
Dept: PRIMARY CARE | Facility: CLINIC | Age: 63
End: 2023-10-31
Payer: COMMERCIAL

## 2023-10-31 VITALS
OXYGEN SATURATION: 95 % | DIASTOLIC BLOOD PRESSURE: 78 MMHG | HEIGHT: 67 IN | RESPIRATION RATE: 18 BRPM | WEIGHT: 128 LBS | TEMPERATURE: 97.9 F | HEART RATE: 78 BPM | BODY MASS INDEX: 20.09 KG/M2 | SYSTOLIC BLOOD PRESSURE: 114 MMHG

## 2023-10-31 DIAGNOSIS — R05.9 COUGH, UNSPECIFIED TYPE: Primary | ICD-10-CM

## 2023-10-31 DIAGNOSIS — J06.9 VIRAL URI: ICD-10-CM

## 2023-10-31 DIAGNOSIS — J02.9 SORE THROAT: ICD-10-CM

## 2023-10-31 DIAGNOSIS — J34.89 RHINORRHEA: ICD-10-CM

## 2023-10-31 PROBLEM — G54.1 LUMBOSACRAL PLEXUS LESION: Status: ACTIVE | Noted: 2022-09-01

## 2023-10-31 LAB
POC RAPID INFLUENZA A: NEGATIVE
POC RAPID INFLUENZA B: NEGATIVE
POC RAPID STREP: NEGATIVE

## 2023-10-31 PROCEDURE — 87804 INFLUENZA ASSAY W/OPTIC: CPT | Performed by: FAMILY MEDICINE

## 2023-10-31 PROCEDURE — 87880 STREP A ASSAY W/OPTIC: CPT | Performed by: FAMILY MEDICINE

## 2023-10-31 PROCEDURE — 87635 SARS-COV-2 COVID-19 AMP PRB: CPT

## 2023-10-31 PROCEDURE — 99213 OFFICE O/P EST LOW 20 MIN: CPT | Performed by: FAMILY MEDICINE

## 2023-10-31 ASSESSMENT — ENCOUNTER SYMPTOMS
SORE THROAT: 1
DIARRHEA: 0
VOMITING: 0
NECK PAIN: 0
WHEEZING: 0
COUGH: 0
SWOLLEN GLANDS: 0
NAUSEA: 0
RHINORRHEA: 0
SINUS PAIN: 0
ABDOMINAL PAIN: 0
HEADACHES: 0
DYSURIA: 0

## 2023-10-31 NOTE — PATIENT INSTRUCTIONS
Please consider exercise program involving walking or some other form of aerobic activity 5 days weekly for 30 minutes... Let's also consider strengthening of large muscle groups like the abdominal muscles or shoulder muscles... Twice weekly with reps of 5/10/15 exercises and gradually increase strength.. This is not heavy strength training but light weight training... Sit ups or back exercise routine.. Please ask for handout if uncertain how to do..This  will help to strengthen your muscles which in turn will help you to lose weight.... You might ask what is the best diet available.. I would strongly encourage you to consider  Weight Watchers.. And as  your  fellow on  Weight Watchers physician attempting to  live this  LIFE  style  choice with you....  I will be glad to give you recommendations on what to eat.. Consider buying Sandra bread.., luis alberto bagle thin bread.. oikos yogurt... eggs  to eat as hard-boiled... Halo top ice cream for snack... All these are delicious foods which.. when eaten and  being compliant eating three  meals daily  breakfast lunch and dinner, drinking  64 ounces of water daily we will all win together !!!!!!!. This will be a means for you to lose weight... Consider also the smart phone paola ... My plate.. Or My  fitness  pal..,  as additional possibilities for weight loss... Good  lucpriscilla Castle!    Discussed medication side effects.  The  risk benefits and treatment options  discussed with patient.       Please schedule follow-up appointment based upon your improvement/failure to improve/chronic medical conditions and physician recommendations during office appointment at the .       Patient advised to go to er if symptoms worsen or to call answering service, or to return to office for additional evaluation    This note was partially  generated using Dragon voice recognition and there may be incorrect words, wording, spelling, or pronunciation errors that were not  corrected prior to committing the note to the medical record.     Please consider the following medications to help    mitigate  Covid during this time  Vitamin C 500 mg  TWICE daily  B complex daily        VITAMIN D 3   200O IU DAILY        Multivitamin with zinc  Extra rest  Stress reduction

## 2023-10-31 NOTE — ASSESSMENT & PLAN NOTE
Rest and drink plenty of water/fluid  Please use a humidifier or   use warm  mist  in a   hot shower ...repeat   3-4 times a day for approximatelly .... 10 minutes at a time..... this lessens congestion....  Use salt water sprays..... as directed... saline sprays  Apply a warm moist wash...cloth to  your face  3-4 times a day  Avoid tobacco smoke and other environmental irritants  see vitamin   hand  out

## 2023-10-31 NOTE — PROGRESS NOTES
"Subjective   Patient ID: Melani Mendieta is a 63 y.o. female who presents for cold and mouth sores.    URI   The current episode started in the past 7 days. The problem has been gradually worsening. There has been no fever. Associated symptoms include a sore throat. Pertinent negatives include no abdominal pain, chest pain, congestion, coughing, diarrhea, dysuria, ear pain, headaches, joint pain, joint swelling, nausea, neck pain, rash, rhinorrhea, sinus pain, sneezing, swollen glands, vomiting or wheezing. She has tried increased fluids for the symptoms. The treatment provided mild relief.        Review of Systems   HENT:  Positive for sore throat. Negative for congestion, ear pain, rhinorrhea, sinus pain and sneezing.    Respiratory:  Negative for cough and wheezing.    Cardiovascular:  Negative for chest pain.   Gastrointestinal:  Negative for abdominal pain, diarrhea, nausea and vomiting.   Genitourinary:  Negative for dysuria.   Musculoskeletal:  Negative for joint pain and neck pain.   Skin:  Negative for rash.   Neurological:  Negative for headaches.       Objective   /78   Pulse 78   Temp 36.6 °C (97.9 °F)   Resp 18   Ht 1.702 m (5' 7\")   Wt 58.1 kg (128 lb)   SpO2 95%   BMI 20.05 kg/m²     Physical Exam  Vitals: I have reviewed the vitals  General: Well-developed.  In no acute distress.  Eyes:   sclera nonicteric.  Conjunctiva not injected.  No discharge.   HEAD: Normocephalic, atraumatic.  HEENT   Mucous membranes moist.   oropharynx e rythematous,  ??   Oral  pharyngeal    erythema no tonsillar exudates.      shotty cervical lymphadenopathy.  Cardio: Regular rate and rhythm.  No murmur, rub or gallop.  Pulmonary: Lungs clear to auscultation in all fields.  No accessory muscle use.  GI/: Normal active bowel sounds.  Soft, nontender.  No masses or organomegaly appreciated.  Musculoskeletal: No gross deformities appreciated.  Neuro: Alert, age-appropriate.  Normal muscle tone.  Moving all " extremities.  Skin: No rash, bruises or lesions.   Assessment/Plan   Problem List Items Addressed This Visit             ICD-10-CM    Cough - Primary R05.9    Relevant Orders    Sars-CoV-2 PCR, Symptomatic (Completed)    POCT Influenza A/B manually resulted (Completed)    POCT Rapid Strep A manually resulted (Completed)    Rhinorrhea J34.89    Sore throat J02.9    Viral URI J06.9     Rest and drink plenty of water/fluid  Please use a humidifier or   use warm  mist  in a   hot shower ...repeat   3-4 times a day for approximatelly .... 10 minutes at a time..... this lessens congestion....  Use salt water sprays..... as directed... saline sprays  Apply a warm moist wash...cloth to  your face  3-4 times a day  Avoid tobacco smoke and other environmental irritants  see vitamin   hand  out

## 2023-11-01 LAB — SARS-COV-2 RNA RESP QL NAA+PROBE: NOT DETECTED

## 2023-11-10 ENCOUNTER — OFFICE VISIT (OUTPATIENT)
Dept: FAMILY MEDICINE CLINIC | Age: 63
End: 2023-11-10
Payer: COMMERCIAL

## 2023-11-10 VITALS
SYSTOLIC BLOOD PRESSURE: 114 MMHG | OXYGEN SATURATION: 100 % | DIASTOLIC BLOOD PRESSURE: 80 MMHG | BODY MASS INDEX: 20.56 KG/M2 | WEIGHT: 131 LBS | HEIGHT: 67 IN | HEART RATE: 72 BPM

## 2023-11-10 DIAGNOSIS — R39.9 UTI SYMPTOMS: Primary | ICD-10-CM

## 2023-11-10 DIAGNOSIS — R39.9 UTI SYMPTOMS: ICD-10-CM

## 2023-11-10 DIAGNOSIS — R10.9 LEFT FLANK PAIN: ICD-10-CM

## 2023-11-10 LAB
BILIRUBIN, POC: NORMAL
BLOOD URINE, POC: NORMAL
CLARITY, POC: CLEAR
COLOR, POC: YELLOW
GLUCOSE URINE, POC: NORMAL
KETONES, POC: NORMAL
LEUKOCYTE EST, POC: NORMAL
NITRITE, POC: NORMAL
PH, POC: 6.5
PROTEIN, POC: NORMAL
SPECIFIC GRAVITY, POC: 1
UROBILINOGEN, POC: NORMAL

## 2023-11-10 PROCEDURE — 81003 URINALYSIS AUTO W/O SCOPE: CPT | Performed by: NURSE PRACTITIONER

## 2023-11-10 PROCEDURE — 99213 OFFICE O/P EST LOW 20 MIN: CPT | Performed by: NURSE PRACTITIONER

## 2023-11-10 RX ORDER — PREDNISONE 20 MG/1
TABLET ORAL
Qty: 20 TABLET | Refills: 0 | Status: SHIPPED | OUTPATIENT
Start: 2023-11-10

## 2023-11-10 RX ORDER — SULFAMETHOXAZOLE AND TRIMETHOPRIM 800; 160 MG/1; MG/1
1 TABLET ORAL 2 TIMES DAILY
Qty: 14 TABLET | Refills: 0 | Status: SHIPPED | OUTPATIENT
Start: 2023-11-10 | End: 2023-11-17

## 2023-11-10 RX ORDER — DOXYCYCLINE HYCLATE 100 MG
100 TABLET ORAL 2 TIMES DAILY
Qty: 14 TABLET | Refills: 0 | Status: SHIPPED | OUTPATIENT
Start: 2023-11-10 | End: 2023-11-17

## 2023-11-10 RX ORDER — BROMPHENIRAMINE MALEATE, PSEUDOEPHEDRINE HYDROCHLORIDE, AND DEXTROMETHORPHAN HYDROBROMIDE 2; 30; 10 MG/5ML; MG/5ML; MG/5ML
5 SYRUP ORAL 4 TIMES DAILY PRN
Qty: 200 ML | Refills: 0 | Status: SHIPPED | OUTPATIENT
Start: 2023-11-10

## 2023-11-10 SDOH — ECONOMIC STABILITY: FOOD INSECURITY: WITHIN THE PAST 12 MONTHS, YOU WORRIED THAT YOUR FOOD WOULD RUN OUT BEFORE YOU GOT MONEY TO BUY MORE.: NEVER TRUE

## 2023-11-10 SDOH — ECONOMIC STABILITY: FOOD INSECURITY: WITHIN THE PAST 12 MONTHS, THE FOOD YOU BOUGHT JUST DIDN'T LAST AND YOU DIDN'T HAVE MONEY TO GET MORE.: NEVER TRUE

## 2023-11-10 SDOH — ECONOMIC STABILITY: HOUSING INSECURITY
IN THE LAST 12 MONTHS, WAS THERE A TIME WHEN YOU DID NOT HAVE A STEADY PLACE TO SLEEP OR SLEPT IN A SHELTER (INCLUDING NOW)?: NO

## 2023-11-10 SDOH — ECONOMIC STABILITY: INCOME INSECURITY: HOW HARD IS IT FOR YOU TO PAY FOR THE VERY BASICS LIKE FOOD, HOUSING, MEDICAL CARE, AND HEATING?: NOT HARD AT ALL

## 2023-11-10 ASSESSMENT — PATIENT HEALTH QUESTIONNAIRE - PHQ9
1. LITTLE INTEREST OR PLEASURE IN DOING THINGS: 0
SUM OF ALL RESPONSES TO PHQ QUESTIONS 1-9: 0
SUM OF ALL RESPONSES TO PHQ QUESTIONS 1-9: 0
SUM OF ALL RESPONSES TO PHQ9 QUESTIONS 1 & 2: 0
2. FEELING DOWN, DEPRESSED OR HOPELESS: 0
SUM OF ALL RESPONSES TO PHQ QUESTIONS 1-9: 0
SUM OF ALL RESPONSES TO PHQ QUESTIONS 1-9: 0

## 2023-11-10 NOTE — PROGRESS NOTES
Subjective  Maria Teresa Ledesma, 61 y.o. female presents today with:  Chief Complaint   Patient presents with    Urinary Tract Infection     Onset- a couple days   Frequency- N  Urgency- Y  Small volume void- Y   Dysuria- N  Pressure- Y   Back pain- Y    Nocturia- Y  Fever/ chills- N  Nausea/ vomiting- N  UTI or other reason for antbx's last 30 days- none        I reviewed staff HPI/chief complaint and do agree with above    Patient presents for concerns of urinary symptoms as noted above that have been present for approximately 2 to 3 days. She denies any issues of recurrent or chronic UTIs. Denies any fever/chills, hematuria, denies any use of over-the-counter medications          Review of Systems   Constitutional:  Negative for appetite change, chills, fatigue and fever. Respiratory:  Negative for cough, chest tightness and shortness of breath. Cardiovascular:  Negative for chest pain and palpitations. Gastrointestinal:  Positive for abdominal pain (Abdominal pressure). Negative for constipation, diarrhea, nausea and vomiting. Genitourinary:  Positive for urgency. Negative for difficulty urinating, dysuria, flank pain, hematuria and pelvic pain. Musculoskeletal:  Positive for back pain. Negative for myalgias. Skin:  Negative for rash. Neurological:  Negative for dizziness, weakness, light-headedness and headaches. Hematological:  Negative for adenopathy.        Past Medical History:   Diagnosis Date    Cancer ([address])     Chronic back pain      Past Surgical History:   Procedure Laterality Date    BREAST ENHANCEMENT SURGERY Bilateral 2021     SECTION      COLONOSCOPY      COLONOSCOPY N/A 2021    COLORECTAL CANCER SCREENING with polypectomies performed by Elijah Favre, MD at [address], TOTAL ABDOMINAL (CERVIX REMOVED)      LAPAROSCOPY      OVARY REMOVAL      SKIN CANCER EXCISION      basal cell skin cancer excised per patient     Social History

## 2023-11-11 LAB — BACTERIA UR CULT: NORMAL

## 2023-11-20 ASSESSMENT — ENCOUNTER SYMPTOMS
CHEST TIGHTNESS: 0
CONSTIPATION: 0
ABDOMINAL PAIN: 1
COUGH: 0
BACK PAIN: 1
VOMITING: 0
SHORTNESS OF BREATH: 0
DIARRHEA: 0
NAUSEA: 0

## 2023-12-04 ENCOUNTER — APPOINTMENT (OUTPATIENT)
Dept: PRIMARY CARE | Facility: CLINIC | Age: 63
End: 2023-12-04
Payer: COMMERCIAL

## 2023-12-05 DIAGNOSIS — Z52.001 STEM CELL DONOR: Primary | ICD-10-CM

## 2023-12-14 DIAGNOSIS — Z52.001 DONOR OF STEM CELL: Primary | ICD-10-CM

## 2023-12-27 ENCOUNTER — LAB (OUTPATIENT)
Dept: OTHER | Facility: HOSPITAL | Age: 63
End: 2023-12-27
Payer: SUBSIDIZED

## 2023-12-27 ENCOUNTER — APPOINTMENT (OUTPATIENT)
Dept: OTHER | Facility: HOSPITAL | Age: 63
End: 2023-12-27
Payer: SUBSIDIZED

## 2023-12-27 ENCOUNTER — DOCUMENTATION (OUTPATIENT)
Dept: OTHER | Facility: HOSPITAL | Age: 63
End: 2023-12-27
Payer: COMMERCIAL

## 2023-12-27 ENCOUNTER — HOSPITAL ENCOUNTER (OUTPATIENT)
Dept: RADIOLOGY | Facility: HOSPITAL | Age: 63
Discharge: HOME | End: 2023-12-27
Payer: SUBSIDIZED

## 2023-12-27 VITALS
RESPIRATION RATE: 18 BRPM | DIASTOLIC BLOOD PRESSURE: 88 MMHG | OXYGEN SATURATION: 100 % | SYSTOLIC BLOOD PRESSURE: 127 MMHG | WEIGHT: 128.09 LBS | HEART RATE: 80 BPM | BODY MASS INDEX: 20.06 KG/M2 | TEMPERATURE: 97.9 F

## 2023-12-27 DIAGNOSIS — Z52.001 DONOR OF STEM CELL: Primary | ICD-10-CM

## 2023-12-27 DIAGNOSIS — Z52.001 STEM CELL DONOR: ICD-10-CM

## 2023-12-27 LAB
ABO GROUP (TYPE) IN BLOOD: NORMAL
ALBUMIN SERPL BCP-MCNC: 4.3 G/DL (ref 3.4–5)
ALP SERPL-CCNC: 45 U/L (ref 33–136)
ALT SERPL W P-5'-P-CCNC: 8 U/L (ref 7–45)
ANION GAP SERPL CALC-SCNC: 11 MMOL/L (ref 10–20)
ANTIBODY SCREEN: NORMAL
APTT PPP: 31 SECONDS (ref 27–38)
AST SERPL W P-5'-P-CCNC: 13 U/L (ref 9–39)
BASOPHILS # BLD AUTO: 0.07 X10*3/UL (ref 0–0.1)
BASOPHILS NFR BLD AUTO: 0.8 %
BILIRUB DIRECT SERPL-MCNC: 0.1 MG/DL (ref 0–0.3)
BILIRUB SERPL-MCNC: 0.6 MG/DL (ref 0–1.2)
BUN SERPL-MCNC: 10 MG/DL (ref 6–23)
CALCIUM SERPL-MCNC: 9.8 MG/DL (ref 8.6–10.6)
CHLORIDE SERPL-SCNC: 108 MMOL/L (ref 98–107)
CMV IGG AVIDITY SERPL IA-RTO: REACTIVE %
CO2 SERPL-SCNC: 27 MMOL/L (ref 21–32)
CREAT SERPL-MCNC: 0.78 MG/DL (ref 0.5–1.05)
EBV EA IGG SER QL: NEGATIVE
EBV NA AB SER QL: POSITIVE
EBV VCA IGG SER IA-ACNC: POSITIVE
EBV VCA IGM SER IA-ACNC: ABNORMAL
EOSINOPHIL # BLD AUTO: 0.04 X10*3/UL (ref 0–0.7)
EOSINOPHIL NFR BLD AUTO: 0.4 %
ERYTHROCYTE [DISTWIDTH] IN BLOOD BY AUTOMATED COUNT: 13.9 % (ref 11.5–14.5)
GFR SERPL CREATININE-BSD FRML MDRD: 85 ML/MIN/1.73M*2
GLUCOSE SERPL-MCNC: 107 MG/DL (ref 74–99)
HBV CORE AB SER QL: NONREACTIVE
HBV CORE IGM SER QL: NONREACTIVE
HBV SURFACE AB SER-ACNC: <3.1 MIU/ML
HBV SURFACE AG SERPL QL IA: NONREACTIVE
HCT VFR BLD AUTO: 40.7 % (ref 36–46)
HCV AB SER QL: NONREACTIVE
HEMOGLOBIN A2: 2.8 % (ref 2–3.5)
HEMOGLOBIN A: 96.8 % (ref 95.8–98)
HEMOGLOBIN F: 0.4 % (ref 0–2)
HEMOGLOBIN IDENTIFICATION INTERPRETATION: NORMAL
HERPES SIMPLEX VIRUS 1 IGG: >8 INDEX
HERPES SIMPLEX VIRUS 2 IGG: <0.2 INDEX
HGB BLD-MCNC: 14 G/DL (ref 12–16)
HIV 1+2 AB+HIV1 P24 AG SERPL QL IA: NONREACTIVE
IMM GRANULOCYTES # BLD AUTO: 0.02 X10*3/UL (ref 0–0.7)
IMM GRANULOCYTES NFR BLD AUTO: 0.2 % (ref 0–0.9)
INR PPP: 0.9 (ref 0.9–1.1)
LYMPHOCYTES # BLD AUTO: 2.4 X10*3/UL (ref 1.2–4.8)
LYMPHOCYTES NFR BLD AUTO: 26.1 %
MCH RBC QN AUTO: 32.4 PG (ref 26–34)
MCHC RBC AUTO-ENTMCNC: 34.4 G/DL (ref 32–36)
MCV RBC AUTO: 94 FL (ref 80–100)
MONOCYTES # BLD AUTO: 0.5 X10*3/UL (ref 0.1–1)
MONOCYTES NFR BLD AUTO: 5.4 %
NEUTROPHILS # BLD AUTO: 6.16 X10*3/UL (ref 1.2–7.7)
NEUTROPHILS NFR BLD AUTO: 67.1 %
NRBC BLD-RTO: 0 /100 WBCS (ref 0–0)
PATH REVIEW-HGB IDENTIFICATION: NORMAL
PHOSPHATE SERPL-MCNC: 3 MG/DL (ref 2.5–4.9)
PLATELET # BLD AUTO: 285 X10*3/UL (ref 150–450)
POTASSIUM SERPL-SCNC: 4.2 MMOL/L (ref 3.5–5.3)
PROT SERPL-MCNC: 6.6 G/DL (ref 6.4–8.2)
PROTHROMBIN TIME: 10.1 SECONDS (ref 9.8–12.8)
RBC # BLD AUTO: 4.32 X10*6/UL (ref 4–5.2)
RH FACTOR (ANTIGEN D): NORMAL
SODIUM SERPL-SCNC: 142 MMOL/L (ref 136–145)
T GONDII IGG SER-ACNC: NONREACTIVE
T PALLIDUM AB SER QL: NONREACTIVE
VARICELLA ZOSTER IGG INDEX: 3 IA
VZV IGG SER QL IA: POSITIVE
WBC # BLD AUTO: 9.2 X10*3/UL (ref 4.4–11.3)

## 2023-12-27 PROCEDURE — 80053 COMPREHEN METABOLIC PANEL: CPT

## 2023-12-27 PROCEDURE — 86790 VIRUS ANTIBODY NOS: CPT

## 2023-12-27 PROCEDURE — 86777 TOXOPLASMA ANTIBODY: CPT

## 2023-12-27 PROCEDURE — 86780 TREPONEMA PALLIDUM: CPT

## 2023-12-27 PROCEDURE — 83020 HEMOGLOBIN ELECTROPHORESIS: CPT | Performed by: PATHOLOGY

## 2023-12-27 PROCEDURE — 86644 CMV ANTIBODY: CPT

## 2023-12-27 PROCEDURE — 81371 HLA I & II TYPE VERIFY LR: CPT

## 2023-12-27 PROCEDURE — 86645 CMV ANTIBODY IGM: CPT

## 2023-12-27 PROCEDURE — 71046 X-RAY EXAM CHEST 2 VIEWS: CPT

## 2023-12-27 PROCEDURE — 86705 HEP B CORE ANTIBODY IGM: CPT

## 2023-12-27 PROCEDURE — 86901 BLOOD TYPING SEROLOGIC RH(D): CPT

## 2023-12-27 PROCEDURE — 86696 HERPES SIMPLEX TYPE 2 TEST: CPT

## 2023-12-27 PROCEDURE — 84100 ASSAY OF PHOSPHORUS: CPT

## 2023-12-27 PROCEDURE — 86665 EPSTEIN-BARR CAPSID VCA: CPT

## 2023-12-27 PROCEDURE — 86787 VARICELLA-ZOSTER ANTIBODY: CPT

## 2023-12-27 PROCEDURE — 86704 HEP B CORE ANTIBODY TOTAL: CPT

## 2023-12-27 PROCEDURE — 85610 PROTHROMBIN TIME: CPT

## 2023-12-27 PROCEDURE — 93005 ELECTROCARDIOGRAM TRACING: CPT

## 2023-12-27 PROCEDURE — 86706 HEP B SURFACE ANTIBODY: CPT

## 2023-12-27 PROCEDURE — 86803 HEPATITIS C AB TEST: CPT

## 2023-12-27 PROCEDURE — 87389 HIV-1 AG W/HIV-1&-2 AB AG IA: CPT

## 2023-12-27 PROCEDURE — 85025 COMPLETE CBC W/AUTO DIFF WBC: CPT

## 2023-12-27 PROCEDURE — 82248 BILIRUBIN DIRECT: CPT

## 2023-12-27 PROCEDURE — 83021 HEMOGLOBIN CHROMOTOGRAPHY: CPT

## 2023-12-27 PROCEDURE — 36415 COLL VENOUS BLD VENIPUNCTURE: CPT

## 2023-12-27 PROCEDURE — 87340 HEPATITIS B SURFACE AG IA: CPT

## 2023-12-27 PROCEDURE — 86664 EPSTEIN-BARR NUCLEAR ANTIGEN: CPT

## 2023-12-27 PROCEDURE — 86753 PROTOZOA ANTIBODY NOS: CPT

## 2023-12-27 PROCEDURE — 87798 DETECT AGENT NOS DNA AMP: CPT

## 2023-12-27 PROCEDURE — 71046 X-RAY EXAM CHEST 2 VIEWS: CPT | Performed by: RADIOLOGY

## 2023-12-27 PROCEDURE — 86695 HERPES SIMPLEX TYPE 1 TEST: CPT

## 2023-12-27 ASSESSMENT — PAIN SCALES - GENERAL: PAINLEVEL: 0-NO PAIN

## 2023-12-27 NOTE — PROGRESS NOTES
Met Donor (recipient Alex eHwitt [phone]) on SCC 2. Reviewed and provided calendar for mobilization/collection. Questions answered. Pt had CxR and will have labs, EKG and DHQ today also. I will meet pt at 1/5 Dr. Vyas visit as well. She has my contact information.

## 2023-12-27 NOTE — PROGRESS NOTES
Pt ambulated to SCT unit without assistance, unaccompanied for a DHQ, donating to brother. Pt denies complaints or pain today. Vitals obtained, blood drawn peripherally per orders and sent to lab. Education provided to patient on questionnaire, collection process, side effects related to ACDA, and possible central line placement. An EKG also obtained. Pt ambulated off unit without complaints or assistance.

## 2023-12-28 LAB — HTLV I+II AB SER QL IA: NEGATIVE

## 2023-12-29 LAB
CMV IGM SERPL-ACNC: <8 AU/ML
DNA CLASS I + II VERIFICATION TYPING: NORMAL
EBV VCA IGM SER-ACNC: 11.8 U/ML (ref 0–43.9)
HLA RESULTS: NORMAL

## 2023-12-30 LAB
ABO GROUP BLD DONR: ABNORMAL
CMV AB SERPL DONR QL: REACTIVE
HBV CORE AB SER DONR QL IA: NEGATIVE
HBV SURFACE AG SER DONR QL IA: NEGATIVE
HCV AB SER DONR QL IA: NEGATIVE
HIV 1+2 AB+HIV1 P24 AG SERPL QL IA: NEGATIVE
HIV1 RNA SERPL DONR QL PROBE AMP: ABNORMAL
HTLV I+II AB SER DONR QL: NEGATIVE
RH BLD: POSITIVE
T PALLIDUM IGG SER DONR QL: NON REACTIVE
WNV RNA SPEC QL NAA+PROBE: NON REACTIVE

## 2024-01-02 LAB — T CRUZI AB SERPL QL IA: NEGATIVE

## 2024-01-04 DIAGNOSIS — Z52.001 DONOR OF STEM CELL: Primary | ICD-10-CM

## 2024-01-05 ENCOUNTER — DOCUMENTATION (OUTPATIENT)
Dept: OTHER | Facility: HOSPITAL | Age: 64
End: 2024-01-05
Payer: COMMERCIAL

## 2024-01-05 ENCOUNTER — OFFICE VISIT (OUTPATIENT)
Dept: HEMATOLOGY/ONCOLOGY | Facility: HOSPITAL | Age: 64
End: 2024-01-05
Payer: MEDICARE

## 2024-01-05 VITALS
WEIGHT: 129.3 LBS | OXYGEN SATURATION: 98 % | HEART RATE: 84 BPM | TEMPERATURE: 97.2 F | DIASTOLIC BLOOD PRESSURE: 78 MMHG | BODY MASS INDEX: 20.25 KG/M2 | RESPIRATION RATE: 17 BRPM | SYSTOLIC BLOOD PRESSURE: 134 MMHG

## 2024-01-05 DIAGNOSIS — Z52.001 DONOR OF STEM CELL: Primary | ICD-10-CM

## 2024-01-05 PROCEDURE — 99214 OFFICE O/P EST MOD 30 MIN: CPT | Performed by: STUDENT IN AN ORGANIZED HEALTH CARE EDUCATION/TRAINING PROGRAM

## 2024-01-05 ASSESSMENT — PAIN SCALES - GENERAL: PAINLEVEL: 0-NO PAIN

## 2024-01-05 ASSESSMENT — ENCOUNTER SYMPTOMS
DEPRESSION: 0
LOSS OF SENSATION IN FEET: 0
OCCASIONAL FEELINGS OF UNSTEADINESS: 0

## 2024-01-08 ENCOUNTER — TREATMENT (OUTPATIENT)
Dept: OTHER | Facility: HOSPITAL | Age: 64
End: 2024-01-08
Payer: SUBSIDIZED

## 2024-01-08 ENCOUNTER — TELEPHONE (OUTPATIENT)
Dept: OTHER | Facility: HOSPITAL | Age: 64
End: 2024-01-08

## 2024-01-08 ENCOUNTER — OFFICE VISIT (OUTPATIENT)
Dept: OTHER | Facility: HOSPITAL | Age: 64
End: 2024-01-08
Payer: SUBSIDIZED

## 2024-01-08 VITALS
RESPIRATION RATE: 18 BRPM | HEART RATE: 84 BPM | TEMPERATURE: 98.1 F | WEIGHT: 130.73 LBS | BODY MASS INDEX: 20.48 KG/M2 | OXYGEN SATURATION: 97 % | SYSTOLIC BLOOD PRESSURE: 129 MMHG | DIASTOLIC BLOOD PRESSURE: 79 MMHG

## 2024-01-08 DIAGNOSIS — Z52.001 STEM CELL DONOR: Primary | ICD-10-CM

## 2024-01-08 DIAGNOSIS — Z52.001 DONOR OF STEM CELL: ICD-10-CM

## 2024-01-08 LAB
ALBUMIN SERPL BCP-MCNC: 4.3 G/DL (ref 3.4–5)
ALP SERPL-CCNC: 45 U/L (ref 33–136)
ALT SERPL W P-5'-P-CCNC: 9 U/L (ref 7–45)
ANION GAP SERPL CALC-SCNC: 10 MMOL/L (ref 10–20)
AST SERPL W P-5'-P-CCNC: 13 U/L (ref 9–39)
BASOPHILS # BLD AUTO: 0.07 X10*3/UL (ref 0–0.1)
BASOPHILS NFR BLD AUTO: 0.8 %
BILIRUB SERPL-MCNC: 0.3 MG/DL (ref 0–1.2)
BUN SERPL-MCNC: 10 MG/DL (ref 6–23)
CALCIUM SERPL-MCNC: 9.4 MG/DL (ref 8.6–10.3)
CHLORIDE SERPL-SCNC: 106 MMOL/L (ref 98–107)
CO2 SERPL-SCNC: 28 MMOL/L (ref 21–32)
CREAT SERPL-MCNC: 0.76 MG/DL (ref 0.5–1.05)
EGFRCR SERPLBLD CKD-EPI 2021: 88 ML/MIN/1.73M*2
EOSINOPHIL # BLD AUTO: 0.14 X10*3/UL (ref 0–0.7)
EOSINOPHIL NFR BLD AUTO: 1.5 %
ERYTHROCYTE [DISTWIDTH] IN BLOOD BY AUTOMATED COUNT: 13.5 % (ref 11.5–14.5)
GLUCOSE SERPL-MCNC: 99 MG/DL (ref 74–99)
HCT VFR BLD AUTO: 41 % (ref 36–46)
HGB BLD-MCNC: 13.9 G/DL (ref 12–16)
IMM GRANULOCYTES # BLD AUTO: 0.03 X10*3/UL (ref 0–0.7)
IMM GRANULOCYTES NFR BLD AUTO: 0.3 % (ref 0–0.9)
LYMPHOCYTES # BLD AUTO: 2.79 X10*3/UL (ref 1.2–4.8)
LYMPHOCYTES NFR BLD AUTO: 30.6 %
MAGNESIUM SERPL-MCNC: 2.05 MG/DL (ref 1.6–2.4)
MCH RBC QN AUTO: 32.1 PG (ref 26–34)
MCHC RBC AUTO-ENTMCNC: 33.9 G/DL (ref 32–36)
MCV RBC AUTO: 95 FL (ref 80–100)
MONOCYTES # BLD AUTO: 0.49 X10*3/UL (ref 0.1–1)
MONOCYTES NFR BLD AUTO: 5.4 %
NEUTROPHILS # BLD AUTO: 5.59 X10*3/UL (ref 1.2–7.7)
NEUTROPHILS NFR BLD AUTO: 61.4 %
NRBC BLD-RTO: 0 /100 WBCS (ref 0–0)
PLATELET # BLD AUTO: 324 X10*3/UL (ref 150–450)
POTASSIUM SERPL-SCNC: 4 MMOL/L (ref 3.5–5.3)
PROT SERPL-MCNC: 6.5 G/DL (ref 6.4–8.2)
RBC # BLD AUTO: 4.33 X10*6/UL (ref 4–5.2)
SARS-COV-2 RNA RESP QL NAA+PROBE: NOT DETECTED
SODIUM SERPL-SCNC: 140 MMOL/L (ref 136–145)
WBC # BLD AUTO: 9.1 X10*3/UL (ref 4.4–11.3)

## 2024-01-08 PROCEDURE — 99214 OFFICE O/P EST MOD 30 MIN: CPT | Performed by: NURSE PRACTITIONER

## 2024-01-08 PROCEDURE — 80053 COMPREHEN METABOLIC PANEL: CPT

## 2024-01-08 PROCEDURE — 83735 ASSAY OF MAGNESIUM: CPT

## 2024-01-08 PROCEDURE — 85025 COMPLETE CBC W/AUTO DIFF WBC: CPT

## 2024-01-08 PROCEDURE — 87635 SARS-COV-2 COVID-19 AMP PRB: CPT

## 2024-01-08 PROCEDURE — 36415 COLL VENOUS BLD VENIPUNCTURE: CPT

## 2024-01-08 RX ORDER — CALCIUM CARBONATE 200(500)MG
2 TABLET,CHEWABLE ORAL EVERY 5 MIN PRN
Status: CANCELLED | OUTPATIENT
Start: 2024-01-15

## 2024-01-08 RX ORDER — DIPHENHYDRAMINE HYDROCHLORIDE 50 MG/ML
50 INJECTION INTRAMUSCULAR; INTRAVENOUS AS NEEDED
Status: CANCELLED | OUTPATIENT
Start: 2024-01-15

## 2024-01-08 RX ORDER — MAGNESIUM SULFATE HEPTAHYDRATE 40 MG/ML
4 INJECTION, SOLUTION INTRAVENOUS ONCE AS NEEDED
Status: CANCELLED | OUTPATIENT
Start: 2024-01-15

## 2024-01-08 RX ORDER — FAMOTIDINE 10 MG/ML
20 INJECTION INTRAVENOUS ONCE AS NEEDED
Status: CANCELLED | OUTPATIENT
Start: 2024-01-15

## 2024-01-08 RX ORDER — HEPARIN SODIUM,PORCINE/PF 10 UNIT/ML
50 SYRINGE (ML) INTRAVENOUS AS NEEDED
OUTPATIENT
Start: 2024-01-08

## 2024-01-08 RX ORDER — EPINEPHRINE 0.3 MG/.3ML
0.3 INJECTION SUBCUTANEOUS EVERY 5 MIN PRN
Status: CANCELLED | OUTPATIENT
Start: 2024-01-11

## 2024-01-08 RX ORDER — HEPARIN 100 UNIT/ML
500 SYRINGE INTRAVENOUS AS NEEDED
OUTPATIENT
Start: 2024-01-08

## 2024-01-08 RX ORDER — FAMOTIDINE 10 MG/ML
20 INJECTION INTRAVENOUS ONCE AS NEEDED
Status: CANCELLED | OUTPATIENT
Start: 2024-01-11

## 2024-01-08 RX ORDER — DIPHENHYDRAMINE HYDROCHLORIDE 50 MG/ML
50 INJECTION INTRAMUSCULAR; INTRAVENOUS AS NEEDED
Status: CANCELLED | OUTPATIENT
Start: 2024-01-11

## 2024-01-08 RX ORDER — EPINEPHRINE 0.3 MG/.3ML
0.3 INJECTION SUBCUTANEOUS EVERY 5 MIN PRN
Status: CANCELLED | OUTPATIENT
Start: 2024-01-15

## 2024-01-08 RX ORDER — ALBUTEROL SULFATE 0.83 MG/ML
3 SOLUTION RESPIRATORY (INHALATION) AS NEEDED
Status: CANCELLED | OUTPATIENT
Start: 2024-01-11

## 2024-01-08 RX ORDER — LANOLIN ALCOHOL/MO/W.PET/CERES
400 CREAM (GRAM) TOPICAL ONCE AS NEEDED
Status: CANCELLED | OUTPATIENT
Start: 2024-01-15

## 2024-01-08 RX ORDER — POTASSIUM CHLORIDE 750 MG/1
40 TABLET, FILM COATED, EXTENDED RELEASE ORAL ONCE AS NEEDED
Status: CANCELLED | OUTPATIENT
Start: 2024-01-15

## 2024-01-08 RX ORDER — MAGNESIUM SULFATE HEPTAHYDRATE 40 MG/ML
2 INJECTION, SOLUTION INTRAVENOUS ONCE AS NEEDED
Status: CANCELLED | OUTPATIENT
Start: 2024-01-15

## 2024-01-08 RX ORDER — ALBUTEROL SULFATE 0.83 MG/ML
3 SOLUTION RESPIRATORY (INHALATION) AS NEEDED
Status: CANCELLED | OUTPATIENT
Start: 2024-01-15

## 2024-01-08 NOTE — PROGRESS NOTES
Pt ambulated onto the unit independently. Eulogio and vitals obtained. Blood drawn peripherally and sent to lab. Covid swab obtained and sent to lab. Estefany Warner, RN coordinator and Yolanda Davies CNP came and spoke with patient. Pt required no further interventions and ambulated off unit with all belongings.

## 2024-01-08 NOTE — PROGRESS NOTES
History   Patient ID: Melani Mendieta is a 63 y.o. female.  Interval Notes: Patient re-presents for evaluation as a normal donor for her brother  Unchanged from last visit Feeling in her normal state of health, no recent illnesses or vaccines.  History of tobacco abuse  Anxiety/depression       Exam   Physical Exam  Vitals reviewed.   Constitutional:       Appearance: Normal appearance. She is normal weight.   HENT:      Head: Normocephalic and atraumatic.      Nose: Nose normal.      Mouth/Throat:      Mouth: Mucous membranes are moist.      Pharynx: Oropharynx is clear.   Eyes:      Extraocular Movements: Extraocular movements intact.      Conjunctiva/sclera: Conjunctivae normal.      Pupils: Pupils are equal, round, and reactive to light.   Cardiovascular:      Rate and Rhythm: Normal rate and regular rhythm.      Pulses: Normal pulses.      Heart sounds: Normal heart sounds.   Pulmonary:      Effort: Pulmonary effort is normal.      Breath sounds: Normal breath sounds.   Abdominal:      General: Abdomen is flat. Bowel sounds are normal.   Musculoskeletal:         General: Normal range of motion.      Cervical back: Normal range of motion and neck supple.   Skin:     General: Skin is warm and dry.   Neurological:      General: No focal deficit present.      Mental Status: She is alert and oriented to person, place, and time. Mental status is at baseline.   Psychiatric:         Mood and Affect: Mood normal.         Behavior: Behavior normal.         Thought Content: Thought content normal.         Judgment: Judgment normal.       ECOG Performance Status:   Asymptomatic    Assessment/Plan   There are no diagnoses linked to this encounter.  - will order Mg, Potassium Antinausea medication, can use tylenol or tramadol for pain associated with neupogen  - re-consented patient for collection  - will need line placement, will follow up after collection  30 minutes were spent reviewing the patients chart, discussing  symptoms, performing physical exam, reviewing lab results with patient and going over the plan of care

## 2024-01-09 RX ORDER — POTASSIUM CHLORIDE 20 MEQ/1
20 TABLET, EXTENDED RELEASE ORAL DAILY
Qty: 30 TABLET | Refills: 0 | Status: SHIPPED | OUTPATIENT
Start: 2024-01-09 | End: 2024-02-08

## 2024-01-09 RX ORDER — LANOLIN ALCOHOL/MO/W.PET/CERES
400 CREAM (GRAM) TOPICAL DAILY
Qty: 30 TABLET | Refills: 0 | Status: SHIPPED | OUTPATIENT
Start: 2024-01-09 | End: 2025-01-08

## 2024-01-11 ENCOUNTER — TREATMENT (OUTPATIENT)
Dept: OTHER | Facility: HOSPITAL | Age: 64
End: 2024-01-11
Payer: SUBSIDIZED

## 2024-01-11 VITALS
WEIGHT: 128.09 LBS | TEMPERATURE: 97.5 F | OXYGEN SATURATION: 99 % | DIASTOLIC BLOOD PRESSURE: 75 MMHG | BODY MASS INDEX: 20.06 KG/M2 | SYSTOLIC BLOOD PRESSURE: 124 MMHG | HEART RATE: 75 BPM | RESPIRATION RATE: 18 BRPM

## 2024-01-11 DIAGNOSIS — Z52.001 DONOR OF STEM CELL: ICD-10-CM

## 2024-01-11 PROCEDURE — 96372 THER/PROPH/DIAG INJ SC/IM: CPT | Performed by: STUDENT IN AN ORGANIZED HEALTH CARE EDUCATION/TRAINING PROGRAM

## 2024-01-11 PROCEDURE — 2500000004 HC RX 250 GENERAL PHARMACY W/ HCPCS (ALT 636 FOR OP/ED): Mod: JZ | Performed by: STUDENT IN AN ORGANIZED HEALTH CARE EDUCATION/TRAINING PROGRAM

## 2024-01-11 RX ORDER — EPINEPHRINE 0.3 MG/.3ML
0.3 INJECTION SUBCUTANEOUS EVERY 5 MIN PRN
Status: CANCELLED | OUTPATIENT
Start: 2024-01-12

## 2024-01-11 RX ORDER — DIPHENHYDRAMINE HYDROCHLORIDE 50 MG/ML
50 INJECTION INTRAMUSCULAR; INTRAVENOUS AS NEEDED
Status: CANCELLED | OUTPATIENT
Start: 2024-01-12

## 2024-01-11 RX ORDER — FAMOTIDINE 10 MG/ML
20 INJECTION INTRAVENOUS ONCE AS NEEDED
Status: CANCELLED | OUTPATIENT
Start: 2024-01-12

## 2024-01-11 RX ORDER — ALBUTEROL SULFATE 0.83 MG/ML
3 SOLUTION RESPIRATORY (INHALATION) AS NEEDED
Status: CANCELLED | OUTPATIENT
Start: 2024-01-12

## 2024-01-11 RX ADMIN — FILGRASTIM-SNDZ 600 MCG: 300 INJECTION, SOLUTION INTRAVENOUS; SUBCUTANEOUS at 09:09

## 2024-01-11 NOTE — PROGRESS NOTES
Pt presents to ASCTU via self-ambulation for filgrastim injection 600 mcg given in the right upper arm. Patient tolerated well. Pt left via self-ambulation.

## 2024-01-12 ENCOUNTER — TREATMENT (OUTPATIENT)
Dept: OTHER | Facility: HOSPITAL | Age: 64
End: 2024-01-12
Payer: SUBSIDIZED

## 2024-01-12 DIAGNOSIS — Z52.001 DONOR OF STEM CELL: ICD-10-CM

## 2024-01-12 PROCEDURE — 2500000004 HC RX 250 GENERAL PHARMACY W/ HCPCS (ALT 636 FOR OP/ED): Mod: JZ | Performed by: STUDENT IN AN ORGANIZED HEALTH CARE EDUCATION/TRAINING PROGRAM

## 2024-01-12 PROCEDURE — 96372 THER/PROPH/DIAG INJ SC/IM: CPT | Performed by: STUDENT IN AN ORGANIZED HEALTH CARE EDUCATION/TRAINING PROGRAM

## 2024-01-12 RX ORDER — DIPHENHYDRAMINE HYDROCHLORIDE 50 MG/ML
50 INJECTION INTRAMUSCULAR; INTRAVENOUS AS NEEDED
Status: CANCELLED | OUTPATIENT
Start: 2024-01-13

## 2024-01-12 RX ORDER — EPINEPHRINE 0.3 MG/.3ML
0.3 INJECTION SUBCUTANEOUS EVERY 5 MIN PRN
Status: CANCELLED | OUTPATIENT
Start: 2024-01-13

## 2024-01-12 RX ORDER — FAMOTIDINE 10 MG/ML
20 INJECTION INTRAVENOUS ONCE AS NEEDED
Status: CANCELLED | OUTPATIENT
Start: 2024-01-13

## 2024-01-12 RX ORDER — ALBUTEROL SULFATE 0.83 MG/ML
3 SOLUTION RESPIRATORY (INHALATION) AS NEEDED
Status: CANCELLED | OUTPATIENT
Start: 2024-01-13

## 2024-01-12 RX ADMIN — FILGRASTIM-SNDZ 600 MCG: 300 INJECTION, SOLUTION INTRAVENOUS; SUBCUTANEOUS at 09:41

## 2024-01-12 NOTE — PROGRESS NOTES
Pt ambulated to SCT unit without assistance. Pt denies complaints or pain today. Vitals obtained, pt received Zarxio 600mcg in left upper arm. Pt ambulated off unit without complaints or assistance.

## 2024-01-13 ENCOUNTER — INFUSION (OUTPATIENT)
Dept: HEMATOLOGY/ONCOLOGY | Facility: HOSPITAL | Age: 64
End: 2024-01-13
Payer: SUBSIDIZED

## 2024-01-13 VITALS
HEART RATE: 80 BPM | SYSTOLIC BLOOD PRESSURE: 124 MMHG | RESPIRATION RATE: 16 BRPM | OXYGEN SATURATION: 98 % | TEMPERATURE: 97.5 F | DIASTOLIC BLOOD PRESSURE: 74 MMHG | WEIGHT: 132.94 LBS | BODY MASS INDEX: 20.82 KG/M2

## 2024-01-13 DIAGNOSIS — G89.18 POST-OPERATIVE PAIN: Primary | ICD-10-CM

## 2024-01-13 DIAGNOSIS — Z52.001 DONOR OF STEM CELL: ICD-10-CM

## 2024-01-13 PROCEDURE — 96372 THER/PROPH/DIAG INJ SC/IM: CPT | Performed by: STUDENT IN AN ORGANIZED HEALTH CARE EDUCATION/TRAINING PROGRAM

## 2024-01-13 PROCEDURE — 96372 THER/PROPH/DIAG INJ SC/IM: CPT

## 2024-01-13 PROCEDURE — 2500000004 HC RX 250 GENERAL PHARMACY W/ HCPCS (ALT 636 FOR OP/ED): Mod: JZ | Performed by: STUDENT IN AN ORGANIZED HEALTH CARE EDUCATION/TRAINING PROGRAM

## 2024-01-13 RX ORDER — EPINEPHRINE 0.3 MG/.3ML
0.3 INJECTION SUBCUTANEOUS EVERY 5 MIN PRN
Status: CANCELLED | OUTPATIENT
Start: 2024-01-14

## 2024-01-13 RX ORDER — FAMOTIDINE 10 MG/ML
20 INJECTION INTRAVENOUS ONCE AS NEEDED
Status: CANCELLED | OUTPATIENT
Start: 2024-01-14

## 2024-01-13 RX ORDER — ALBUTEROL SULFATE 0.83 MG/ML
3 SOLUTION RESPIRATORY (INHALATION) AS NEEDED
Status: CANCELLED | OUTPATIENT
Start: 2024-01-14

## 2024-01-13 RX ORDER — DIPHENHYDRAMINE HYDROCHLORIDE 50 MG/ML
50 INJECTION INTRAMUSCULAR; INTRAVENOUS AS NEEDED
Status: CANCELLED | OUTPATIENT
Start: 2024-01-14

## 2024-01-13 RX ORDER — OXYCODONE HYDROCHLORIDE 5 MG/1
5 TABLET ORAL EVERY 6 HOURS PRN
Qty: 5 TABLET | Refills: 0 | Status: SHIPPED | OUTPATIENT
Start: 2024-01-13 | End: 2024-01-14

## 2024-01-13 RX ADMIN — FILGRASTIM-SNDZ 600 MCG: 300 INJECTION, SOLUTION INTRAVENOUS; SUBCUTANEOUS at 09:25

## 2024-01-13 ASSESSMENT — PAIN SCALES - GENERAL: PAINLEVEL: 10-WORST PAIN EVER

## 2024-01-14 ENCOUNTER — INFUSION (OUTPATIENT)
Dept: HEMATOLOGY/ONCOLOGY | Facility: HOSPITAL | Age: 64
End: 2024-01-14
Payer: SUBSIDIZED

## 2024-01-14 VITALS
DIASTOLIC BLOOD PRESSURE: 63 MMHG | OXYGEN SATURATION: 98 % | WEIGHT: 129.63 LBS | RESPIRATION RATE: 16 BRPM | BODY MASS INDEX: 20.3 KG/M2 | SYSTOLIC BLOOD PRESSURE: 121 MMHG | HEART RATE: 88 BPM | TEMPERATURE: 97.2 F

## 2024-01-14 DIAGNOSIS — Z52.001 DONOR OF STEM CELL: ICD-10-CM

## 2024-01-14 PROCEDURE — 96372 THER/PROPH/DIAG INJ SC/IM: CPT

## 2024-01-14 PROCEDURE — 2500000004 HC RX 250 GENERAL PHARMACY W/ HCPCS (ALT 636 FOR OP/ED): Mod: JZ | Performed by: STUDENT IN AN ORGANIZED HEALTH CARE EDUCATION/TRAINING PROGRAM

## 2024-01-14 PROCEDURE — 96372 THER/PROPH/DIAG INJ SC/IM: CPT | Mod: 59 | Performed by: STUDENT IN AN ORGANIZED HEALTH CARE EDUCATION/TRAINING PROGRAM

## 2024-01-14 RX ORDER — ALBUTEROL SULFATE 0.83 MG/ML
3 SOLUTION RESPIRATORY (INHALATION) AS NEEDED
Status: DISCONTINUED | OUTPATIENT
Start: 2024-01-14 | End: 2024-01-14 | Stop reason: HOSPADM

## 2024-01-14 RX ORDER — FAMOTIDINE 10 MG/ML
20 INJECTION INTRAVENOUS ONCE AS NEEDED
Status: DISCONTINUED | OUTPATIENT
Start: 2024-01-14 | End: 2024-01-14 | Stop reason: HOSPADM

## 2024-01-14 RX ORDER — FAMOTIDINE 10 MG/ML
20 INJECTION INTRAVENOUS ONCE AS NEEDED
Status: CANCELLED | OUTPATIENT
Start: 2024-01-15

## 2024-01-14 RX ORDER — EPINEPHRINE 0.3 MG/.3ML
0.3 INJECTION SUBCUTANEOUS EVERY 5 MIN PRN
Status: CANCELLED | OUTPATIENT
Start: 2024-01-15

## 2024-01-14 RX ORDER — DIPHENHYDRAMINE HYDROCHLORIDE 50 MG/ML
50 INJECTION INTRAMUSCULAR; INTRAVENOUS AS NEEDED
Status: CANCELLED | OUTPATIENT
Start: 2024-01-15

## 2024-01-14 RX ORDER — DIPHENHYDRAMINE HYDROCHLORIDE 50 MG/ML
50 INJECTION INTRAMUSCULAR; INTRAVENOUS AS NEEDED
Status: DISCONTINUED | OUTPATIENT
Start: 2024-01-14 | End: 2024-01-14 | Stop reason: HOSPADM

## 2024-01-14 RX ORDER — ALBUTEROL SULFATE 0.83 MG/ML
3 SOLUTION RESPIRATORY (INHALATION) AS NEEDED
Status: CANCELLED | OUTPATIENT
Start: 2024-01-15

## 2024-01-14 RX ORDER — EPINEPHRINE 0.3 MG/.3ML
0.3 INJECTION SUBCUTANEOUS EVERY 5 MIN PRN
Status: DISCONTINUED | OUTPATIENT
Start: 2024-01-14 | End: 2024-01-14 | Stop reason: HOSPADM

## 2024-01-14 RX ADMIN — FILGRASTIM-SNDZ 600 MCG: 300 INJECTION, SOLUTION INTRAVENOUS; SUBCUTANEOUS at 09:13

## 2024-01-14 ASSESSMENT — PAIN SCALES - GENERAL: PAINLEVEL: 0-NO PAIN

## 2024-01-15 ENCOUNTER — TREATMENT (OUTPATIENT)
Dept: OTHER | Facility: HOSPITAL | Age: 64
End: 2024-01-15
Payer: SUBSIDIZED

## 2024-01-15 ENCOUNTER — APPOINTMENT (OUTPATIENT)
Dept: OTHER | Facility: HOSPITAL | Age: 64
End: 2024-01-15
Payer: COMMERCIAL

## 2024-01-15 ENCOUNTER — HOSPITAL ENCOUNTER (OUTPATIENT)
Dept: RADIOLOGY | Facility: HOSPITAL | Age: 64
Discharge: HOME | End: 2024-01-15
Payer: SUBSIDIZED

## 2024-01-15 ENCOUNTER — TELEPHONE (OUTPATIENT)
Dept: OTHER | Facility: HOSPITAL | Age: 64
End: 2024-01-15

## 2024-01-15 VITALS
RESPIRATION RATE: 18 BRPM | SYSTOLIC BLOOD PRESSURE: 127 MMHG | WEIGHT: 130.07 LBS | TEMPERATURE: 98.2 F | BODY MASS INDEX: 20.37 KG/M2 | HEART RATE: 78 BPM | OXYGEN SATURATION: 95 % | DIASTOLIC BLOOD PRESSURE: 77 MMHG

## 2024-01-15 VITALS
OXYGEN SATURATION: 98 % | RESPIRATION RATE: 18 BRPM | TEMPERATURE: 99.5 F | SYSTOLIC BLOOD PRESSURE: 126 MMHG | DIASTOLIC BLOOD PRESSURE: 82 MMHG | HEART RATE: 71 BPM

## 2024-01-15 VITALS
RESPIRATION RATE: 15 BRPM | TEMPERATURE: 96.6 F | DIASTOLIC BLOOD PRESSURE: 83 MMHG | SYSTOLIC BLOOD PRESSURE: 134 MMHG | OXYGEN SATURATION: 98 % | HEART RATE: 86 BPM

## 2024-01-15 DIAGNOSIS — Z52.001 STEM CELL DONOR: ICD-10-CM

## 2024-01-15 DIAGNOSIS — Z52.001 DONOR OF STEM CELL: ICD-10-CM

## 2024-01-15 LAB
ABO GROUP (TYPE) IN BLOOD: NORMAL
ABO GROUP (TYPE) IN BLOOD: NORMAL
ALBUMIN SERPL BCP-MCNC: 3.4 G/DL (ref 3.4–5)
ALBUMIN SERPL BCP-MCNC: 4.1 G/DL (ref 3.4–5)
ALP SERPL-CCNC: 127 U/L (ref 33–136)
ALP SERPL-CCNC: 174 U/L (ref 33–136)
ALT SERPL W P-5'-P-CCNC: 10 U/L (ref 7–45)
ALT SERPL W P-5'-P-CCNC: 5 U/L (ref 7–45)
ANION GAP SERPL CALC-SCNC: 11 MMOL/L (ref 10–20)
ANION GAP SERPL CALC-SCNC: 9 MMOL/L (ref 10–20)
ANTIBODY SCREEN: NORMAL
AST SERPL W P-5'-P-CCNC: 17 U/L (ref 9–39)
AST SERPL W P-5'-P-CCNC: 18 U/L (ref 9–39)
AUTOMATED IMMATURE GRAN % COLLECTION: 12.4 %
AUTOMATED IMMATURE GRAN ABSOLUTE COLLECTION: 31.28 X10*3/UL
BASOPHIL % COLLECTION: 0 %
BASOPHIL ABSOLUTE COLLECTION: 0.02 X10*3/UL
BASOPHILS # BLD MANUAL: 0 X10*3/UL (ref 0–0.1)
BASOPHILS NFR BLD MANUAL: 0 %
BILIRUB SERPL-MCNC: 0.2 MG/DL (ref 0–1.2)
BILIRUB SERPL-MCNC: 0.3 MG/DL (ref 0–1.2)
BUN SERPL-MCNC: 10 MG/DL (ref 6–23)
BUN SERPL-MCNC: 9 MG/DL (ref 6–23)
CA-I BLD-SCNC: 1.24 MMOL/L (ref 1.1–1.33)
CALCIUM SERPL-MCNC: 10.7 MG/DL (ref 8.6–10.6)
CALCIUM SERPL-MCNC: 9.1 MG/DL (ref 8.6–10.3)
CD3 CELLS NFR BLD: 28 %
CD34 CELLS # SPEC: 741.57 VIABLE CD34+/UL
CD34 CELLS NFR SPEC: 0.05 %
CHLORIDE SERPL-SCNC: 103 MMOL/L (ref 98–107)
CHLORIDE SERPL-SCNC: 104 MMOL/L (ref 98–107)
CO2 SERPL-SCNC: 27 MMOL/L (ref 21–32)
CO2 SERPL-SCNC: 35 MMOL/L (ref 21–32)
CREAT SERPL-MCNC: 0.74 MG/DL (ref 0.5–1.05)
CREAT SERPL-MCNC: 0.87 MG/DL (ref 0.5–1.05)
DOHLE BOD BLD QL SMEAR: PRESENT
EGFRCR SERPLBLD CKD-EPI 2021: 75 ML/MIN/1.73M*2
EGFRCR SERPLBLD CKD-EPI 2021: >90 ML/MIN/1.73M*2
EOSINOPHIL # BLD MANUAL: 1 X10*3/UL (ref 0–0.7)
EOSINOPHIL % COLLECTION: 0 %
EOSINOPHIL ABSOLUTE COLLECTION: 0 X10*3/UL
EOSINOPHIL NFR BLD MANUAL: 2 %
ERYTHROCYTE [DISTWIDTH] IN BLOOD BY AUTOMATED COUNT: 14.5 % (ref 11.5–14.5)
ERYTHROCYTE [DISTWIDTH] IN BLOOD BY AUTOMATED COUNT: 14.6 % (ref 11.5–14.5)
GLUCOSE SERPL-MCNC: 119 MG/DL (ref 74–99)
GLUCOSE SERPL-MCNC: 123 MG/DL (ref 74–99)
HCT VFR BLD AUTO: 35.7 % (ref 36–46)
HCT VFR BLD AUTO: 39.3 % (ref 36–46)
HCT, COLLECTION: 2.9 %
HGB BLD-MCNC: 12.4 G/DL (ref 12–16)
HGB BLD-MCNC: 13.3 G/DL (ref 12–16)
HGB, COLLECTION: 0.6 G/DL
IMM GRANULOCYTES # BLD AUTO: 2.42 X10*3/UL (ref 0–0.7)
IMM GRANULOCYTES NFR BLD AUTO: 4.9 % (ref 0–0.9)
LYMPHOCYTE % COLLECTION: 46.5 %
LYMPHOCYTE ABSOLUTE COLLECTION: 117.14 X10*3/UL
LYMPHOCYTES # BLD MANUAL: 4.99 X10*3/UL (ref 1.2–4.8)
LYMPHOCYTES NFR BLD MANUAL: 10 %
MAGNESIUM SERPL-MCNC: 1.53 MG/DL (ref 1.6–2.4)
MAGNESIUM SERPL-MCNC: 1.95 MG/DL (ref 1.6–2.4)
MCH RBC QN AUTO: 31.9 PG (ref 26–34)
MCH RBC QN AUTO: 33.6 PG (ref 26–34)
MCHC RBC AUTO-ENTMCNC: 33.8 G/DL (ref 32–36)
MCHC RBC AUTO-ENTMCNC: 34.7 G/DL (ref 32–36)
MCV RBC AUTO: 94 FL (ref 80–100)
MCV RBC AUTO: 97 FL (ref 80–100)
METAMYELOCYTES # BLD MANUAL: 0.5 X10*3/UL
METAMYELOCYTES NFR BLD MANUAL: 1 %
MONOCYTE % COLLECTION: 20.9 %
MONOCYTE ABSOLUTE COLLECTION: 52.54 X10*3/UL
MONOCYTES # BLD MANUAL: 1.5 X10*3/UL (ref 0.1–1)
MONOCYTES NFR BLD MANUAL: 3 %
NEUTROPHIL % COLLECTION: 20.2 %
NEUTROPHIL ABSOLUTE COLLECTION: 51 X10*3/UL
NEUTROPHILS # BLD MANUAL: 41.92 X10*3/UL (ref 1.2–7.7)
NEUTS BAND # BLD MANUAL: 0.5 X10*3/UL (ref 0–0.7)
NEUTS BAND NFR BLD MANUAL: 1 %
NEUTS SEG # BLD MANUAL: 41.42 X10*3/UL (ref 1.2–7)
NEUTS SEG NFR BLD MANUAL: 83 %
NRBC BLD-RTO: 0.1 /100 WBCS (ref 0–0)
NRBC BLD-RTO: 0.1 /100 WBCS (ref 0–0)
NUCLEATED RBC, COLLECTION: 0.3 /100 WBCS
PLATELET # BLD AUTO: 103 X10*3/UL (ref 150–450)
PLATELET # BLD AUTO: 276 X10*3/UL (ref 150–450)
PLT, COLLECTION: 1835 X10*3/UL
POLYCHROMASIA BLD QL SMEAR: ABNORMAL
POTASSIUM SERPL-SCNC: 3.2 MMOL/L (ref 3.5–5.3)
POTASSIUM SERPL-SCNC: 3.5 MMOL/L (ref 3.5–5.3)
PROT SERPL-MCNC: 5 G/DL (ref 6.4–8.2)
PROT SERPL-MCNC: 6.3 G/DL (ref 6.4–8.2)
RBC # BLD AUTO: 3.69 X10*6/UL (ref 4–5.2)
RBC # BLD AUTO: 4.17 X10*6/UL (ref 4–5.2)
RBC MORPH BLD: ABNORMAL
RBC, COLLECTION: 0.28 X10*6/UL
RH FACTOR (ANTIGEN D): NORMAL
RH FACTOR (ANTIGEN D): NORMAL
SODIUM SERPL-SCNC: 138 MMOL/L (ref 136–145)
SODIUM SERPL-SCNC: 144 MMOL/L (ref 136–145)
TOTAL CELLS COUNTED BLD: 100
VIABLE CELLS NFR SPEC: 99.2 %
WBC # BLD AUTO: 40.3 X10*3/UL (ref 4.4–11.3)
WBC # BLD AUTO: 49.9 X10*3/UL (ref 4.4–11.3)
WBC, COLLECTION: 252 X10*3/UL

## 2024-01-15 PROCEDURE — 82330 ASSAY OF CALCIUM: CPT

## 2024-01-15 PROCEDURE — 88185 FLOWCYTOMETRY/TC ADD-ON: CPT | Mod: TC

## 2024-01-15 PROCEDURE — 85025 COMPLETE CBC W/AUTO DIFF WBC: CPT

## 2024-01-15 PROCEDURE — 96372 THER/PROPH/DIAG INJ SC/IM: CPT

## 2024-01-15 PROCEDURE — C1752 CATH,HEMODIALYSIS,SHORT-TERM: HCPCS

## 2024-01-15 PROCEDURE — 85027 COMPLETE CBC AUTOMATED: CPT

## 2024-01-15 PROCEDURE — 85007 BL SMEAR W/DIFF WBC COUNT: CPT | Mod: CCI

## 2024-01-15 PROCEDURE — 83735 ASSAY OF MAGNESIUM: CPT

## 2024-01-15 PROCEDURE — 80053 COMPREHEN METABOLIC PANEL: CPT

## 2024-01-15 PROCEDURE — 77001 FLUOROGUIDE FOR VEIN DEVICE: CPT | Performed by: RADIOLOGY

## 2024-01-15 PROCEDURE — 85027 COMPLETE CBC AUTOMATED: CPT | Mod: 59

## 2024-01-15 PROCEDURE — 2500000004 HC RX 250 GENERAL PHARMACY W/ HCPCS (ALT 636 FOR OP/ED): Mod: JZ

## 2024-01-15 PROCEDURE — 2500000004 HC RX 250 GENERAL PHARMACY W/ HCPCS (ALT 636 FOR OP/ED): Performed by: STUDENT IN AN ORGANIZED HEALTH CARE EDUCATION/TRAINING PROGRAM

## 2024-01-15 PROCEDURE — 36511 APHERESIS WBC: CPT

## 2024-01-15 PROCEDURE — 36010 PLACE CATHETER IN VEIN: CPT | Performed by: RADIOLOGY

## 2024-01-15 PROCEDURE — 2500000001 HC RX 250 WO HCPCS SELF ADMINISTERED DRUGS (ALT 637 FOR MEDICARE OP): Performed by: STUDENT IN AN ORGANIZED HEALTH CARE EDUCATION/TRAINING PROGRAM

## 2024-01-15 PROCEDURE — 76937 US GUIDE VASCULAR ACCESS: CPT | Performed by: RADIOLOGY

## 2024-01-15 PROCEDURE — 2500000005 HC RX 250 GENERAL PHARMACY W/O HCPCS: Performed by: STUDENT IN AN ORGANIZED HEALTH CARE EDUCATION/TRAINING PROGRAM

## 2024-01-15 PROCEDURE — 2780000003 HC OR 278 NO HCPCS

## 2024-01-15 PROCEDURE — 96366 THER/PROPH/DIAG IV INF ADDON: CPT

## 2024-01-15 PROCEDURE — 2500000004 HC RX 250 GENERAL PHARMACY W/ HCPCS (ALT 636 FOR OP/ED): Performed by: RADIOLOGY

## 2024-01-15 PROCEDURE — 96365 THER/PROPH/DIAG IV INF INIT: CPT

## 2024-01-15 PROCEDURE — 86901 BLOOD TYPING SEROLOGIC RH(D): CPT

## 2024-01-15 PROCEDURE — 36556 INSERT NON-TUNNEL CV CATH: CPT | Performed by: RADIOLOGY

## 2024-01-15 RX ORDER — FAMOTIDINE 10 MG/ML
20 INJECTION INTRAVENOUS ONCE AS NEEDED
Status: CANCELLED | OUTPATIENT
Start: 2024-01-16

## 2024-01-15 RX ORDER — DIPHENHYDRAMINE HYDROCHLORIDE 50 MG/ML
50 INJECTION INTRAMUSCULAR; INTRAVENOUS AS NEEDED
Status: CANCELLED | OUTPATIENT
Start: 2024-01-16

## 2024-01-15 RX ORDER — HEPARIN SODIUM 1000 [USP'U]/ML
2000 INJECTION, SOLUTION INTRAVENOUS; SUBCUTANEOUS AS NEEDED
Start: 2024-01-15

## 2024-01-15 RX ORDER — POTASSIUM CHLORIDE 20 MEQ/1
40 TABLET, EXTENDED RELEASE ORAL ONCE AS NEEDED
Status: CANCELLED | OUTPATIENT
Start: 2024-01-16

## 2024-01-15 RX ORDER — FAMOTIDINE 10 MG/ML
20 INJECTION INTRAVENOUS ONCE AS NEEDED
Status: CANCELLED | OUTPATIENT
Start: 2024-01-15

## 2024-01-15 RX ORDER — DIPHENHYDRAMINE HCL 25 MG
25 CAPSULE ORAL ONCE
OUTPATIENT
Start: 2024-01-15

## 2024-01-15 RX ORDER — ALBUTEROL SULFATE 0.83 MG/ML
3 SOLUTION RESPIRATORY (INHALATION) AS NEEDED
OUTPATIENT
Start: 2024-01-15

## 2024-01-15 RX ORDER — CALCIUM CARBONATE 200(500)MG
2 TABLET,CHEWABLE ORAL EVERY 5 MIN PRN
Status: COMPLETED | OUTPATIENT
Start: 2024-01-15 | End: 2024-01-15

## 2024-01-15 RX ORDER — HEPARIN SODIUM 1000 [USP'U]/ML
2000 INJECTION, SOLUTION INTRAVENOUS; SUBCUTANEOUS AS NEEDED
Status: DISCONTINUED | OUTPATIENT
Start: 2024-01-15 | End: 2024-01-16 | Stop reason: HOSPADM

## 2024-01-15 RX ORDER — EPINEPHRINE 0.3 MG/.3ML
0.3 INJECTION SUBCUTANEOUS EVERY 5 MIN PRN
Status: CANCELLED | OUTPATIENT
Start: 2024-01-15

## 2024-01-15 RX ORDER — HEPARIN SODIUM,PORCINE/PF 10 UNIT/ML
50 SYRINGE (ML) INTRAVENOUS AS NEEDED
OUTPATIENT
Start: 2024-01-15

## 2024-01-15 RX ORDER — MAGNESIUM SULFATE HEPTAHYDRATE 40 MG/ML
2 INJECTION, SOLUTION INTRAVENOUS ONCE AS NEEDED
Status: CANCELLED | OUTPATIENT
Start: 2024-01-16

## 2024-01-15 RX ORDER — EPINEPHRINE 0.3 MG/.3ML
0.3 INJECTION SUBCUTANEOUS EVERY 5 MIN PRN
OUTPATIENT
Start: 2024-01-15

## 2024-01-15 RX ORDER — EPINEPHRINE 0.3 MG/.3ML
0.3 INJECTION SUBCUTANEOUS EVERY 5 MIN PRN
Status: CANCELLED | OUTPATIENT
Start: 2024-01-16

## 2024-01-15 RX ORDER — ALBUTEROL SULFATE 0.83 MG/ML
3 SOLUTION RESPIRATORY (INHALATION) AS NEEDED
Status: CANCELLED | OUTPATIENT
Start: 2024-01-16

## 2024-01-15 RX ORDER — ALBUTEROL SULFATE 0.83 MG/ML
3 SOLUTION RESPIRATORY (INHALATION) AS NEEDED
Status: CANCELLED | OUTPATIENT
Start: 2024-01-15

## 2024-01-15 RX ORDER — DIPHENHYDRAMINE HYDROCHLORIDE 50 MG/ML
50 INJECTION INTRAMUSCULAR; INTRAVENOUS AS NEEDED
OUTPATIENT
Start: 2024-01-15

## 2024-01-15 RX ORDER — MIDAZOLAM HYDROCHLORIDE 1 MG/ML
INJECTION INTRAMUSCULAR; INTRAVENOUS
Status: COMPLETED | OUTPATIENT
Start: 2024-01-15 | End: 2024-01-15

## 2024-01-15 RX ORDER — DIPHENHYDRAMINE HYDROCHLORIDE 50 MG/ML
50 INJECTION INTRAMUSCULAR; INTRAVENOUS AS NEEDED
Status: CANCELLED | OUTPATIENT
Start: 2024-01-15

## 2024-01-15 RX ORDER — ACETAMINOPHEN 325 MG/1
650 TABLET ORAL ONCE
Status: CANCELLED | OUTPATIENT
Start: 2024-01-15

## 2024-01-15 RX ORDER — LANOLIN ALCOHOL/MO/W.PET/CERES
400 CREAM (GRAM) TOPICAL ONCE AS NEEDED
Status: CANCELLED | OUTPATIENT
Start: 2024-01-16

## 2024-01-15 RX ORDER — DIPHENHYDRAMINE HCL 25 MG
25 CAPSULE ORAL ONCE
Status: CANCELLED | OUTPATIENT
Start: 2024-01-15

## 2024-01-15 RX ORDER — MAGNESIUM SULFATE HEPTAHYDRATE 40 MG/ML
4 INJECTION, SOLUTION INTRAVENOUS ONCE AS NEEDED
Status: CANCELLED | OUTPATIENT
Start: 2024-01-16

## 2024-01-15 RX ORDER — CALCIUM CARBONATE 200(500)MG
2 TABLET,CHEWABLE ORAL EVERY 5 MIN PRN
Status: CANCELLED | OUTPATIENT
Start: 2024-01-16

## 2024-01-15 RX ORDER — FAMOTIDINE 10 MG/ML
20 INJECTION INTRAVENOUS ONCE AS NEEDED
OUTPATIENT
Start: 2024-01-15

## 2024-01-15 RX ORDER — ACETAMINOPHEN 325 MG/1
650 TABLET ORAL ONCE
Status: COMPLETED | OUTPATIENT
Start: 2024-01-15 | End: 2024-01-15

## 2024-01-15 RX ORDER — FENTANYL CITRATE 50 UG/ML
INJECTION, SOLUTION INTRAMUSCULAR; INTRAVENOUS
Status: COMPLETED | OUTPATIENT
Start: 2024-01-15 | End: 2024-01-15

## 2024-01-15 RX ORDER — HEPARIN 100 UNIT/ML
500 SYRINGE INTRAVENOUS AS NEEDED
OUTPATIENT
Start: 2024-01-15

## 2024-01-15 RX ORDER — ACETAMINOPHEN 325 MG/1
650 TABLET ORAL ONCE
OUTPATIENT
Start: 2024-01-15

## 2024-01-15 RX ADMIN — CALCIUM CARBONATE (ANTACID) CHEW TAB 500 MG 1000 MG: 500 CHEW TAB at 14:54

## 2024-01-15 RX ADMIN — CALCIUM CHLORIDE 1.7 G: 100 INJECTION, SOLUTION INTRAVENOUS at 12:34

## 2024-01-15 RX ADMIN — FENTANYL CITRATE 50 MCG: 50 INJECTION, SOLUTION INTRAMUSCULAR; INTRAVENOUS at 08:30

## 2024-01-15 RX ADMIN — CALCIUM CARBONATE (ANTACID) CHEW TAB 500 MG 1000 MG: 500 CHEW TAB at 14:41

## 2024-01-15 RX ADMIN — ACETAMINOPHEN 650 MG: 325 TABLET ORAL at 15:56

## 2024-01-15 RX ADMIN — HEPARIN SODIUM 2000 UNITS: 1000 INJECTION, SOLUTION INTRAVENOUS; SUBCUTANEOUS at 16:48

## 2024-01-15 RX ADMIN — FILGRASTIM-SNDZ 600 MCG: 300 INJECTION, SOLUTION INTRAVENOUS; SUBCUTANEOUS at 09:30

## 2024-01-15 RX ADMIN — CALCIUM CARBONATE (ANTACID) CHEW TAB 500 MG 1000 MG: 500 CHEW TAB at 11:34

## 2024-01-15 RX ADMIN — HEPARIN SODIUM 2000 UNITS: 1000 INJECTION, SOLUTION INTRAVENOUS; SUBCUTANEOUS at 16:49

## 2024-01-15 RX ADMIN — MIDAZOLAM HYDROCHLORIDE 1 MG: 1 INJECTION, SOLUTION INTRAMUSCULAR; INTRAVENOUS at 08:30

## 2024-01-15 ASSESSMENT — PAIN SCALES - GENERAL
PAINLEVEL_OUTOF10: 3
PAINLEVEL_OUTOF10: 0 - NO PAIN
PAINLEVEL_OUTOF10: 0 - NO PAIN
PAINLEVEL_OUTOF10: 10 - WORST POSSIBLE PAIN
PAINLEVEL_OUTOF10: 0 - NO PAIN
PAINLEVEL: 5

## 2024-01-15 ASSESSMENT — PAIN DESCRIPTION - ORIENTATION: ORIENTATION: RIGHT

## 2024-01-15 ASSESSMENT — PAIN - FUNCTIONAL ASSESSMENT: PAIN_FUNCTIONAL_ASSESSMENT: 0-10

## 2024-01-15 ASSESSMENT — PAIN DESCRIPTION - LOCATION: LOCATION: NECK

## 2024-01-15 NOTE — Clinical Note
R. internal jugular Trialysis cath insertion complete. Pt received versed 1mg and fentanyl 50 mcg IVP, sedation time 10 minutes

## 2024-01-15 NOTE — PRE-PROCEDURE NOTE
Interventional Radiology Preprocedure Note    Indication for procedure: The encounter diagnosis was Donor of stem cell.    Relevant review of systems: NA    Relevant Labs:   Lab Results   Component Value Date    CREATININE 0.76 01/08/2024    EGFR 88 01/08/2024    INR 0.9 12/27/2023    PROTIME 10.1 12/27/2023       Planned Sedation/Anesthesia: Moderate    Airway assessment: normal    Directed physical examination:    AO*3, neck sot and nontender. No evidence of skin rash or infection    Mallampati: II (hard and soft palate, upper portion of tonsils anduvula visible)    ASA Score: ASA 1 - Normal health patient    Benefits, risks and alternatives of procedure and planned sedation have been discussed with the patient and/or their representative. All questions answered and they agree to proceed.

## 2024-01-15 NOTE — PROGRESS NOTES
Pt arrived to unit via wheel chair from IR. Weight and vitals obtained. Pt received Filgrastim injections (2) totaling 600mcg in the back of the left upper arm. Pt tolerated well with no reactions noted.

## 2024-01-15 NOTE — POST-PROCEDURE NOTE
Interventional Radiology Brief Postprocedure Note    Attending: Dr. Guerrero    Assistant: none    Diagnosis: healthy donor    Technically successful ultrasound and fluoroscopy guided placement of a triple-lumen left internal jugular central line. Please refer to full radiology report for further details.     Anesthesia:  MAC    Complications: None    Estimated Blood Loss: minimal    Medications (Filter: Administrations occurring from 0815 to 0842 on 01/15/24) As of 01/15/24 0842      fentaNYL PF (Sublimaze) injection (mcg) Total dose:  50 mcg      Date/Time Rate/Dose/Volume Action       01/15/24  0830 50 mcg Given               midazolam (Versed) injection (mg) Total dose:  1 mg      Date/Time Rate/Dose/Volume Action       01/15/24  0830 1 mg Given                   No specimens collected      See detailed result report with images in PACS.    The patient tolerated the procedure well without incident or complication and is in stable condition.

## 2024-01-15 NOTE — PROGRESS NOTES
Pt arrived to unit alert, oriented and ambulatory, accompanied by armando. Vitals taken and labs drawn via apheresis catheter; okay to use line verified. Report was called to Dr. Jayla MD and OK to Proceed given at 0955. Collection began at 1008. Pt reported perioral numbness and tingling. PO tums were given and IV calcium was started since the collection was extended to 6 hours. At 1440 pt reported worsening numbness and  tingling, machine adjustments were made, 2 doses of tums were given and IV calcium rate was increased. Pt was able to tolerate the remainder of the collection. At the end of collection, vitals were taken and labs were drawn from patient and product. Apheresis catheter was flushed with heparin and saline per orders and caps replaced. Post labs were reviewed with patient and she was informed someone would call with electrolyte levels. Pt confirmed understanding of all instructions and teaching and has no additional learning needs at this time.  Pt left the unit at 1710 alert, oriented and ambulatory, accompanied by armando and instructed to return at 8am for a second day of collection.

## 2024-01-16 ENCOUNTER — TREATMENT (OUTPATIENT)
Dept: OTHER | Facility: HOSPITAL | Age: 64
End: 2024-01-16
Payer: SUBSIDIZED

## 2024-01-16 VITALS
DIASTOLIC BLOOD PRESSURE: 84 MMHG | HEART RATE: 80 BPM | TEMPERATURE: 98.4 F | RESPIRATION RATE: 18 BRPM | SYSTOLIC BLOOD PRESSURE: 133 MMHG | BODY MASS INDEX: 20.65 KG/M2 | OXYGEN SATURATION: 97 % | WEIGHT: 131.84 LBS

## 2024-01-16 DIAGNOSIS — Z52.001 DONOR OF STEM CELL: ICD-10-CM

## 2024-01-16 DIAGNOSIS — Z52.001 STEM CELL DONOR: Primary | ICD-10-CM

## 2024-01-16 DIAGNOSIS — Z52.001 STEM CELL DONOR: ICD-10-CM

## 2024-01-16 LAB
ALBUMIN SERPL BCP-MCNC: 3.3 G/DL (ref 3.4–5)
ALBUMIN SERPL BCP-MCNC: 4.1 G/DL (ref 3.4–5)
ALP SERPL-CCNC: 145 U/L (ref 33–136)
ALP SERPL-CCNC: 160 U/L (ref 33–136)
ALT SERPL W P-5'-P-CCNC: 11 U/L (ref 7–45)
ALT SERPL W P-5'-P-CCNC: 9 U/L (ref 7–45)
ANION GAP SERPL CALC-SCNC: 10 MMOL/L (ref 10–20)
ANION GAP SERPL CALC-SCNC: 13 MMOL/L (ref 10–20)
AST SERPL W P-5'-P-CCNC: 15 U/L (ref 9–39)
AST SERPL W P-5'-P-CCNC: 18 U/L (ref 9–39)
AUTOMATED IMMATURE GRAN % COLLECTION: 14.1 %
AUTOMATED IMMATURE GRAN ABSOLUTE COLLECTION: 32.55 X10*3/UL
BASOPHIL % COLLECTION: 0 %
BASOPHIL ABSOLUTE COLLECTION: 0.01 X10*3/UL
BASOPHILS # BLD MANUAL: 0.47 X10*3/UL (ref 0–0.1)
BASOPHILS NFR BLD MANUAL: 1 %
BILIRUB SERPL-MCNC: 0.2 MG/DL (ref 0–1.2)
BILIRUB SERPL-MCNC: 0.3 MG/DL (ref 0–1.2)
BUN SERPL-MCNC: 8 MG/DL (ref 6–23)
BUN SERPL-MCNC: 9 MG/DL (ref 6–23)
CA-I BLD-SCNC: 1.28 MMOL/L (ref 1.1–1.33)
CALCIUM SERPL-MCNC: 10.7 MG/DL (ref 8.6–10.3)
CALCIUM SERPL-MCNC: 9.5 MG/DL (ref 8.6–10.3)
CD3 CELLS NFR BLD: 21 %
CD34 CELLS # SPEC: 486.55 VIABLE CD34+/UL
CD34 CELLS NFR SPEC: 0.04 %
CHLORIDE SERPL-SCNC: 106 MMOL/L (ref 98–107)
CHLORIDE SERPL-SCNC: 99 MMOL/L (ref 98–107)
CO2 SERPL-SCNC: 27 MMOL/L (ref 21–32)
CO2 SERPL-SCNC: 34 MMOL/L (ref 21–32)
CREAT SERPL-MCNC: 0.76 MG/DL (ref 0.5–1.05)
CREAT SERPL-MCNC: 0.77 MG/DL (ref 0.5–1.05)
DOHLE BOD BLD QL SMEAR: PRESENT
EGFRCR SERPLBLD CKD-EPI 2021: 87 ML/MIN/1.73M*2
EGFRCR SERPLBLD CKD-EPI 2021: 88 ML/MIN/1.73M*2
EOSINOPHIL # BLD MANUAL: 0 X10*3/UL (ref 0–0.7)
EOSINOPHIL % COLLECTION: 0 %
EOSINOPHIL ABSOLUTE COLLECTION: 0 X10*3/UL
EOSINOPHIL NFR BLD MANUAL: 0 %
ERYTHROCYTE [DISTWIDTH] IN BLOOD BY AUTOMATED COUNT: 14.6 % (ref 11.5–14.5)
ERYTHROCYTE [DISTWIDTH] IN BLOOD BY AUTOMATED COUNT: 14.6 % (ref 11.5–14.5)
GLUCOSE SERPL-MCNC: 92 MG/DL (ref 74–99)
GLUCOSE SERPL-MCNC: 94 MG/DL (ref 74–99)
HCT VFR BLD AUTO: 33.8 % (ref 36–46)
HCT VFR BLD AUTO: 39.8 % (ref 36–46)
HCT, COLLECTION: 2 %
HGB BLD-MCNC: 11.7 G/DL (ref 12–16)
HGB BLD-MCNC: 13.7 G/DL (ref 12–16)
HGB, COLLECTION: 0.5 G/DL
HOLD SPECIMEN: NORMAL
IMM GRANULOCYTES # BLD AUTO: 3.37 X10*3/UL (ref 0–0.7)
IMM GRANULOCYTES NFR BLD AUTO: 7.2 % (ref 0–0.9)
LYMPHOCYTE % COLLECTION: 34.3 %
LYMPHOCYTE ABSOLUTE COLLECTION: 79.35 X10*3/UL
LYMPHOCYTES # BLD MANUAL: 0.94 X10*3/UL (ref 1.2–4.8)
LYMPHOCYTES NFR BLD MANUAL: 2 %
MAGNESIUM SERPL-MCNC: 1.37 MG/DL (ref 1.6–2.4)
MAGNESIUM SERPL-MCNC: 1.71 MG/DL (ref 1.6–2.4)
MCH RBC QN AUTO: 32 PG (ref 26–34)
MCH RBC QN AUTO: 32.2 PG (ref 26–34)
MCHC RBC AUTO-ENTMCNC: 34.4 G/DL (ref 32–36)
MCHC RBC AUTO-ENTMCNC: 34.6 G/DL (ref 32–36)
MCV RBC AUTO: 93 FL (ref 80–100)
MCV RBC AUTO: 93 FL (ref 80–100)
MONOCYTE % COLLECTION: 28.3 %
MONOCYTE ABSOLUTE COLLECTION: 65.44 X10*3/UL
MONOCYTES # BLD MANUAL: 2.35 X10*3/UL (ref 0.1–1)
MONOCYTES NFR BLD MANUAL: 5 %
MYELOCYTES # BLD MANUAL: 0.47 X10*3/UL
MYELOCYTES NFR BLD MANUAL: 1 %
NEUTROPHIL % COLLECTION: 23.3 %
NEUTROPHIL ABSOLUTE COLLECTION: 53.71 X10*3/UL
NEUTROPHILS # BLD MANUAL: 42.77 X10*3/UL (ref 1.2–7.7)
NEUTS BAND # BLD MANUAL: 0.47 X10*3/UL (ref 0–0.7)
NEUTS BAND NFR BLD MANUAL: 1 %
NEUTS SEG # BLD MANUAL: 42.3 X10*3/UL (ref 1.2–7)
NEUTS SEG NFR BLD MANUAL: 90 %
NRBC BLD-RTO: 0.4 /100 WBCS (ref 0–0)
NRBC BLD-RTO: 0.4 /100 WBCS (ref 0–0)
NUCLEATED RBC, COLLECTION: 0.7 /100 WBCS
PLATELET # BLD AUTO: 109 X10*3/UL (ref 150–450)
PLATELET # BLD AUTO: 57 X10*3/UL (ref 150–450)
PLT, COLLECTION: 638 X10*3/UL
POLYCHROMASIA BLD QL SMEAR: ABNORMAL
POTASSIUM SERPL-SCNC: 3.4 MMOL/L (ref 3.5–5.3)
POTASSIUM SERPL-SCNC: 4 MMOL/L (ref 3.5–5.3)
PROT SERPL-MCNC: 4.9 G/DL (ref 6.4–8.2)
PROT SERPL-MCNC: 6.4 G/DL (ref 6.4–8.2)
RBC # BLD AUTO: 3.63 X10*6/UL (ref 4–5.2)
RBC # BLD AUTO: 4.28 X10*6/UL (ref 4–5.2)
RBC MORPH BLD: ABNORMAL
RBC, COLLECTION: 0.13 X10*6/UL
SODIUM SERPL-SCNC: 140 MMOL/L (ref 136–145)
SODIUM SERPL-SCNC: 142 MMOL/L (ref 136–145)
TOTAL CELLS COUNTED BLD: 100
VIABLE CELLS NFR SPEC: 99.6 %
WBC # BLD AUTO: 47 X10*3/UL (ref 4.4–11.3)
WBC # BLD AUTO: 48.4 X10*3/UL (ref 4.4–11.3)
WBC, COLLECTION: 231.1 X10*3/UL

## 2024-01-16 PROCEDURE — 85025 COMPLETE CBC W/AUTO DIFF WBC: CPT

## 2024-01-16 PROCEDURE — 2500000004 HC RX 250 GENERAL PHARMACY W/ HCPCS (ALT 636 FOR OP/ED): Mod: JZ | Performed by: STUDENT IN AN ORGANIZED HEALTH CARE EDUCATION/TRAINING PROGRAM

## 2024-01-16 PROCEDURE — 96365 THER/PROPH/DIAG IV INF INIT: CPT

## 2024-01-16 PROCEDURE — 36511 APHERESIS WBC: CPT

## 2024-01-16 PROCEDURE — 2500000004 HC RX 250 GENERAL PHARMACY W/ HCPCS (ALT 636 FOR OP/ED): Performed by: STUDENT IN AN ORGANIZED HEALTH CARE EDUCATION/TRAINING PROGRAM

## 2024-01-16 PROCEDURE — 96372 THER/PROPH/DIAG INJ SC/IM: CPT | Mod: 59 | Performed by: STUDENT IN AN ORGANIZED HEALTH CARE EDUCATION/TRAINING PROGRAM

## 2024-01-16 PROCEDURE — 96366 THER/PROPH/DIAG IV INF ADDON: CPT

## 2024-01-16 PROCEDURE — 85027 COMPLETE CBC AUTOMATED: CPT | Mod: 59

## 2024-01-16 PROCEDURE — 2500000005 HC RX 250 GENERAL PHARMACY W/O HCPCS: Performed by: STUDENT IN AN ORGANIZED HEALTH CARE EDUCATION/TRAINING PROGRAM

## 2024-01-16 PROCEDURE — 2500000001 HC RX 250 WO HCPCS SELF ADMINISTERED DRUGS (ALT 637 FOR MEDICARE OP): Performed by: STUDENT IN AN ORGANIZED HEALTH CARE EDUCATION/TRAINING PROGRAM

## 2024-01-16 PROCEDURE — 85007 BL SMEAR W/DIFF WBC COUNT: CPT | Mod: CCI

## 2024-01-16 PROCEDURE — 83735 ASSAY OF MAGNESIUM: CPT

## 2024-01-16 PROCEDURE — 88185 FLOWCYTOMETRY/TC ADD-ON: CPT | Mod: TC

## 2024-01-16 PROCEDURE — 82330 ASSAY OF CALCIUM: CPT

## 2024-01-16 PROCEDURE — 80053 COMPREHEN METABOLIC PANEL: CPT

## 2024-01-16 RX ORDER — ALBUTEROL SULFATE 0.83 MG/ML
3 SOLUTION RESPIRATORY (INHALATION) AS NEEDED
Status: CANCELLED | OUTPATIENT
Start: 2024-01-16

## 2024-01-16 RX ORDER — ALBUTEROL SULFATE 0.83 MG/ML
3 SOLUTION RESPIRATORY (INHALATION) AS NEEDED
OUTPATIENT
Start: 2024-01-17

## 2024-01-16 RX ORDER — ALBUTEROL SULFATE 0.83 MG/ML
3 SOLUTION RESPIRATORY (INHALATION) AS NEEDED
Status: CANCELLED | OUTPATIENT
Start: 2024-01-17

## 2024-01-16 RX ORDER — DIPHENHYDRAMINE HYDROCHLORIDE 50 MG/ML
50 INJECTION INTRAMUSCULAR; INTRAVENOUS AS NEEDED
Status: CANCELLED | OUTPATIENT
Start: 2024-01-17

## 2024-01-16 RX ORDER — POTASSIUM CHLORIDE 20 MEQ/1
40 TABLET, EXTENDED RELEASE ORAL ONCE AS NEEDED
Status: CANCELLED | OUTPATIENT
Start: 2024-01-17

## 2024-01-16 RX ORDER — DIPHENHYDRAMINE HYDROCHLORIDE 50 MG/ML
50 INJECTION INTRAMUSCULAR; INTRAVENOUS AS NEEDED
OUTPATIENT
Start: 2024-01-17

## 2024-01-16 RX ORDER — EPINEPHRINE 0.3 MG/.3ML
0.3 INJECTION SUBCUTANEOUS EVERY 5 MIN PRN
OUTPATIENT
Start: 2024-01-17

## 2024-01-16 RX ORDER — FAMOTIDINE 10 MG/ML
20 INJECTION INTRAVENOUS ONCE AS NEEDED
Status: CANCELLED | OUTPATIENT
Start: 2024-01-16

## 2024-01-16 RX ORDER — MAGNESIUM SULFATE HEPTAHYDRATE 40 MG/ML
2 INJECTION, SOLUTION INTRAVENOUS ONCE AS NEEDED
Status: CANCELLED | OUTPATIENT
Start: 2024-01-17

## 2024-01-16 RX ORDER — CALCIUM CARBONATE 200(500)MG
2 TABLET,CHEWABLE ORAL EVERY 5 MIN PRN
Status: DISCONTINUED | OUTPATIENT
Start: 2024-01-16 | End: 2024-01-16 | Stop reason: HOSPADM

## 2024-01-16 RX ORDER — MAGNESIUM SULFATE HEPTAHYDRATE 40 MG/ML
4 INJECTION, SOLUTION INTRAVENOUS ONCE AS NEEDED
OUTPATIENT
Start: 2024-01-17

## 2024-01-16 RX ORDER — FAMOTIDINE 10 MG/ML
20 INJECTION INTRAVENOUS ONCE AS NEEDED
Status: CANCELLED | OUTPATIENT
Start: 2024-01-17

## 2024-01-16 RX ORDER — DIPHENHYDRAMINE HYDROCHLORIDE 50 MG/ML
50 INJECTION INTRAMUSCULAR; INTRAVENOUS AS NEEDED
Status: CANCELLED | OUTPATIENT
Start: 2024-01-16

## 2024-01-16 RX ORDER — POTASSIUM CHLORIDE 20 MEQ/1
40 TABLET, EXTENDED RELEASE ORAL ONCE AS NEEDED
OUTPATIENT
Start: 2024-01-17

## 2024-01-16 RX ORDER — MAGNESIUM SULFATE HEPTAHYDRATE 40 MG/ML
4 INJECTION, SOLUTION INTRAVENOUS ONCE AS NEEDED
Status: CANCELLED | OUTPATIENT
Start: 2024-01-17

## 2024-01-16 RX ORDER — FAMOTIDINE 10 MG/ML
20 INJECTION INTRAVENOUS ONCE AS NEEDED
OUTPATIENT
Start: 2024-01-17

## 2024-01-16 RX ORDER — LANOLIN ALCOHOL/MO/W.PET/CERES
400 CREAM (GRAM) TOPICAL ONCE AS NEEDED
Status: CANCELLED | OUTPATIENT
Start: 2024-01-17

## 2024-01-16 RX ORDER — EPINEPHRINE 0.3 MG/.3ML
0.3 INJECTION SUBCUTANEOUS EVERY 5 MIN PRN
Status: CANCELLED | OUTPATIENT
Start: 2024-01-16

## 2024-01-16 RX ORDER — EPINEPHRINE 0.3 MG/.3ML
0.3 INJECTION SUBCUTANEOUS EVERY 5 MIN PRN
Status: CANCELLED | OUTPATIENT
Start: 2024-01-17

## 2024-01-16 RX ORDER — LANOLIN ALCOHOL/MO/W.PET/CERES
400 CREAM (GRAM) TOPICAL ONCE AS NEEDED
OUTPATIENT
Start: 2024-01-17

## 2024-01-16 RX ORDER — CALCIUM CARBONATE 200(500)MG
2 TABLET,CHEWABLE ORAL EVERY 5 MIN PRN
Status: CANCELLED | OUTPATIENT
Start: 2024-01-17

## 2024-01-16 RX ORDER — MAGNESIUM SULFATE HEPTAHYDRATE 40 MG/ML
2 INJECTION, SOLUTION INTRAVENOUS ONCE AS NEEDED
OUTPATIENT
Start: 2024-01-17

## 2024-01-16 RX ADMIN — FILGRASTIM-SNDZ 600 MCG: 300 INJECTION, SOLUTION INTRAVENOUS; SUBCUTANEOUS at 09:27

## 2024-01-16 RX ADMIN — CALCIUM CHLORIDE 1.7 G: 100 INJECTION, SOLUTION INTRAVENOUS at 13:59

## 2024-01-16 RX ADMIN — CALCIUM CHLORIDE 1.7 G: 100 INJECTION, SOLUTION INTRAVENOUS at 10:18

## 2024-01-16 RX ADMIN — CALCIUM CARBONATE (ANTACID) CHEW TAB 500 MG 1000 MG: 500 CHEW TAB at 11:58

## 2024-01-16 RX ADMIN — CALCIUM CARBONATE (ANTACID) CHEW TAB 500 MG 1000 MG: 500 CHEW TAB at 11:38

## 2024-01-16 ASSESSMENT — ENCOUNTER SYMPTOMS: BACK PAIN: 1

## 2024-01-16 NOTE — PROGRESS NOTES
Pt arrived to unit alert, oriented and ambulatory, accompanied by armando. Vitals taken and labs drawn via apheresis catheter; okay to use line verified. Report was called to Dr. Ramirez and OK to Proceed given at 0955. Collection began at 1015. IV calcium was initiated at the begining of the collection. At 1135 pt notified RN of perioral numbness and tingling. @ doses of PO tums were given and the rate of IV calcium infusion was increase. Pt remained stable the remainder of the collection with no additional complaints. At the end of collection, vitals were taken and labs were drawn from patient and product. Apheresis catheter was flushed and caps replaced. Post labs were reviewed with patient and instructed to take 40mEq of potassium and 400mg of magnesium. Pt confirmed understanding of all instructions and teaching and has no additional learning needs at this time. At 1700 pt was informed that t no further injections or days of collection were needed. At 1710 IR RN removed trialysis catheter. Dressing is clean and is not bleeding after 30 min. Pt left the unit at 1730 alert, oriented and ambulatory, accompanied by armando.

## 2024-01-16 NOTE — PROGRESS NOTES
Pt arrived via self ambulation with niece. Pt's weight and vitals obtained. Pt received 2 filgrastim injections totaling 600mcg in the back if the right upper arm. Pt tolerated well with no reactions noted.

## 2024-01-16 NOTE — PROGRESS NOTES
Patient ID: Melani Mendieta is a 63 y.o. female.    Subjective    HPI  Presents today for routine follow up visit in [facility] prior to beginning stem cell mobilization as stem cell donor for her brother. She met with Dr. Vyas on Friday 1/5 and was consented for collection.     Review of Systems   Musculoskeletal:  Positive for back pain (Chronic, Radiating to Left Hip).   All other systems reviewed and are negative.      Objective    BSA: 1.67 meters squared  /79 (BP Location: Right arm, Patient Position: Sitting)   Pulse 84   Temp 36.7 °C (98.1 °F) (Skin)   Resp 18   Wt 59.3 kg (130 lb 11.7 oz)   SpO2 97%   BMI 20.48 kg/m²   Vital signs reviewed today.      Physical Exam  Constitutional:       Appearance: Normal appearance.   HENT:      Head: Normocephalic.      Nose: Nose normal.      Mouth/Throat:      Mouth: Mucous membranes are moist.      Pharynx: Oropharynx is clear.   Eyes:      Extraocular Movements: Extraocular movements intact.      Conjunctiva/sclera: Conjunctivae normal.      Pupils: Pupils are equal, round, and reactive to light.   Cardiovascular:      Rate and Rhythm: Normal rate and regular rhythm.      Pulses: Normal pulses.      Heart sounds: Normal heart sounds.   Pulmonary:      Effort: Pulmonary effort is normal.      Breath sounds: Normal breath sounds.   Abdominal:      General: Abdomen is flat. Bowel sounds are normal.      Palpations: Abdomen is soft.   Musculoskeletal:         General: Normal range of motion.      Cervical back: Normal range of motion.   Skin:     General: Skin is warm and dry.      Capillary Refill: Capillary refill takes less than 2 seconds.   Neurological:      General: No focal deficit present.      Mental Status: She is alert and oriented to person, place, and time. Mental status is at baseline.   Psychiatric:         Mood and Affect: Mood normal.       Assessment/Plan        Oncology History    No history exists.        Stem cell  donor  12/12 HLA Match for her brother Alex who admits on 1/10/24 with T0 1/16/24  Consented for Stem Cell Mobilization and Collection on 1/5/24  Start Mobilization with Filgrastim 600 mcg daily beginning 1/11/24. Will be coming in for daily injections.  Central line placement in Radiology 1/15/24  Stem Cell Collection 1/15/24 and if needed on 1/16/24  Reviewed potential side effects of Filgrastim mobilization and collection  She will use Claritin daily to prevent bone pain from Filgrastim; She has tylenol or Tramadol (from previous back surgery) at home if needed for pain.  She will have oral Magnesium and Potassium supplements prescribed and educated to only take if instructed to following collection    RTC  Daily appointments for GCSF Injections beginning 1/11/24  Line placement & Collection 1/15/24  Day 2 Collection if Needed 1/16/24  Tentative Plan for [facility] Post Collection Follow Up 1/17/24 (May need to change date for her work schedule)       Yolanda Davies, JOAQUÍN-CNP

## 2024-01-17 ENCOUNTER — LAB (OUTPATIENT)
Dept: LAB | Facility: HOSPITAL | Age: 64
End: 2024-01-17
Payer: SUBSIDIZED

## 2024-01-17 ENCOUNTER — OFFICE VISIT (OUTPATIENT)
Dept: OTHER | Facility: HOSPITAL | Age: 64
End: 2024-01-17
Payer: SUBSIDIZED

## 2024-01-17 ENCOUNTER — LAB REQUISITION (OUTPATIENT)
Dept: LAB | Facility: HOSPITAL | Age: 64
End: 2024-01-17
Payer: COMMERCIAL

## 2024-01-17 ENCOUNTER — DOCUMENTATION (OUTPATIENT)
Dept: OTHER | Facility: HOSPITAL | Age: 64
End: 2024-01-17

## 2024-01-17 ENCOUNTER — LAB (OUTPATIENT)
Dept: OTHER | Facility: HOSPITAL | Age: 64
End: 2024-01-17
Payer: SUBSIDIZED

## 2024-01-17 ENCOUNTER — DOCUMENTATION (OUTPATIENT)
Dept: HEMATOLOGY/ONCOLOGY | Facility: HOSPITAL | Age: 64
End: 2024-01-17
Payer: COMMERCIAL

## 2024-01-17 VITALS
SYSTOLIC BLOOD PRESSURE: 125 MMHG | RESPIRATION RATE: 18 BRPM | OXYGEN SATURATION: 96 % | HEART RATE: 88 BPM | TEMPERATURE: 96.6 F | DIASTOLIC BLOOD PRESSURE: 78 MMHG | WEIGHT: 132.72 LBS | BODY MASS INDEX: 20.79 KG/M2

## 2024-01-17 DIAGNOSIS — Z52.001 STEM CELL DONOR: Primary | ICD-10-CM

## 2024-01-17 DIAGNOSIS — Z52.001 DONOR OF STEM CELL: ICD-10-CM

## 2024-01-17 DIAGNOSIS — Z52.001 STEM CELL DONOR: ICD-10-CM

## 2024-01-17 LAB
ABO GROUP (TYPE) IN BLOOD: NORMAL
ABO GROUP (TYPE) IN BLOOD: NORMAL
ALBUMIN SERPL BCP-MCNC: 4.3 G/DL (ref 3.4–5)
ALP SERPL-CCNC: 200 U/L (ref 33–136)
ALT SERPL W P-5'-P-CCNC: 12 U/L (ref 7–45)
ANION GAP SERPL CALC-SCNC: 14 MMOL/L (ref 10–20)
AST SERPL W P-5'-P-CCNC: 19 U/L (ref 9–39)
BASOPHILS # BLD MANUAL: 0 X10*3/UL (ref 0–0.1)
BASOPHILS NFR BLD MANUAL: 0 %
BILIRUB SERPL-MCNC: 0.3 MG/DL (ref 0–1.2)
BUN SERPL-MCNC: 7 MG/DL (ref 6–23)
CALCIUM SERPL-MCNC: 9.9 MG/DL (ref 8.6–10.3)
CHLORIDE SERPL-SCNC: 103 MMOL/L (ref 98–107)
CO2 SERPL-SCNC: 31 MMOL/L (ref 21–32)
CREAT SERPL-MCNC: 0.88 MG/DL (ref 0.5–1.05)
DOHLE BOD BLD QL SMEAR: PRESENT
EGFRCR SERPLBLD CKD-EPI 2021: 74 ML/MIN/1.73M*2
EOSINOPHIL # BLD MANUAL: 1.85 X10*3/UL (ref 0–0.7)
EOSINOPHIL NFR BLD MANUAL: 3 %
ERYTHROCYTE [DISTWIDTH] IN BLOOD BY AUTOMATED COUNT: 14.6 % (ref 11.5–14.5)
GLUCOSE SERPL-MCNC: 101 MG/DL (ref 74–99)
HCT VFR BLD AUTO: 39.6 % (ref 36–46)
HGB BLD-MCNC: 13.6 G/DL (ref 12–16)
IMM GRANULOCYTES # BLD AUTO: 4.17 X10*3/UL (ref 0–0.7)
IMM GRANULOCYTES NFR BLD AUTO: 6.8 % (ref 0–0.9)
LYMPHOCYTES # BLD MANUAL: 3.08 X10*3/UL (ref 1.2–4.8)
LYMPHOCYTES NFR BLD MANUAL: 5 %
MAGNESIUM SERPL-MCNC: 1.71 MG/DL (ref 1.6–2.4)
MCH RBC QN AUTO: 32.3 PG (ref 26–34)
MCHC RBC AUTO-ENTMCNC: 34.3 G/DL (ref 32–36)
MCV RBC AUTO: 94 FL (ref 80–100)
MONOCYTES # BLD MANUAL: 1.23 X10*3/UL (ref 0.1–1)
MONOCYTES NFR BLD MANUAL: 2 %
NEUTROPHILS # BLD MANUAL: 55.35 X10*3/UL (ref 1.2–7.7)
NEUTS BAND # BLD MANUAL: 1.23 X10*3/UL (ref 0–0.7)
NEUTS BAND NFR BLD MANUAL: 2 %
NEUTS SEG # BLD MANUAL: 54.12 X10*3/UL (ref 1.2–7)
NEUTS SEG NFR BLD MANUAL: 88 %
NRBC BLD-RTO: 0.4 /100 WBCS (ref 0–0)
PLATELET # BLD AUTO: 70 X10*3/UL (ref 150–450)
POLYCHROMASIA BLD QL SMEAR: ABNORMAL
POTASSIUM SERPL-SCNC: 4.6 MMOL/L (ref 3.5–5.3)
PROT SERPL-MCNC: 6.6 G/DL (ref 6.4–8.2)
RBC # BLD AUTO: 4.21 X10*6/UL (ref 4–5.2)
RBC MORPH BLD: ABNORMAL
RH FACTOR (ANTIGEN D): NORMAL
RH FACTOR (ANTIGEN D): NORMAL
SODIUM SERPL-SCNC: 143 MMOL/L (ref 136–145)
TOTAL CELLS COUNTED BLD: 100
WBC # BLD AUTO: 61.5 X10*3/UL (ref 4.4–11.3)

## 2024-01-17 PROCEDURE — 80053 COMPREHEN METABOLIC PANEL: CPT

## 2024-01-17 PROCEDURE — 36415 COLL VENOUS BLD VENIPUNCTURE: CPT

## 2024-01-17 PROCEDURE — 99214 OFFICE O/P EST MOD 30 MIN: CPT

## 2024-01-17 PROCEDURE — 86900 BLOOD TYPING SEROLOGIC ABO: CPT

## 2024-01-17 PROCEDURE — 85007 BL SMEAR W/DIFF WBC COUNT: CPT

## 2024-01-17 PROCEDURE — 83735 ASSAY OF MAGNESIUM: CPT

## 2024-01-17 PROCEDURE — 87070 CULTURE OTHR SPECIMN AEROBIC: CPT | Mod: OUT | Performed by: INTERNAL MEDICINE

## 2024-01-17 PROCEDURE — 86901 BLOOD TYPING SEROLOGIC RH(D): CPT

## 2024-01-17 PROCEDURE — 85027 COMPLETE CBC AUTOMATED: CPT

## 2024-01-17 ASSESSMENT — ENCOUNTER SYMPTOMS
DYSURIA: 0
LEG SWELLING: 0
FATIGUE: 1
CONSTIPATION: 0
PALPITATIONS: 0
FEVER: 0
APPETITE CHANGE: 0
DIAPHORESIS: 0
NAUSEA: 0
COUGH: 1
VOMITING: 0
UNEXPECTED WEIGHT CHANGE: 0
CHILLS: 0
LIGHT-HEADEDNESS: 0
DIZZINESS: 0
WOUND: 0
DIARRHEA: 0
SHORTNESS OF BREATH: 0

## 2024-01-17 ASSESSMENT — PAIN SCALES - GENERAL: PAINLEVEL: 0-NO PAIN

## 2024-01-17 NOTE — PROGRESS NOTES
Contacted by Ruth Nolan in [facility] lab that WBC 61.5. Pt is a stem cell donor. Secure Chat sent to Dr. Vyas.

## 2024-01-17 NOTE — PROGRESS NOTES
Pt ambulated to SCT unit without assistance, unaccompanied. Pt denies complaints or pain today. Vitals obtained, blood drawn prior to arrival to unit. NP Beab Schmidt and Estefany Warner came to see patient. A copy of lab results provided. Pt ambulated off unit without complaints or assistance.

## 2024-01-17 NOTE — PROGRESS NOTES
"Met pt at her collection FUV with Beba Schmidt NP. Labs and trialysis site checked. Pt c/o fatigue and \"cold like \" symptoms. Told to F/U if SX persist. She was provided a copy of tests/labs and Allogeneic Summary Form. She has my contact information if needed.  "

## 2024-01-17 NOTE — PROGRESS NOTES
Patient ID: Melani Mendieta is a 63 y.o. female.    Response:     Past Medical History:     Past Medical History:   Diagnosis Date    Chest pain, unspecified 2017    Chest pain, unspecified type    Other bursitis of hip, left hip     Bursitis of left hip    Personal history of other diseases of the circulatory system 2017    History of essential hypertension    Personal history of other diseases of the respiratory system 2019    History of acute pharyngitis    Personal history of other mental and behavioral disorders     History of anxiety    Personal history of other specified conditions     History of chest pain    Personal history of other specified conditions     History of flank pain       Surgical History:     Past Surgical History:   Procedure Laterality Date    BACK SURGERY      OTHER SURGICAL HISTORY  2020     section        Family History:     Family History   Problem Relation Name Age of Onset    Hypertension Mother      Heart attack Father      Hypertension Father      Stroke Father      Hypertension Sister      Stroke Sister      Hypertension Brother      Tuberculosis Other GRANDFATHER        Social History:     Social History     Tobacco Use    Smoking status: Every Day     Packs/day: 1.00     Years: 40.00     Additional pack years: 0.00     Total pack years: 40.00     Types: Cigarettes    Smokeless tobacco: Never   Vaping Use    Vaping Use: Never used   Substance Use Topics    Alcohol use: Yes     Comment: occasional    Drug use: Never      -------------------------------------------------------------------------------------------------------  Subjective       HPI    Rachele Mendieta is a 63 year old female who presents to the clinic today following stem cell collection for her brother.    Feels like she is developing cold symptoms.  Reports she is congested, has non-productive cough, sore throat, and is feeling tired.  States that her great granddaughter is sick that  lives with her.  Had covid test done Monday, which was negative.    Central line removed yesterday.  Skin itching from tape to dressing over old central line site.  Discomfort near clavicle where line was.  Reports no bone pain.        Review of Systems   Constitutional:  Positive for fatigue. Negative for appetite change, chills, diaphoresis, fever and unexpected weight change.   Respiratory:  Positive for cough. Negative for shortness of breath.    Cardiovascular:  Negative for chest pain, leg swelling and palpitations.   Gastrointestinal:  Negative for constipation, diarrhea, nausea and vomiting.   Genitourinary:  Negative for dysuria.    Skin:  Negative for rash and wound.   Neurological:  Negative for dizziness and light-headedness.      -------------------------------------------------------------------------------------------------------  Objective     Physical Exam  Vitals reviewed.   Constitutional:       Appearance: Normal appearance.   HENT:      Head: Normocephalic.   Cardiovascular:      Rate and Rhythm: Normal rate and regular rhythm.      Pulses: Normal pulses.      Heart sounds: Normal heart sounds. No murmur heard.     No friction rub. No gallop.   Pulmonary:      Effort: Pulmonary effort is normal. No respiratory distress.      Breath sounds: Wheezing (to all lung fields) present.   Abdominal:      General: Abdomen is flat. Bowel sounds are normal. There is no distension.      Palpations: Abdomen is soft. There is no mass.      Tenderness: There is no abdominal tenderness.   Musculoskeletal:         General: No swelling. Normal range of motion.   Skin:     General: Skin is warm and dry.      Comments: Right trialysis catheter removal site with no eythema, swelling, or drainage.  Slight tenderness on palpation.    Neurological:      General: No focal deficit present.      Mental Status: She is alert and oriented to person, place, and time.   Psychiatric:         Mood and Affect: Mood normal.          Behavior: Behavior normal.     Performance Status:  ECOG Performance Status: 0 fully active  -------------------------------------------------------------------------------------------------------  Assessment/Plan    Problem List Items Addressed This Visit             ICD-10-CM    Stem cell donor - Primary Z52.001     12/12 HLA Match for her brother Alex who admits on 1/10/24 with T0 1/16/24  Consented for Stem Cell Mobilization and Collection on 1/5/24  Start Mobilization with Filgrastim 600 mcg daily beginning 1/11/24. Will be coming in for daily injections.  Central line placement in Radiology 1/15/24  Stem Cell Collection 1/15/24 and if needed on 1/16/24  Reviewed potential side effects of Filgrastim mobilization and collection  She will use Claritin daily to prevent bone pain from Filgrastim; She has tylenol or Tramadol (from previous back surgery) at home if needed for pain.  Was instructed to take potassium and magnesium replacement.  1/17/24:  Potassium 4.6 and magnesium 1.7.  Advised to stop taking potassium and magnesium replacements.  Trialysis line was removed yesterday without issue.  Dressing removed with no erythema, swelling, drainage to site.  Mild tenderness.  Advised patient to keep site covered for 24 hours and to keep site dry for at least 3 day following removal.  To contact with any signs of infection such as redness, drainage, continued tenderness.          Upper Respiratory Infection:  -Melani has new cough, sore throat, and congestion.  Wheezing heard throughout all lung lr.  Melani denies shortness of breath.  Advised to perform at home covid test or reach out to primary care provider.  May take OTC cold medications for symptom management.  Advised to go to ED if having any symptoms of shortness of breath, chest discomfort, or worsening symptoms.      RTC:  No follow up appointments required.  Advised patient to contact with any further questions or concerns related to stem cell  collection process.    -------------------------------------------------------------------------------------------------------  JOAQUÍN Aviles-CNP  [facility]

## 2024-01-18 ENCOUNTER — DOCUMENTATION (OUTPATIENT)
Dept: OTHER | Facility: HOSPITAL | Age: 64
End: 2024-01-18
Payer: COMMERCIAL

## 2024-01-18 NOTE — PROGRESS NOTES
"Called pt to follow up on how she was doing post PBSC collection.  Pt states she was feeling \"good\" and was at work. She has my contact number if needed.  "

## 2024-01-20 NOTE — ASSESSMENT & PLAN NOTE
12/12 HLA Match for her brother Alex who admits on 1/10/24 with T0 1/16/24  Consented for Stem Cell Mobilization and Collection on 1/5/24  Start Mobilization with Filgrastim 600 mcg daily beginning 1/11/24. Will be coming in for daily injections.  Central line placement in Radiology 1/15/24  Stem Cell Collection 1/15/24 and if needed on 1/16/24  Reviewed potential side effects of Filgrastim mobilization and collection  She will use Claritin daily to prevent bone pain from Filgrastim; She has tylenol or Tramadol (from previous back surgery) at home if needed for pain.  Was instructed to take potassium and magnesium replacement.  1/17/24:  Potassium 4.6 and magnesium 1.7.  Advised to stop taking potassium and magnesium replacements.

## 2024-01-24 ENCOUNTER — APPOINTMENT (OUTPATIENT)
Dept: OTHER | Facility: HOSPITAL | Age: 64
End: 2024-01-24
Payer: COMMERCIAL

## 2024-02-02 LAB
BACTERIA BPU CULT: NORMAL
BACTERIA BPU CULT: NORMAL

## 2024-02-21 ENCOUNTER — OFFICE VISIT (OUTPATIENT)
Dept: OBGYN CLINIC | Age: 64
End: 2024-02-21
Payer: COMMERCIAL

## 2024-02-21 VITALS
DIASTOLIC BLOOD PRESSURE: 68 MMHG | WEIGHT: 129 LBS | BODY MASS INDEX: 20.25 KG/M2 | HEIGHT: 67 IN | SYSTOLIC BLOOD PRESSURE: 110 MMHG

## 2024-02-21 DIAGNOSIS — R10.2 PELVIC PAIN: ICD-10-CM

## 2024-02-21 DIAGNOSIS — N89.8 VAGINAL ODOR: ICD-10-CM

## 2024-02-21 DIAGNOSIS — N89.8 VAGINAL ODOR: Primary | ICD-10-CM

## 2024-02-21 PROCEDURE — 99203 OFFICE O/P NEW LOW 30 MIN: CPT | Performed by: OBSTETRICS & GYNECOLOGY

## 2024-02-21 RX ORDER — METRONIDAZOLE 500 MG/1
500 TABLET ORAL 2 TIMES DAILY
Qty: 14 TABLET | Refills: 1 | Status: SHIPPED | OUTPATIENT
Start: 2024-02-21

## 2024-02-21 ASSESSMENT — ENCOUNTER SYMPTOMS
SHORTNESS OF BREATH: 0
ABDOMINAL PAIN: 0
APNEA: 0

## 2024-02-21 NOTE — PROGRESS NOTES
Subjective:      Patient ID:  Marcia Reynaga is a 63 y.o. female with chief complaint of:  Chief Complaint   Patient presents with    Vaginal Odor     Pt states she has a fishy vaginal odor and her urine also has a strong odor        Patient presents today with concerns about strong odor from the vagina that has been about a month.  Patient has had bacterial vaginosis in the past and feels like this symptom is similar.  Patient does admit to changing the use of her hygiene products and this could have been a catalyst.  Patient denies any cramping any bleeding she is status post hysterectomy        Past Medical History:   Diagnosis Date    Cancer (HCC)     Chronic back pain      Past Surgical History:   Procedure Laterality Date    BACK SURGERY      BREAST ENHANCEMENT SURGERY Bilateral 2021     SECTION      COLONOSCOPY      COLONOSCOPY N/A 2021    COLORECTAL CANCER SCREENING with polypectomies performed by Yaya Burgos MD at [facility]    HYSTERECTOMY, TOTAL ABDOMINAL (CERVIX REMOVED)      LAPAROSCOPY      OVARY REMOVAL      SKIN CANCER EXCISION      basal cell skin cancer excised per patient     Family History   Problem Relation Age of Onset    Heart Attack Father     Stroke Father     Prostate Cancer Brother     Cancer Maternal Grandmother     Stroke Maternal Grandmother     Cancer Maternal Grandfather     Diabetes Maternal Grandfather     Kidney Disease Neg Hx      Current Outpatient Medications on File Prior to Visit   Medication Sig Dispense Refill    brompheniramine-pseudoephedrine-DM 2-30-10 MG/5ML syrup Take 5 mLs by mouth 4 times daily as needed for Congestion or Cough (Patient not taking: Reported on 2024) 200 mL 0    predniSONE (DELTASONE) 20 MG tablet Take 3 tabs for 3 days, then 2 tabs for 3 days, then 1 tab for 3 days, then 1/2 tab for 3 days, then stop. (Patient not taking: Reported on 2024) 20 tablet 0     No current facility-administered medications on file

## 2024-02-22 LAB
CLUE CELLS VAG QL WET PREP: ABNORMAL
T VAGINALIS VAG QL WET PREP: ABNORMAL
TRICHOMONAS VAGINALIS SCREEN: NEGATIVE
YEAST VAG QL WET PREP: ABNORMAL

## 2024-02-26 LAB
C TRACH DNA CVX QL NAA+PROBE: NEGATIVE
N GONORRHOEA DNA CERV MUCUS QL NAA+PROBE: NEGATIVE

## 2024-03-04 ENCOUNTER — HOSPITAL ENCOUNTER (OUTPATIENT)
Dept: ULTRASOUND IMAGING | Age: 64
Discharge: HOME | End: 2024-03-06
Attending: OBSTETRICS & GYNECOLOGY
Payer: COMMERCIAL

## 2024-03-04 DIAGNOSIS — R10.2 PELVIC PAIN: ICD-10-CM

## 2024-03-04 PROCEDURE — 76856 US EXAM PELVIC COMPLETE: CPT

## 2024-08-08 ENCOUNTER — APPOINTMENT (OUTPATIENT)
Dept: PRIMARY CARE | Facility: CLINIC | Age: 64
End: 2024-08-08
Payer: COMMERCIAL

## 2024-08-08 VITALS
WEIGHT: 131 LBS | TEMPERATURE: 97.2 F | HEIGHT: 67 IN | SYSTOLIC BLOOD PRESSURE: 131 MMHG | OXYGEN SATURATION: 96 % | RESPIRATION RATE: 18 BRPM | HEART RATE: 66 BPM | DIASTOLIC BLOOD PRESSURE: 85 MMHG | BODY MASS INDEX: 20.56 KG/M2

## 2024-08-08 DIAGNOSIS — R10.9 ABDOMINAL PAIN, UNSPECIFIED ABDOMINAL LOCATION: Primary | ICD-10-CM

## 2024-08-08 DIAGNOSIS — Z12.11 SCREENING FOR COLON CANCER: ICD-10-CM

## 2024-08-08 DIAGNOSIS — K92.1 MELENA: ICD-10-CM

## 2024-08-08 LAB
POC APPEARANCE, URINE: CLEAR
POC BILIRUBIN, URINE: NEGATIVE
POC BLOOD, URINE: NEGATIVE
POC COLOR, URINE: YELLOW
POC GLUCOSE, URINE: NEGATIVE MG/DL
POC HEMOGLOBIN: 13.3 G/DL (ref 12–16)
POC KETONES, URINE: NEGATIVE MG/DL
POC LEUKOCYTES, URINE: NEGATIVE
POC NITRITE,URINE: NEGATIVE
POC PH, URINE: 5.5 PH
POC PROTEIN, URINE: NEGATIVE MG/DL
POC SPECIFIC GRAVITY, URINE: 1.01
POC UROBILINOGEN, URINE: 0.2 EU/DL

## 2024-08-08 RX ORDER — OMEPRAZOLE 40 MG/1
40 CAPSULE, DELAYED RELEASE ORAL
Qty: 90 CAPSULE | Refills: 3 | Status: SHIPPED | OUTPATIENT
Start: 2024-08-08 | End: 2024-09-07

## 2024-08-08 RX ORDER — POLYETHYLENE GLYCOL 3350 17 G/17G
17 POWDER, FOR SOLUTION ORAL DAILY
Qty: 510 G | Refills: 3 | Status: SHIPPED | OUTPATIENT
Start: 2024-08-08

## 2024-08-08 ASSESSMENT — ENCOUNTER SYMPTOMS
FLATUS: 1
BELCHING: 1
ANOREXIA: 0
CONSTIPATION: 1
ARTHRALGIAS: 0
HEADACHES: 0
MYALGIAS: 0
DIARRHEA: 0
HEMATOCHEZIA: 0
FEVER: 0
DYSURIA: 0
VOMITING: 0
NAUSEA: 1
ABDOMINAL PAIN: 1

## 2024-08-08 NOTE — ASSESSMENT & PLAN NOTE
Abdominal Pain  This is a recurrent problem. The current episode started more than 1 month ago. The onset quality is gradual. The problem occurs daily. The problem has been rapidly worsening. The pain is located in the epigastric region. The pain is at a severity of 10/10. The pain is severe. The quality of the pain is aching and sharp. The abdominal pain radiates to the left flank and right flank. Associated symptoms include belching, constipation, flatus, melena and nausea. Pertinent negatives include no anorexia, arthralgias, diarrhea, dysuria, fever, headaches, hematochezia, myalgias or vomiting. The pain is aggravated by certain positions. The pain is relieved by Certain positions. She has tried acetaminophen and antacids for the symptoms. The treatment provided no relief. Her past medical history is significant for PUD.   CHECK   AMYLASE  . LIPASE  ..  CBCWITH DIFF      KUB        .START PPI  CHECK  KUB

## 2024-08-08 NOTE — PATIENT INSTRUCTIONS

## 2024-08-08 NOTE — PROGRESS NOTES
Subjective   Patient ID: Melani Mendieta is a 64 y.o. female who presents for Abdominal Pain.  Abdominal Pain  This is a recurrent problem. The current episode started more than 1 month ago. The onset quality is gradual. The problem occurs daily. The problem has been rapidly worsening. The pain is located in the epigastric region. The pain is at a severity of 10/10. The pain is severe. The quality of the pain is aching and sharp. The abdominal pain radiates to the left flank and right flank. Associated symptoms include belching, constipation, flatus, melena and nausea. Pertinent negatives include no anorexia, arthralgias, diarrhea, dysuria, fever, headaches, hematochezia, myalgias or vomiting. The pain is aggravated by certain positions. The pain is relieved by Certain positions. She has tried acetaminophen and antacids for the symptoms. The treatment provided no relief. Her past medical history is significant for PUD.       Review of Systems   Constitutional:  Negative for fever.   Gastrointestinal:  Positive for abdominal pain, constipation, flatus, melena and nausea. Negative for anorexia, diarrhea, hematochezia and vomiting.   Genitourinary:  Negative for dysuria.   Musculoskeletal:  Negative for arthralgias and myalgias.   Neurological:  Negative for headaches.       Objective   Physical Exam  Vitals: I have reviewed the vitals  General: Well-developed.  In no acute distress.  Eyes:   sclera nonicteric.  Conjunctiva not injected.  No discharge.   HEAD: Normocephalic, atraumatic.  HEENT   Mucous membranes moist.  Posterior oropharynx nonerythematous, no tonsillar exudates.      No cervical lymphadenopathy.  Cardio: Regular rate and rhythm.  No murmur, rub or gallop.  Pulmonary: Lungs clear to auscultation in all fields.  No accessory muscle use.  GI/: Normal active bowel sounds. tender.   EPIGASTRIC  No masses or organomegaly appreciated.  Musculoskeletal: No gross deformities appreciated.  Neuro: Alert,  age-appropriate.  Normal muscle tone.  Moving all extremities.  Skin: No rash, bruises or lesions.   Labs        Assessment/Plan   Problem List Items Addressed This Visit       Abdominal pain - Primary     Abdominal Pain  This is a recurrent problem. The current episode started more than 1 month ago. The onset quality is gradual. The problem occurs daily. The problem has been rapidly worsening. The pain is located in the epigastric region. The pain is at a severity of 10/10. The pain is severe. The quality of the pain is aching and sharp. The abdominal pain radiates to the left flank and right flank. Associated symptoms include belching, constipation, flatus, melena and nausea. Pertinent negatives include no anorexia, arthralgias, diarrhea, dysuria, fever, headaches, hematochezia, myalgias or vomiting. The pain is aggravated by certain positions. The pain is relieved by Certain positions. She has tried acetaminophen and antacids for the symptoms. The treatment provided no relief. Her past medical history is significant for PUD.   CHECK   AMYLASE  . LIPASE  ..  CBCWITH DIFF      KUB        .START PPI  CHECK  KUB          Relevant Medications    omeprazole (PriLOSEC) 40 mg DR capsule    polyethylene glycol (Glycolax, Miralax) 17 gram/dose powder    Other Relevant Orders    POCT UA Automated manually resulted (Completed)    CBC and Auto Differential    Comprehensive Metabolic Panel    Amylase    Lipase    H. Pylori Antigen, Stool    US abdomen complete    Sedimentation Rate     Other Visit Diagnoses       Melena        Relevant Orders    POCT hemoglobin manually resulted    Esophagogastroduodenoscopy (EGD)    Screening for colon cancer        Relevant Orders    Colonoscopy Screening; High Risk Patient         EKG   Sinus arrhythmia  Normal axis  Nonspecific ST segment changes  Poor wave progression   ms   ms  QTc 443 ms

## 2024-08-09 DIAGNOSIS — Z12.11 COLON CANCER SCREENING: ICD-10-CM

## 2024-08-09 RX ORDER — POLYETHYLENE GLYCOL 3350, SODIUM CHLORIDE, SODIUM BICARBONATE, POTASSIUM CHLORIDE 420; 11.2; 5.72; 1.48 G/4L; G/4L; G/4L; G/4L
4000 POWDER, FOR SOLUTION ORAL ONCE
Qty: 4000 ML | Refills: 0 | Status: SHIPPED | OUTPATIENT
Start: 2024-08-09 | End: 2024-08-09

## 2024-08-12 ENCOUNTER — HOSPITAL ENCOUNTER (OUTPATIENT)
Dept: RADIOLOGY | Facility: HOSPITAL | Age: 64
Discharge: HOME | End: 2024-08-12
Payer: COMMERCIAL

## 2024-08-12 ENCOUNTER — LAB (OUTPATIENT)
Dept: LAB | Facility: LAB | Age: 64
End: 2024-08-12
Payer: COMMERCIAL

## 2024-08-12 DIAGNOSIS — R10.9 ABDOMINAL PAIN, UNSPECIFIED ABDOMINAL LOCATION: ICD-10-CM

## 2024-08-12 LAB
ALBUMIN SERPL BCP-MCNC: 4.5 G/DL (ref 3.4–5)
ALP SERPL-CCNC: 42 U/L (ref 33–136)
ALT SERPL W P-5'-P-CCNC: 12 U/L (ref 7–45)
AMYLASE SERPL-CCNC: 31 U/L (ref 29–103)
ANION GAP SERPL CALC-SCNC: 12 MMOL/L (ref 10–20)
AST SERPL W P-5'-P-CCNC: 15 U/L (ref 9–39)
BASOPHILS # BLD AUTO: 0.08 X10*3/UL (ref 0–0.1)
BASOPHILS NFR BLD AUTO: 1.1 %
BILIRUB SERPL-MCNC: 0.5 MG/DL (ref 0–1.2)
BUN SERPL-MCNC: 11 MG/DL (ref 6–23)
CALCIUM SERPL-MCNC: 10 MG/DL (ref 8.6–10.3)
CHLORIDE SERPL-SCNC: 104 MMOL/L (ref 98–107)
CO2 SERPL-SCNC: 29 MMOL/L (ref 21–32)
CREAT SERPL-MCNC: 0.81 MG/DL (ref 0.5–1.05)
EGFRCR SERPLBLD CKD-EPI 2021: 81 ML/MIN/1.73M*2
EOSINOPHIL # BLD AUTO: 0.1 X10*3/UL (ref 0–0.7)
EOSINOPHIL NFR BLD AUTO: 1.3 %
ERYTHROCYTE [DISTWIDTH] IN BLOOD BY AUTOMATED COUNT: 13.4 % (ref 11.5–14.5)
ERYTHROCYTE [SEDIMENTATION RATE] IN BLOOD BY WESTERGREN METHOD: 3 MM/H (ref 0–30)
GLUCOSE SERPL-MCNC: 84 MG/DL (ref 74–99)
HCT VFR BLD AUTO: 42.2 % (ref 36–46)
HGB BLD-MCNC: 14.1 G/DL (ref 12–16)
IMM GRANULOCYTES # BLD AUTO: 0.03 X10*3/UL (ref 0–0.7)
IMM GRANULOCYTES NFR BLD AUTO: 0.4 % (ref 0–0.9)
LIPASE SERPL-CCNC: 36 U/L (ref 9–82)
LYMPHOCYTES # BLD AUTO: 1.92 X10*3/UL (ref 1.2–4.8)
LYMPHOCYTES NFR BLD AUTO: 25.6 %
MCH RBC QN AUTO: 32.4 PG (ref 26–34)
MCHC RBC AUTO-ENTMCNC: 33.4 G/DL (ref 32–36)
MCV RBC AUTO: 97 FL (ref 80–100)
MONOCYTES # BLD AUTO: 0.54 X10*3/UL (ref 0.1–1)
MONOCYTES NFR BLD AUTO: 7.2 %
NEUTROPHILS # BLD AUTO: 4.82 X10*3/UL (ref 1.2–7.7)
NEUTROPHILS NFR BLD AUTO: 64.4 %
NRBC BLD-RTO: 0 /100 WBCS (ref 0–0)
PLATELET # BLD AUTO: 304 X10*3/UL (ref 150–450)
POTASSIUM SERPL-SCNC: 4.5 MMOL/L (ref 3.5–5.3)
PROT SERPL-MCNC: 6.7 G/DL (ref 6.4–8.2)
RBC # BLD AUTO: 4.35 X10*6/UL (ref 4–5.2)
SODIUM SERPL-SCNC: 140 MMOL/L (ref 136–145)
WBC # BLD AUTO: 7.5 X10*3/UL (ref 4.4–11.3)

## 2024-08-12 PROCEDURE — 76700 US EXAM ABDOM COMPLETE: CPT

## 2024-08-12 PROCEDURE — 36415 COLL VENOUS BLD VENIPUNCTURE: CPT

## 2024-08-19 ENCOUNTER — LAB (OUTPATIENT)
Dept: LAB | Facility: LAB | Age: 64
End: 2024-08-19
Payer: COMMERCIAL

## 2024-08-19 DIAGNOSIS — R10.9 ABDOMINAL PAIN, UNSPECIFIED ABDOMINAL LOCATION: ICD-10-CM

## 2024-08-19 PROCEDURE — 87449 NOS EACH ORGANISM AG IA: CPT

## 2024-08-21 LAB — H PYLORI AG STL QL IA: NEGATIVE

## 2024-08-26 ENCOUNTER — ANESTHESIA EVENT (OUTPATIENT)
Dept: GASTROENTEROLOGY | Facility: EXTERNAL LOCATION | Age: 64
End: 2024-08-26

## 2024-09-04 ENCOUNTER — TELEPHONE (OUTPATIENT)
Dept: GASTROENTEROLOGY | Facility: CLINIC | Age: 64
End: 2024-09-04
Payer: COMMERCIAL

## 2024-09-04 NOTE — TELEPHONE ENCOUNTER
Left message for patient. Her secondary insurance is not in network with the endoscopy center.   She will have to be rescheduled for a hospital.  Appointment canceled mychart message sent to patient.

## 2024-09-09 ENCOUNTER — ANESTHESIA (OUTPATIENT)
Dept: GASTROENTEROLOGY | Facility: EXTERNAL LOCATION | Age: 64
End: 2024-09-09

## 2024-09-09 ENCOUNTER — APPOINTMENT (OUTPATIENT)
Dept: GASTROENTEROLOGY | Facility: EXTERNAL LOCATION | Age: 64
End: 2024-09-09
Payer: COMMERCIAL

## 2024-09-09 VITALS
RESPIRATION RATE: 12 BRPM | HEIGHT: 67 IN | TEMPERATURE: 96.8 F | HEART RATE: 74 BPM | BODY MASS INDEX: 20.4 KG/M2 | OXYGEN SATURATION: 98 % | DIASTOLIC BLOOD PRESSURE: 69 MMHG | SYSTOLIC BLOOD PRESSURE: 104 MMHG | WEIGHT: 130 LBS

## 2024-09-09 DIAGNOSIS — Z86.010 HISTORY OF COLON POLYPS: Primary | ICD-10-CM

## 2024-09-09 DIAGNOSIS — Z12.11 SCREENING FOR COLON CANCER: ICD-10-CM

## 2024-09-09 DIAGNOSIS — K92.1 MELENA: ICD-10-CM

## 2024-09-09 RX ORDER — LIDOCAINE HYDROCHLORIDE 20 MG/ML
INJECTION, SOLUTION INFILTRATION; PERINEURAL AS NEEDED
Status: DISCONTINUED | OUTPATIENT
Start: 2024-09-09 | End: 2024-09-09

## 2024-09-09 RX ORDER — SODIUM CHLORIDE 9 MG/ML
20 INJECTION, SOLUTION INTRAVENOUS CONTINUOUS
Status: DISCONTINUED | OUTPATIENT
Start: 2024-09-09 | End: 2024-09-10 | Stop reason: HOSPADM

## 2024-09-09 RX ORDER — PROPOFOL 10 MG/ML
INJECTION, EMULSION INTRAVENOUS AS NEEDED
Status: DISCONTINUED | OUTPATIENT
Start: 2024-09-09 | End: 2024-09-09

## 2024-09-09 RX ORDER — ONDANSETRON HYDROCHLORIDE 2 MG/ML
4 INJECTION, SOLUTION INTRAVENOUS ONCE AS NEEDED
Status: DISCONTINUED | OUTPATIENT
Start: 2024-09-09 | End: 2024-09-10 | Stop reason: HOSPADM

## 2024-09-09 SDOH — HEALTH STABILITY: MENTAL HEALTH: CURRENT SMOKER: 1

## 2024-09-09 ASSESSMENT — PAIN - FUNCTIONAL ASSESSMENT
PAIN_FUNCTIONAL_ASSESSMENT: 0-10

## 2024-09-09 ASSESSMENT — PAIN SCALES - GENERAL
PAIN_LEVEL: 0
PAINLEVEL_OUTOF10: 0 - NO PAIN
PAINLEVEL_OUTOF10: 0 - NO PAIN
PAINLEVEL_OUTOF10: 4
PAINLEVEL_OUTOF10: 0 - NO PAIN

## 2024-09-09 ASSESSMENT — PAIN DESCRIPTION - DESCRIPTORS: DESCRIPTORS: ACHING

## 2024-09-09 ASSESSMENT — COLUMBIA-SUICIDE SEVERITY RATING SCALE - C-SSRS
2. HAVE YOU ACTUALLY HAD ANY THOUGHTS OF KILLING YOURSELF?: NO
1. IN THE PAST MONTH, HAVE YOU WISHED YOU WERE DEAD OR WISHED YOU COULD GO TO SLEEP AND NOT WAKE UP?: NO
6. HAVE YOU EVER DONE ANYTHING, STARTED TO DO ANYTHING, OR PREPARED TO DO ANYTHING TO END YOUR LIFE?: NO

## 2024-09-09 NOTE — H&P
Outpatient Hospital Procedure H&P    Patient Profile-Procedures  Initial Info  Patient Demographics  Name Melani Mendieta  Date of Birth 1960  MRN [id number]  Address   [address] [place] [id number] [place] [address]    Primary Phone Number [phone]  Secondary Phone Number    Wallace Andre    Procedure(s):  EGD, colonoscopy    Primary contact name and number   Extended Emergency Contact Information  Primary Emergency Contact: Anna Bermudez  Address[address] [address] [place], [place] [address] [address] [place] [address] [place] [facility]  Mobile Phone: [phone]  Relation: Estelle    [facility]  Weight   Vitals:    09/09/24 0923   Weight: 59 kg (130 lb)     BMI Body mass index is 20.36 kg/m².    Allergies  Allergies   Allergen Reactions    Codeine GI bleeding       Past Medical History   Past Medical History:   Diagnosis Date    Chest pain, unspecified 01/03/2017    Chest pain, unspecified type    Other bursitis of hip, left hip     Bursitis of left hip    Personal history of other diseases of the circulatory system 01/03/2017    History of essential hypertension    Personal history of other diseases of the respiratory system 08/30/2019    History of acute pharyngitis    Personal history of other mental and behavioral disorders     History of anxiety    Personal history of other specified conditions     History of chest pain    Personal history of other specified conditions     History of flank pain       Provider assessment  Diagnosis: melena, h/o polyps    Medication Reviewed - yes  Prior to Admission medications    Medication Sig Start Date End Date Taking? Authorizing Provider   omeprazole (PriLOSEC) 40 mg DR capsule Take 1 capsule (40 mg) by mouth once daily in the morning. Take before meals. Do not crush or chew. 8/8/24 9/9/24 Yes Wallace Castle,    polyethylene glycol (Glycolax, Miralax) 17 gram/dose powder Take 17 g by mouth once daily. 8/8/24  Yes Wallace HIGHTOWER  DO Tin   busPIRone (Buspar) 10 mg tablet Take 1 tablet (10 mg) by mouth 2 times a day. 5/4/23 6/3/23  Wallace Castle DO   magnesium oxide (Mag-Ox) 400 mg (241.3 mg magnesium) tablet Take 1 tablet (400 mg) by mouth once daily. Take as instructed follow stem cell collection 1/9/24 1/8/25  Javed Vyas MD       Physical Exam  Vitals:    09/09/24 0923   BP: 123/81   Pulse: 76   Resp: 10   Temp: 36.7 °C (98.1 °F)   SpO2: 99%        General: A&Ox3, NAD.  CV: RRR. No murmur.  Resp: CTA bilaterally. No wheezing, rhonchi or rales.   Extrem: No edema.       Oropharyngeal Classification II (hard and soft palate, upper portion of tonsils and uvula visible)  ASA PS Classification 2  Sedation Plan Deep  Procedure Plan - pre-procedural (re)assesment completed by physician:  discharge/transfer patient when discharge criteria met    Cliff Santoyo MD  9/9/2024 9:43 AM

## 2024-09-09 NOTE — DISCHARGE INSTRUCTIONS

## 2024-09-09 NOTE — ANESTHESIA POSTPROCEDURE EVALUATION
Patient: Melani Mendieta    Procedure Summary       Date: 09/09/24 Room / Location: [facility]    Anesthesia Start: 0943 Anesthesia Stop: 1016    Procedures:       EGD      COLONOSCOPY Diagnosis:       Melena      Screening for colon cancer      History of colon polyps    Scheduled Providers: Cliff Santoyo MD Responsible Provider: MENG Jasso    Anesthesia Type: MAC ASA Status: 2            Anesthesia Type: MAC    Vitals Value Taken Time   BP 88/51 09/09/24 1013   Temp 36 °C (96.8 °F) 09/09/24 1013   Pulse 82 09/09/24 1016   Resp 15 09/09/24 1013   SpO2 96 % 09/09/24 1013       Anesthesia Post Evaluation    Patient location during evaluation: PACU  Patient participation: waiting for patient participation  Level of consciousness: responsive to verbal stimuli  Pain score: 0  Pain management: adequate  Airway patency: patent  Cardiovascular status: blood pressure returned to baseline  Respiratory status: acceptable  Hydration status: acceptable  Postoperative Nausea and Vomiting: none      No notable events documented.

## 2024-09-09 NOTE — ANESTHESIA PREPROCEDURE EVALUATION
Patient: Melani Mendieta    Procedure Information       Date/Time: 09/09/24 0930    Scheduled providers: Cliff Santoyo MD    Procedures:       EGD      COLONOSCOPY    Location: [facility]            Relevant Problems   Cardiac   (+) Hyperlipidemia      Neuro   (+) Anxiety   (+) Lumbosacral plexus lesion   (+) Panic anxiety syndrome      /Renal   (+) Acute UTI (urinary tract infection)      Musculoskeletal   (+) Osteoarthritis      HEENT   (+) Acute sinusitis      ID   (+) Acute UTI (urinary tract infection)   (+) Mononucleosis syndrome   (+) Tinea corporis   (+) Tinea incognito   (+) Viral URI      Skin   (+) Cancer of the skin, basal cell   (+) Nummular eczema   (+) Oral lichen planus       Clinical information reviewed:   Tobacco  Allergies  Meds   Med Hx  Surg Hx   Fam Hx  Soc Hx        NPO Detail:  NPO/Void Status  Carbohydrate Drink Given Prior to Surgery? : Y  Date of Last Liquid: 09/02/24  Time of Last Liquid: 0500  Date of Last Solid: 09/07/24  Time of Last Solid: 2000  Last Intake Type: Clear fluids; GI prep  Time of Last Void: 0926         PHYSICAL EXAM    Anesthesia Plan    History of general anesthesia?: yes  History of complications of general anesthesia?: yes    ASA 2     MAC     The patient is a current smoker.  Patient was not previously instructed to abstain from smoking on day of procedure.  Patient smoked on day of procedure.    intravenous induction   Anesthetic plan and risks discussed with patient.

## 2024-09-10 ENCOUNTER — TELEPHONE (OUTPATIENT)
Dept: PRIMARY CARE | Facility: CLINIC | Age: 64
End: 2024-09-10
Payer: COMMERCIAL

## 2024-09-10 NOTE — TELEPHONE ENCOUNTER
----- Message from Wallace Castle sent at 9/10/2024  6:05 AM EDT -----  Schedule recheck appointment to see how doing with medication since EGD and colonoscopy revealed no significant findings need to decide next step  ----- Message -----  From: Cliff Santoyo MD  Sent: 9/9/2024  10:16 AM EDT  To: Wallace Castle, DO

## 2024-09-16 ASSESSMENT — ENCOUNTER SYMPTOMS
ANOREXIA: 0
DYSURIA: 0
VOMITING: 0
FLATUS: 1
BELCHING: 0
MYALGIAS: 0
FREQUENCY: 0
CONSTIPATION: 0
NAUSEA: 0
HEMATURIA: 0
HEMATOCHEZIA: 0
ARTHRALGIAS: 0
ABDOMINAL PAIN: 1
FEVER: 0
HEADACHES: 0
DIARRHEA: 1
WEIGHT LOSS: 0

## 2024-09-17 ENCOUNTER — APPOINTMENT (OUTPATIENT)
Dept: PRIMARY CARE | Facility: CLINIC | Age: 64
End: 2024-09-17
Payer: COMMERCIAL

## 2024-09-17 VITALS
HEART RATE: 73 BPM | BODY MASS INDEX: 20.72 KG/M2 | WEIGHT: 132 LBS | RESPIRATION RATE: 18 BRPM | TEMPERATURE: 97 F | DIASTOLIC BLOOD PRESSURE: 83 MMHG | OXYGEN SATURATION: 98 % | SYSTOLIC BLOOD PRESSURE: 121 MMHG | HEIGHT: 67 IN

## 2024-09-17 DIAGNOSIS — R10.9 ABDOMINAL PAIN, UNSPECIFIED ABDOMINAL LOCATION: ICD-10-CM

## 2024-09-17 DIAGNOSIS — F41.9 ANXIETY: Primary | ICD-10-CM

## 2024-09-17 ASSESSMENT — ENCOUNTER SYMPTOMS
HEMATOCHEZIA: 0
FREQUENCY: 0
HEADACHES: 0
HEMATURIA: 0
NAUSEA: 0
DYSURIA: 0
VOMITING: 0
ANOREXIA: 0
ABDOMINAL PAIN: 1
DIARRHEA: 1
WEIGHT LOSS: 0
BELCHING: 0
CONSTIPATION: 0
MYALGIAS: 0
FEVER: 0
ARTHRALGIAS: 0
FLATUS: 1

## 2024-09-17 NOTE — PATIENT INSTRUCTIONS

## 2024-09-17 NOTE — PROGRESS NOTES
Answers submitted by the patient for this visit:  Abdominal Pain Questionnaire (Submitted on 9/16/2024)  Chief Complaint: Abdominal pain  Chronicity: chronic  Onset: more than 1 month ago  Onset quality: gradual  Frequency: daily  Episode duration: 3 Hours  Progression since onset: unchanged  Pain location: epigastric region  Pain - numeric: 9/10  Pain quality: aching, burning, sharp  Radiates to: does not radiate  anorexia: No  arthralgias: No  belching: No  constipation: No  diarrhea: Yes  dysuria: No  fever: No  flatus: Yes  frequency: No  headaches: No  hematochezia: No  hematuria: No  melena: No  myalgias: No  nausea: No  weight loss: No  vomiting: No  Aggravated by: nothing  Relieved by: recumbency  Diagnostic workup: lower endoscopy

## 2024-09-17 NOTE — ASSESSMENT & PLAN NOTE
Recurrent abdominal pain she is improved with medication that is the med restarted last visit i.e. both omeprazole and also MiraLAX.  Does have a history of lesions.  Will get the general surgery for opinion reviewed ultrasound and x-ray blood work and best bet would be to general surgery may need laparoscopy for lysis of adhesions.  Advised to continue meds for now.

## 2024-09-17 NOTE — PROGRESS NOTES
Subjective   Patient ID: Melani Mendieta is a 64 y.o. female who presents for Follow-up (Review meds/).  Abdominal Pain  This is a chronic problem. The current episode started more than 1 month ago. The onset quality is gradual. The problem occurs daily. The most recent episode lasted 3 hours. The problem has been unchanged. The pain is located in the epigastric region. The pain is at a severity of 9/10. The quality of the pain is aching, burning and sharp. The abdominal pain does not radiate. Associated symptoms include diarrhea and flatus. Pertinent negatives include no anorexia, arthralgias, belching, constipation, dysuria, fever, frequency, headaches, hematochezia, hematuria, melena, myalgias, nausea, vomiting or weight loss. Nothing aggravates the pain. The pain is relieved by Recumbency. Prior diagnostic workup includes lower endoscopy.     8/8/24  this is a recurrent problem. The current episode started more than 1 month ago. The onset quality is gradual. The problem occurs daily. The problem has been better with pill  in am..............  The pain is located in the epigastric region. The pain is at a severity of 5/10.....pills are helping  The pain is s moderate The quality of the pain is aching and sharp. The abdominal pain  doesn't radiates to the left flank and right flank resolved . Associated symptoms include belching, constipation, flatus, denies melena and nausea. Pertinent negatives include no anorexia, arthralgias, diarrhea, dysuria, fever, headaches, hematochezia, myalgias or vomiting. The pain is aggravated by certain positions. The pain is relieved by Certain positions. She has tried acetaminophen and antacids for the symptoms. Miralax and omeprazole have helped  ...  The treatment provided no relief. Her past medical history is significant for PUD.   Review of Systems   Constitutional:  Negative for fever and weight loss.   Gastrointestinal:  Positive for abdominal pain, diarrhea and flatus.  Negative for anorexia, constipation, hematochezia, melena, nausea and vomiting.   Genitourinary:  Negative for dysuria, frequency and hematuria.   Musculoskeletal:  Negative for arthralgias and myalgias.   Neurological:  Negative for headaches.     All other  pertinent  systems reviewed and are negative except  those  mentioned  in HPI   Objective   Physical Exam  Vitals: I have reviewed the vitals  General: Well-developed.  In no acute distress.  Eyes:   sclera nonicteric.  Conjunctiva not injected.  No discharge.   HEAD: Normocephalic, atraumatic.  HEENT   Mucous membranes moist.  Posterior oropharynx nonerythematous, no tonsillar exudates.      No cervical lymphadenopathy.  Cardio: Regular rate and rhythm.  No murmur, rub or gallop.  Pulmonary: Lungs clear to auscultation in all fields.  No accessory muscle use.  GI/: Normal active bowel sounds.  Soft, nontender.  No masses or organomegaly appreciated.  Musculoskeletal: No gross deformities appreciated.  Neuro: Alert, age-appropriate.  Normal muscle tone.  Moving all extremities.  Skin: No rash, bruises or lesions.   Labs    US abdomen complete  Status: Final result     PACS Images     Show images for US abdomen complete  Signed by    Signed Time Phone Pager   Edy Mccord MD 8/13/2024 05:56 [phone]     Exam Information    Status Exam Begun Exam Ended   Final 8/12/2024 14:36 8/12/2024 15:26     Study Result    Narrative & Impression   Interpreted By:  Edy Mccord,   STUDY:  US ABDOMEN COMPLETE;  8/12/2024 3:26 pm      INDICATION:  Signs/Symptoms:ABDOMINAL  PAIN.      COMPARISON:  None.      ACCESSION NUMBER(S):  [id number]      ORDERING CLINICIAN:  MARCIANO JUNIOR      TECHNIQUE:  Multiple images of the abdomen were obtained.      FINDINGS:  LIVER:  The liver measures 14.6 cm and is grossly unremarkable and free of  any focal lesions.          GALLBLADDER:  The gallbladder is nondistended, and demonstrates no evidence of  gallstones, wall  thickening or surrounding fluid. The gallbladder  wall thickness is 0.3 cm. Sonographic Butterfield's sign is negative.          BILE DUCTS:  No evidence of intra or extrahepatic biliary dilatation is  identified; the common bile duct measures 0.5 cm.      PANCREAS:  The visualized pancreas is unremarkable in appearance.      RIGHT KIDNEY:  The right kidney measures 11.8 in length. The renal cortical  echogenicity and thickness are within normal limit.  No  hydronephrosis or renal calculi are seen.      LEFT KIDNEY:  The left kidney measures 11.9 in length. The renal cortical  echogenicity and thickness are within normal limits. No  hydronephrosis or renal calculi are seen.          SPLEEN:  The spleen measures 8.6 and is grossly unremarkable.      URINARY BLADDER:  Not imaged.      PERITONEUM:  There is no free or loculated fluid seen in the abdomen.      ABDOMINAL AORTA AND IVC:  The visualized portions of the aorta and IVC are unremarkable.      IMPRESSION:  1.  Grossly unremarkable ultrasound of the abdomen.      MACRO:  None      Signed by: Edy Mccord 8/13/2024 5:56 AM     [facility]  Outside Information  Results  US pelvis (Order [id number])     US pelvis  Order: [id number]  Impression    Both the uterus and ovaries are surgically absent.  The remainder the  examination is otherwise unremarkable.  Narrative    EXAMINATION:  PELVIC ULTRASOUND    3/4/2024    TECHNIQUE:  Transabdominal ultrasound of pelvis was performed.  Patient declined  endovaginal examination.    COMPARISON:  None    HISTORY:  ORDERING SYSTEM PROVIDED HISTORY: Pelvic pain  TECHNOLOGIST PROVIDED HISTORY:  What reading provider will be dictating this exam?->CRC    FINDINGS:    Measurements:    Uterus: Surgically absent    Endometrial stripe: Not measured.    Right Ovary:Surgically absent    Left Ovary: surgically absent      Ultrasound Findings:    Uterus: Surgically absent    Right Ovary: Surgically absent    Left Ovary:   Surgically absent.    Free Fluid: No evidence of free fluid.  Exam End: 03/04/24 13:47    Specimen Collected: 03/04/24 14:23 Last Resulted: 03/04/24 14:26   Received From: [facility]  Result Received: 04/02/24 06:45    CBC and Auto Differential  Order: [id number]   Status: Final result       Visible to patient: Yes (seen)       Dx: Abdominal pain, unspecified abdominal...    2 Result Notes              Component  Ref Range & Units 1 mo ago  (8/12/24) 8 mo ago  (1/17/24) 8 mo ago  (1/16/24) 8 mo ago  (1/16/24) 8 mo ago  (1/15/24) 8 mo ago  (1/15/24) 8 mo ago  (1/8/24)   WBC  4.4 - 11.3 x10*3/uL 7.5 61.5 High Panic  CM 48.4 High  47.0 High  40.3 High  49.9 High  9.1   nRBC  0.0 - 0.0 /100 WBCs 0.0 0.4 High  0.4 High  0.4 High  0.1 High  0.1 High  0.0   RBC  4.00 - 5.20 x10*6/uL 4.35 4.21 3.63 Low  4.28 3.69 Low  4.17 4.33   Hemoglobin  12.0 - 16.0 g/dL 14.1 13.6 11.7 Low  13.7 12.4 13.3 13.9   Hematocrit  36.0 - 46.0 % 42.2 39.6 33.8 Low  39.8 35.7 Low  39.3 41.0   MCV  80 - 100 fL 97 94 93 93 97 94 95   MCH  26.0 - 34.0 pg 32.4 32.3 32.2 32.0 33.6 31.9 32.1   MCHC  32.0 - 36.0 g/dL 33.4 34.3 34.6 34.4 34.7 33.8 33.9   RDW  11.5 - 14.5 % 13.4 14.6 High  14.6 High  14.6 High  14.6 High  14.5 13.5   Platelets  150 - 450 x10*3/uL 304 70 Low  57 Low  109 Low  103 Low  276 324   Neutrophils %  40.0 - 80.0 % 64.4      61.4   Immature Granulocytes %, Automated  0.0 - 0.9 % 0.4 6.8 High  CM  7.2 High  CM  4.9 High  CM 0.3 CM   Comment: Immature Granulocyte Count (IG) includes promyelocytes, myelocytes and metamyelocytes but does not include bands. Percent differential counts (%) should be interpreted in the context of the absolute cell counts (cells/UL).   Lymphocytes %  13.0 - 44.0 % 25.6      30.6   Monocytes %  2.0 - 10.0 % 7.2      5.4   Eosinophils %  0.0 - 6.0 % 1.3      1.5   Basophils %  0.0 - 2.0 % 1.1      0.8   Neutrophils Absolute  1.20 - 7.70 x10*3/uL 4.82      5.59 CM   Comment: Percent  differential counts (%) should be interpreted in the context of the absolute cell counts (cells/uL).   Immature Granulocytes Absolute, Automated  0.00 - 0.70 x10*3/uL 0.03 4.17 High   3.37 High   2.42 High  0.03   Lymphocytes Absolute  1.20 - 4.80 x10*3/uL 1.92      2.79   Monocytes Absolute  0.10 - 1.00 x10*3/uL 0.54      0.49   Eosinophils Absolute  0.00 - 0.70 x10*3/uL 0.10      0.14   Basophils Absolute  0.00 - 0.10 x10*3/uL 0.08                Assessment/Plan   Problem List Items Addressed This Visit       Abdominal pain     Recurrent abdominal pain she is improved with medication that is the med restarted last visit i.e. both omeprazole and also MiraLAX.  Does have a history of lesions.  Will get the general surgery for opinion reviewed ultrasound and x-ray blood work and best bet would be to general surgery may need laparoscopy for lysis of adhesions.  Advised to continue meds for now.         Anxiety - Primary     Encouraged to take one quarter of Xanax prior to legal issues

## 2024-09-18 ENCOUNTER — TELEPHONE (OUTPATIENT)
Dept: PRIMARY CARE | Facility: CLINIC | Age: 64
End: 2024-09-18
Payer: COMMERCIAL

## 2024-09-23 DIAGNOSIS — F17.200 SMOKER: Primary | ICD-10-CM

## 2024-09-23 RX ORDER — VARENICLINE TARTRATE 0.5 (11)-1
0.5 KIT ORAL 2 TIMES DAILY
Qty: 42 MG | Refills: 0 | Status: SHIPPED | OUTPATIENT
Start: 2024-09-23 | End: 2024-10-14

## 2024-09-23 RX ORDER — VARENICLINE TARTRATE 1 MG/1
1 TABLET, FILM COATED ORAL 2 TIMES DAILY
Qty: 60 TABLET | Refills: 2 | Status: SHIPPED | OUTPATIENT
Start: 2024-09-23

## 2024-09-24 DIAGNOSIS — F17.200 SMOKER: ICD-10-CM

## 2024-09-26 ENCOUNTER — LAB (OUTPATIENT)
Dept: LAB | Facility: LAB | Age: 64
End: 2024-09-26
Payer: SUBSIDIZED

## 2024-09-26 ENCOUNTER — HOSPITAL ENCOUNTER (OUTPATIENT)
Dept: RADIOLOGY | Facility: HOSPITAL | Age: 64
Discharge: HOME | End: 2024-09-26
Payer: COMMERCIAL

## 2024-09-26 ENCOUNTER — APPOINTMENT (OUTPATIENT)
Dept: SURGERY | Facility: CLINIC | Age: 64
End: 2024-09-26
Payer: COMMERCIAL

## 2024-09-26 VITALS
BODY MASS INDEX: 20.72 KG/M2 | HEIGHT: 67 IN | DIASTOLIC BLOOD PRESSURE: 70 MMHG | SYSTOLIC BLOOD PRESSURE: 124 MMHG | WEIGHT: 132 LBS

## 2024-09-26 DIAGNOSIS — R10.9 UNSPECIFIED ABDOMINAL PAIN: ICD-10-CM

## 2024-09-26 DIAGNOSIS — R10.9 ABDOMINAL PAIN, UNSPECIFIED ABDOMINAL LOCATION: ICD-10-CM

## 2024-09-26 LAB
ANION GAP SERPL CALC-SCNC: 13 MMOL/L (ref 10–20)
BUN SERPL-MCNC: 13 MG/DL (ref 6–23)
CALCIUM SERPL-MCNC: 10.3 MG/DL (ref 8.6–10.3)
CHLORIDE SERPL-SCNC: 102 MMOL/L (ref 98–107)
CO2 SERPL-SCNC: 29 MMOL/L (ref 21–32)
CREAT SERPL-MCNC: 0.96 MG/DL (ref 0.5–1.05)
EGFRCR SERPLBLD CKD-EPI 2021: 66 ML/MIN/1.73M*2
GLUCOSE SERPL-MCNC: 93 MG/DL (ref 74–99)
POTASSIUM SERPL-SCNC: 4.4 MMOL/L (ref 3.5–5.3)
SODIUM SERPL-SCNC: 140 MMOL/L (ref 136–145)

## 2024-09-26 PROCEDURE — 2550000001 HC RX 255 CONTRASTS: Performed by: SURGERY

## 2024-09-26 PROCEDURE — 74177 CT ABD & PELVIS W/CONTRAST: CPT

## 2024-09-26 NOTE — PROGRESS NOTES
Subjective   Patient ID: Melani Mendieta is a 64 y.o. female who presents for No chief complaint on file..  HPI  64-year-old female patient referred to me for abdominal pain; she underwent EGD and colonoscopy on September 9, 2024 by Dr. Santoyo and findings were normal.  In August 2024, H. pylori, CMP, amylase and lipase were normal.  Ultrasound on August 13 showed no gallstones, 5 mm common bile duct, no gallbladder wall thickening or pericholecystic fluid.  For the past 5 months, she has been having epigastric pain; pain is sharp, throbbing, 10 out of 10 and usually after meal with no radiation. Pain more noticeable with milk and acidic food.  The pain is usually followed by non-bloody mushy bowel movements.  Denies nausea, vomiting, fevers and chills.  Denies family history of inflammatory bowel disease. Smokes one pack of cigarette daily; she has been going through divorce for the past 2 years and has been very stressful for her.    Objective   Physical Exam  Constitutional: no acute distress, well appearing and well nourished  Eyes: conjunctiva and lids with no erythema, swelling or discharge; EOMI  Ears, Nose, Mouth and Throat: external inspection of ears and nose are normal; Pulmonary: normal respiratory effort; clear to auscultation bilaterally, no wheezes or bronchi   Cardiovascular: regular rate and rhythm, no murmurs or extra-heart sounds; pedal pulses are normal; no extremities edema or varicosities, no peripheral edema  Abdomen: soft, non-tender, non-distended; no organomegaly; no peritoneal sign, no hernia  Musculoskeletal: digits and nails normal without clubbing or cyanosis; Joints, bones and muscles are normal with normal range of motion; muscle strength/tone is normal  Skin: normal without rashes or lesion  Neurologic: cranial nerve II-XII intact grossly; normal gait  Psychiatric: oriented to person, place and time  Assessment/Plan   64-year-old female patient with usually postprandial epigastric  abdominal pain, followed by nonbloody mushy stools.  Episode noticeable with acidic and diary foods. She had a normal EGD and colonoscopy in September 9.  Normal gallbladder ultrasound in August. CMP, amylase and lipase were normal in August. Based on the location and association of pain with meal, I will obtain HIDA scan to rule out biliary disease. I will also obtain CT abdomen/pelvis to rule out other intra-abdominal processes.  Further recommendation after the CT scan is done.  All questions answered.       Ranulfo Knowles MD 09/26/24 8:26 AM     10/15/2024  I called and I spoke with patient.  We discussed her HIDA scan which showed gallbladder ejection fraction at 95%.  I told her this is consistent with hypercontractile gallbladder.  We talked about outcome after cholecystectomy which is the pain may or may not resolve.  She is consented for robotic cholecystectomy, possible laparoscopic.  I explained to her the procedure, the risks and complications which include but not limited to bleeding, infection, bowel injury, common bile duct injury and injury to surrounding structures.  All questions answered and willing to proceed.

## 2024-09-30 RX ORDER — VARENICLINE TARTRATE 0.5 (11)-1
KIT ORAL
Qty: 53 EACH | Refills: 0 | Status: SHIPPED | OUTPATIENT
Start: 2024-09-30

## 2024-10-11 ENCOUNTER — HOSPITAL ENCOUNTER (OUTPATIENT)
Dept: RADIOLOGY | Facility: HOSPITAL | Age: 64
Discharge: HOME | End: 2024-10-11
Payer: COMMERCIAL

## 2024-10-11 DIAGNOSIS — R10.9 ABDOMINAL PAIN, UNSPECIFIED ABDOMINAL LOCATION: ICD-10-CM

## 2024-10-11 PROCEDURE — 2500000004 HC RX 250 GENERAL PHARMACY W/ HCPCS (ALT 636 FOR OP/ED): Performed by: SURGERY

## 2024-10-11 PROCEDURE — A9537 TC99M MEBROFENIN: HCPCS | Performed by: SURGERY

## 2024-10-11 PROCEDURE — 78227 HEPATOBIL SYST IMAGE W/DRUG: CPT

## 2024-10-11 PROCEDURE — 3430000001 HC RX 343 DIAGNOSTIC RADIOPHARMACEUTICALS: Performed by: SURGERY

## 2024-10-11 RX ORDER — KIT FOR THE PREPARATION OF TECHNETIUM TC 99M MEBROFENIN 45 MG/10ML
5.6 INJECTION, POWDER, LYOPHILIZED, FOR SOLUTION INTRAVENOUS
Status: COMPLETED | OUTPATIENT
Start: 2024-10-11 | End: 2024-10-11

## 2024-10-11 RX ORDER — SINCALIDE 5 UG/5ML
1.2 INJECTION, POWDER, LYOPHILIZED, FOR SOLUTION INTRAVENOUS ONCE
Status: COMPLETED | OUTPATIENT
Start: 2024-10-11 | End: 2024-10-11

## 2024-10-11 NOTE — PROGRESS NOTES
I called and I spoke with patient.  We discussed her HIDA scan which showed gallbladder ejection fraction at 95%.  I told her this is consistent with hypercontractile gallbladder.  We talked about outcome after cholecystectomy which is the pain may or may not resolve.  Patient will think about this prior to making a decision.

## 2024-10-17 PROBLEM — K82.9 GALLBLADDER ATTACK: Status: ACTIVE | Noted: 2024-09-26

## 2024-11-04 NOTE — TELEPHONE ENCOUNTER
----- Message from Max Michelle sent at 9/15/2022 11:18 AM EDT -----  Subject: Referral Request    Reason for referral request? Patient received a letter to have annual lung   care screening,   Provider patient wants to be referred to(if known):     Provider Phone Number(if known):     Additional Information for Provider?   ---------------------------------------------------------------------------  --------------  [id number] [employer]    [phone]; OK to leave message on voicemail  ---------------------------------------------------------------------------  -------------- Telemetry Shift Summary     Rhythm: SR  Rate: 60s-70s  Measurements: 0.16/0.08/0.38  Ectopy (reported by Monitor Tech): rare PVC/PAC     Normal Values  Rhythm: Sinus  HR:   Measurements: 0.12-0.20/0.06-0.10/0.30-0.52

## 2024-11-08 NOTE — PREPROCEDURE INSTRUCTIONS

## 2024-11-12 ENCOUNTER — HOSPITAL ENCOUNTER (OUTPATIENT)
Facility: HOSPITAL | Age: 64
Setting detail: OUTPATIENT SURGERY
Discharge: HOME | End: 2024-11-12
Attending: SURGERY | Admitting: SURGERY
Payer: COMMERCIAL

## 2024-11-12 ENCOUNTER — ANESTHESIA (OUTPATIENT)
Dept: OPERATING ROOM | Facility: HOSPITAL | Age: 64
End: 2024-11-12
Payer: COMMERCIAL

## 2024-11-12 ENCOUNTER — ANESTHESIA EVENT (OUTPATIENT)
Dept: OPERATING ROOM | Facility: HOSPITAL | Age: 64
End: 2024-11-12
Payer: COMMERCIAL

## 2024-11-12 VITALS
HEART RATE: 75 BPM | RESPIRATION RATE: 16 BRPM | BODY MASS INDEX: 20.69 KG/M2 | TEMPERATURE: 97.3 F | SYSTOLIC BLOOD PRESSURE: 161 MMHG | OXYGEN SATURATION: 97 % | HEIGHT: 67 IN | WEIGHT: 131.84 LBS | DIASTOLIC BLOOD PRESSURE: 80 MMHG

## 2024-11-12 DIAGNOSIS — K82.9 GALLBLADDER ATTACK: Primary | ICD-10-CM

## 2024-11-12 DIAGNOSIS — Z90.49 S/P CHOLECYSTECTOMY: ICD-10-CM

## 2024-11-12 PROBLEM — K21.9 GASTROESOPHAGEAL REFLUX DISEASE: Status: ACTIVE | Noted: 2024-11-12

## 2024-11-12 PROCEDURE — 7100000009 HC PHASE TWO TIME - INITIAL BASE CHARGE: Performed by: SURGERY

## 2024-11-12 PROCEDURE — 3600000018 HC OR TIME - INITIAL BASE CHARGE - PROCEDURE LEVEL SIX: Performed by: SURGERY

## 2024-11-12 PROCEDURE — 96372 THER/PROPH/DIAG INJ SC/IM: CPT | Mod: 59 | Performed by: SURGERY

## 2024-11-12 PROCEDURE — 2780000003 HC OR 278 NO HCPCS: Performed by: SURGERY

## 2024-11-12 PROCEDURE — 88304 TISSUE EXAM BY PATHOLOGIST: CPT | Mod: TC,ELYLAB | Performed by: SURGERY

## 2024-11-12 PROCEDURE — 2500000001 HC RX 250 WO HCPCS SELF ADMINISTERED DRUGS (ALT 637 FOR MEDICARE OP): Performed by: STUDENT IN AN ORGANIZED HEALTH CARE EDUCATION/TRAINING PROGRAM

## 2024-11-12 PROCEDURE — 2720000007 HC OR 272 NO HCPCS: Performed by: SURGERY

## 2024-11-12 PROCEDURE — 7100000002 HC RECOVERY ROOM TIME - EACH INCREMENTAL 1 MINUTE: Performed by: SURGERY

## 2024-11-12 PROCEDURE — 2500000005 HC RX 250 GENERAL PHARMACY W/O HCPCS: Performed by: SURGERY

## 2024-11-12 PROCEDURE — 2500000004 HC RX 250 GENERAL PHARMACY W/ HCPCS (ALT 636 FOR OP/ED): Mod: JZ | Performed by: SURGERY

## 2024-11-12 PROCEDURE — 47562 LAPAROSCOPIC CHOLECYSTECTOMY: CPT | Performed by: SURGERY

## 2024-11-12 PROCEDURE — 3700000002 HC GENERAL ANESTHESIA TIME - EACH INCREMENTAL 1 MINUTE: Performed by: SURGERY

## 2024-11-12 PROCEDURE — 7100000010 HC PHASE TWO TIME - EACH INCREMENTAL 1 MINUTE: Performed by: SURGERY

## 2024-11-12 PROCEDURE — 2500000004 HC RX 250 GENERAL PHARMACY W/ HCPCS (ALT 636 FOR OP/ED): Performed by: SURGERY

## 2024-11-12 PROCEDURE — 47562 LAPAROSCOPIC CHOLECYSTECTOMY: CPT

## 2024-11-12 PROCEDURE — 3700000001 HC GENERAL ANESTHESIA TIME - INITIAL BASE CHARGE: Performed by: SURGERY

## 2024-11-12 PROCEDURE — 3600000017 HC OR TIME - EACH INCREMENTAL 1 MINUTE - PROCEDURE LEVEL SIX: Performed by: SURGERY

## 2024-11-12 PROCEDURE — 2500000004 HC RX 250 GENERAL PHARMACY W/ HCPCS (ALT 636 FOR OP/ED): Performed by: STUDENT IN AN ORGANIZED HEALTH CARE EDUCATION/TRAINING PROGRAM

## 2024-11-12 PROCEDURE — 2500000004 HC RX 250 GENERAL PHARMACY W/ HCPCS (ALT 636 FOR OP/ED): Performed by: NURSE ANESTHETIST, CERTIFIED REGISTERED

## 2024-11-12 PROCEDURE — 2500000004 HC RX 250 GENERAL PHARMACY W/ HCPCS (ALT 636 FOR OP/ED): Performed by: REGISTERED NURSE

## 2024-11-12 PROCEDURE — 7100000001 HC RECOVERY ROOM TIME - INITIAL BASE CHARGE: Performed by: SURGERY

## 2024-11-12 RX ORDER — MEPERIDINE HYDROCHLORIDE 25 MG/ML
12.5 INJECTION INTRAMUSCULAR; INTRAVENOUS; SUBCUTANEOUS EVERY 10 MIN PRN
Status: DISCONTINUED | OUTPATIENT
Start: 2024-11-12 | End: 2024-11-12 | Stop reason: HOSPADM

## 2024-11-12 RX ORDER — BUPIVACAINE HCL/EPINEPHRINE 0.25-.0005
VIAL (ML) INJECTION AS NEEDED
Status: DISCONTINUED | OUTPATIENT
Start: 2024-11-12 | End: 2024-11-12 | Stop reason: HOSPADM

## 2024-11-12 RX ORDER — ALBUTEROL SULFATE 0.83 MG/ML
2.5 SOLUTION RESPIRATORY (INHALATION) ONCE AS NEEDED
Status: DISCONTINUED | OUTPATIENT
Start: 2024-11-12 | End: 2024-11-12 | Stop reason: HOSPADM

## 2024-11-12 RX ORDER — ONDANSETRON HYDROCHLORIDE 2 MG/ML
4 INJECTION, SOLUTION INTRAVENOUS ONCE AS NEEDED
Status: DISCONTINUED | OUTPATIENT
Start: 2024-11-12 | End: 2024-11-12 | Stop reason: HOSPADM

## 2024-11-12 RX ORDER — LIDOCAINE HYDROCHLORIDE 20 MG/ML
INJECTION, SOLUTION INFILTRATION; PERINEURAL AS NEEDED
Status: DISCONTINUED | OUTPATIENT
Start: 2024-11-12 | End: 2024-11-12

## 2024-11-12 RX ORDER — PROPOFOL 10 MG/ML
INJECTION, EMULSION INTRAVENOUS AS NEEDED
Status: DISCONTINUED | OUTPATIENT
Start: 2024-11-12 | End: 2024-11-12

## 2024-11-12 RX ORDER — ROCURONIUM BROMIDE 10 MG/ML
INJECTION, SOLUTION INTRAVENOUS AS NEEDED
Status: DISCONTINUED | OUTPATIENT
Start: 2024-11-12 | End: 2024-11-12

## 2024-11-12 RX ORDER — KETOROLAC TROMETHAMINE 30 MG/ML
INJECTION, SOLUTION INTRAMUSCULAR; INTRAVENOUS AS NEEDED
Status: DISCONTINUED | OUTPATIENT
Start: 2024-11-12 | End: 2024-11-12

## 2024-11-12 RX ORDER — SODIUM CHLORIDE 0.9 G/100ML
IRRIGANT IRRIGATION AS NEEDED
Status: DISCONTINUED | OUTPATIENT
Start: 2024-11-12 | End: 2024-11-12 | Stop reason: HOSPADM

## 2024-11-12 RX ORDER — OXYCODONE HYDROCHLORIDE 5 MG/1
10 TABLET ORAL EVERY 4 HOURS PRN
Status: DISCONTINUED | OUTPATIENT
Start: 2024-11-12 | End: 2024-11-12 | Stop reason: HOSPADM

## 2024-11-12 RX ORDER — SODIUM CHLORIDE, SODIUM LACTATE, POTASSIUM CHLORIDE, CALCIUM CHLORIDE 600; 310; 30; 20 MG/100ML; MG/100ML; MG/100ML; MG/100ML
100 INJECTION, SOLUTION INTRAVENOUS CONTINUOUS
Status: DISCONTINUED | OUTPATIENT
Start: 2024-11-12 | End: 2024-11-12 | Stop reason: HOSPADM

## 2024-11-12 RX ORDER — HYDROCODONE BITARTRATE AND ACETAMINOPHEN 5; 325 MG/1; MG/1
1 TABLET ORAL EVERY 6 HOURS PRN
Qty: 20 TABLET | Refills: 0 | Status: SHIPPED | OUTPATIENT
Start: 2024-11-12

## 2024-11-12 RX ORDER — OXYCODONE HYDROCHLORIDE 5 MG/1
5 TABLET ORAL EVERY 4 HOURS PRN
Status: DISCONTINUED | OUTPATIENT
Start: 2024-11-12 | End: 2024-11-12 | Stop reason: HOSPADM

## 2024-11-12 RX ORDER — SODIUM CHLORIDE, SODIUM LACTATE, POTASSIUM CHLORIDE, CALCIUM CHLORIDE 600; 310; 30; 20 MG/100ML; MG/100ML; MG/100ML; MG/100ML
INJECTION, SOLUTION INTRAVENOUS CONTINUOUS PRN
Status: DISCONTINUED | OUTPATIENT
Start: 2024-11-12 | End: 2024-11-12

## 2024-11-12 RX ORDER — MIDAZOLAM HYDROCHLORIDE 1 MG/ML
INJECTION, SOLUTION INTRAMUSCULAR; INTRAVENOUS AS NEEDED
Status: DISCONTINUED | OUTPATIENT
Start: 2024-11-12 | End: 2024-11-12

## 2024-11-12 RX ORDER — CEFAZOLIN SODIUM 2 G/100ML
2 INJECTION, SOLUTION INTRAVENOUS ONCE
Status: COMPLETED | OUTPATIENT
Start: 2024-11-12 | End: 2024-11-12

## 2024-11-12 RX ORDER — HYDRALAZINE HYDROCHLORIDE 20 MG/ML
5 INJECTION INTRAMUSCULAR; INTRAVENOUS EVERY 30 MIN PRN
Status: DISCONTINUED | OUTPATIENT
Start: 2024-11-12 | End: 2024-11-12 | Stop reason: HOSPADM

## 2024-11-12 RX ORDER — FENTANYL CITRATE 50 UG/ML
25 INJECTION, SOLUTION INTRAMUSCULAR; INTRAVENOUS EVERY 5 MIN PRN
Status: DISCONTINUED | OUTPATIENT
Start: 2024-11-12 | End: 2024-11-12 | Stop reason: HOSPADM

## 2024-11-12 RX ORDER — DIPHENHYDRAMINE HYDROCHLORIDE 50 MG/ML
12.5 INJECTION INTRAMUSCULAR; INTRAVENOUS ONCE AS NEEDED
Status: DISCONTINUED | OUTPATIENT
Start: 2024-11-12 | End: 2024-11-12 | Stop reason: HOSPADM

## 2024-11-12 RX ORDER — LABETALOL HYDROCHLORIDE 5 MG/ML
5 INJECTION, SOLUTION INTRAVENOUS ONCE AS NEEDED
Status: DISCONTINUED | OUTPATIENT
Start: 2024-11-12 | End: 2024-11-12 | Stop reason: HOSPADM

## 2024-11-12 RX ORDER — ONDANSETRON HYDROCHLORIDE 2 MG/ML
INJECTION, SOLUTION INTRAVENOUS AS NEEDED
Status: DISCONTINUED | OUTPATIENT
Start: 2024-11-12 | End: 2024-11-12

## 2024-11-12 RX ORDER — HEPARIN SODIUM 5000 [USP'U]/ML
5000 INJECTION, SOLUTION INTRAVENOUS; SUBCUTANEOUS ONCE
Status: COMPLETED | OUTPATIENT
Start: 2024-11-12 | End: 2024-11-12

## 2024-11-12 RX ORDER — DOCUSATE SODIUM 100 MG/1
100 CAPSULE, LIQUID FILLED ORAL 2 TIMES DAILY
Qty: 20 CAPSULE | Refills: 1 | Status: SHIPPED | OUTPATIENT
Start: 2024-11-12

## 2024-11-12 RX ORDER — FENTANYL CITRATE 50 UG/ML
INJECTION, SOLUTION INTRAMUSCULAR; INTRAVENOUS AS NEEDED
Status: DISCONTINUED | OUTPATIENT
Start: 2024-11-12 | End: 2024-11-12

## 2024-11-12 SDOH — HEALTH STABILITY: MENTAL HEALTH: CURRENT SMOKER: 1

## 2024-11-12 ASSESSMENT — PAIN SCALES - GENERAL
PAINLEVEL_OUTOF10: 6
PAINLEVEL_OUTOF10: 7
PAINLEVEL_OUTOF10: 8
PAINLEVEL_OUTOF10: 7
PAINLEVEL_OUTOF10: 5 - MODERATE PAIN
PAINLEVEL_OUTOF10: 7
PAINLEVEL_OUTOF10: 7
PAINLEVEL_OUTOF10: 8
PAINLEVEL_OUTOF10: 6
PAIN_LEVEL: 1

## 2024-11-12 ASSESSMENT — PAIN DESCRIPTION - DESCRIPTORS: DESCRIPTORS: ACHING

## 2024-11-12 ASSESSMENT — PAIN - FUNCTIONAL ASSESSMENT
PAIN_FUNCTIONAL_ASSESSMENT: 0-10

## 2024-11-12 ASSESSMENT — COLUMBIA-SUICIDE SEVERITY RATING SCALE - C-SSRS
2. HAVE YOU ACTUALLY HAD ANY THOUGHTS OF KILLING YOURSELF?: NO
6. HAVE YOU EVER DONE ANYTHING, STARTED TO DO ANYTHING, OR PREPARED TO DO ANYTHING TO END YOUR LIFE?: NO
1. IN THE PAST MONTH, HAVE YOU WISHED YOU WERE DEAD OR WISHED YOU COULD GO TO SLEEP AND NOT WAKE UP?: NO

## 2024-11-12 ASSESSMENT — PAIN DESCRIPTION - LOCATION
LOCATION: ABDOMEN

## 2024-11-12 NOTE — ANESTHESIA PROCEDURE NOTES
Airway  Date/Time: 11/12/2024 1:55 PM  Urgency: elective    Airway not difficult    Staffing  Performed: resident   Authorized by: Sae Patel DO    Performed by: JOAQUÍN Marshall-ARIAN  Patient location during procedure: OR    Indications and Patient Condition  Indications for airway management: anesthesia  Spontaneous ventilation: present  Sedation level: deep  Preoxygenated: yes  Patient position: sniffing  MILS maintained throughout  Mask difficulty assessment: 2 - vent by mask + OA or adjuvant +/- NMBA    Final Airway Details  Final airway type: endotracheal airway      Successful airway: ETT  Cuffed: yes   Successful intubation technique: video laryngoscopy  Facilitating devices/methods: intubating stylet  Blade: Jaja  Blade size: #3  ETT size (mm): 7.0  Cormack-Lehane Classification: grade I - full view of glottis  Placement verified by: capnometry   Measured from: lips  ETT to lips (cm): 21  Number of attempts at approach: 1    Additional Comments  Intubated by Prince Bauer RN, NP Student. [facility] 3

## 2024-11-12 NOTE — OP NOTE
Cholecystectomy Robot-Assisted Operative Note     Date: 2024  OR Location: [facility] OR    Name: Melani Mendieta, : 1960, Age: 64 y.o., MRN: [id number], Sex: female    Diagnosis  Pre-op Diagnosis      * Gallbladder attack [K82.9] Post-op Diagnosis     * Gallbladder attack [K82.9]     Procedures  Cholecystectomy Robot-Assisted  [id number] - [place] LAPAROSCOPY SURG CHOLECYSTECTOMY      Surgeons      * Ranulfo Knowles - Primary      Task Performed by [facility] First Assist or Physician Assistant:   Tameka Anand--A/NP, was necessary to assist on this case due to the nature of the case and difficulty. During the case. She served as my assist by exchanging robotic instruments     Staff:   Circulator: Mera Sibley Person: Isabel    Anesthesia Staff: Anesthesiologist: Sae Patel DO  CRNA: Anyi Cook, APRN-CRNA; Orlando Lewis APRN-CRNA    Procedure Summary  Anesthesia: General  ASA: II  Estimated Blood Loss: minimal   Intra-op Medications:   Administrations occurring from 1200 to 1345 on 24:   Medication Name Total Dose   LR infusion Cannot be calculated   midazolam (Versed) 1 mg/1 mL 2 mg              Anesthesia Record               Intraprocedure I/O Totals       None           Specimen:   ID Type Source Tests Collected by Time   1 : Gallbladder Tissue GALLBLADDER CHOLECYSTECTOMY SURGICAL PATHOLOGY EXAM Ranulfo Knowles MD 2024 1447          Findings: Large, distended gallbladder; adhesions in lower midline incision     Indications: Melani Mendieta is an 64 y.o. female who is having surgery for Gallbladder attack [K82.9]. Ultrasound showed no gallstone, normal LFTs but EF on HIDA at 95%. She was consented for robotic cholecystectomy, possible laparoscopic.     The patient was seen in the preoperative area. The risks, benefits, complications, treatment options, non-operative alternatives, expected recovery and outcomes were discussed with the patient. The possibilities of reaction to medication,  pulmonary aspiration, bleeding, bowel injury, CBD injury, injury to surrounding structures, the need for additional procedures, failure to diagnose a condition, and creating a complication requiring transfusion or operation were discussed with the patient. The patient concurred with the proposed plan, giving informed consent. The patient has been actively warmed in preoperative area. She got Ancef and 5000 units of subQ heparin.  All questions were answered and willing to proceed.      DETAILS OF PROCEDURE:    After informed consent was obtained, the patient was brought into the operating room and placed in supine position.  Huddle and time-out were performed.  General anesthesia was obtained without difficulty. The arms were tucked and padded. The abdomen was prepped and draped in a normal sterile fashion using ChloraPrep.  She got 2 g of Ancef and 5000 units of subQ haparin. Healed lower midline incision. A stab incision was made in the LUQ.  A Veress needle was placed. Pneumoperitoneum was obtained with CO2 at 15 mmHg.  8 mm bladeless robotic trocar was placed slightly supra-umbilical region about 20-21 cm from the gallbladder. I placed additional three 8 mm robotic trocars in linear fashion to the first trocar fist distance.  The patient was placed in the reverse Trendelenburg with the right side up. The gallbladder was large and distended. The robot was docked: arm #4 with hook cautery, #3 with 30 degree camera, #2 with fenestrated bipolar and #1 with cadiere for retraction. The gallbladder was retracted at the fundus superiorly and cephalad. The peritoneal was scored. The cystic duct and cystic artery were dissected circumferentially.  Critical view of safety was identified.  Cystic artery was controlled with the bipolar. Two large hemoclips placed proximally and one at the cysti-gallbladder junction and duct was transected. Gallbladder dissected off the liver bed. A specimen bag was placed via the 8 mm  supra-umbilical trocar and specimen removed. Fascial defect was reapproximated with #1 Eithibond with a Kai-Murray in a figure-of-eight fashion.  The CO2 was evacuated.  Skin was reapproximated with 4-0 Monocryl in a subcuticular fashion.  LiquiBand was placed.  The patient was extubated.The needles, sponges and instrument counts were correct. The patient taken to the recovery room in stable condition.   I spoke to the family.                   Ranulfo Knowles  Phone Number: [phone]

## 2024-11-12 NOTE — H&P
History Of Present Illness  64-year-old female patient referred to me for abdominal pain; she underwent EGD and colonoscopy on 2024 by Dr. Santoyo and findings were normal.  In 2024, H. pylori, CMP, amylase and lipase were normal.  Ultrasound on  showed no gallstones, 5 mm common bile duct, no gallbladder wall thickening or pericholecystic fluid.  For the past 5 months, she has been having epigastric pain; pain is sharp, throbbing, 10 out of 10 and usually after meal with no radiation. Pain more noticeable with milk and acidic food.  The pain is usually followed by non-bloody mushy bowel movements.  Denies nausea, vomiting, fevers and chills.  Denies family history of inflammatory bowel disease. Smokes one pack of cigarette daily; she has been going through divorce for the past 2 years and has been very stressful for her.      Past Medical History  Past Medical History:   Diagnosis Date    Basal cell carcinoma of skin     Chest pain, unspecified 2017    Chest pain, unspecified type    Other bursitis of hip, left hip     Bursitis of left hip    Personal history of other diseases of the circulatory system 2017    History of essential hypertension    Personal history of other diseases of the respiratory system 2019    History of acute pharyngitis    Personal history of other mental and behavioral disorders     History of anxiety    Personal history of other specified conditions     History of chest pain    Personal history of other specified conditions     History of flank pain    Wears glasses        Surgical History  Past Surgical History:   Procedure Laterality Date    BACK SURGERY      with hardware     SECTION, CLASSIC      X 2    COLONOSCOPY      HYSTERECTOMY      LAPAROSCOPY DIAGNOSTIC / BIOPSY / ASPIRATION / LYSIS      MARIZA    LA BREAST AUGMENTATION WITH IMPLANT      SKIN CANCER EXCISION      UPPER GASTROINTESTINAL ENDOSCOPY          Social History  She  "reports that she has been smoking cigarettes. She has a 40 pack-year smoking history. She has never used smokeless tobacco. She reports that she does not currently use alcohol. She reports that she does not use drugs.    Family History  Family History   Problem Relation Name Age of Onset    Hypertension Mother      Heart attack Father      Hypertension Father      Stroke Father      Hypertension Sister      Stroke Sister      Hypertension Brother      Tuberculosis Other GRANDFATHER         Allergies  Codeine         Physical Exam   Constitutional: no acute distress, well appearing and well nourished  Eyes: conjunctiva and lids with no erythema, swelling or discharge; EOMI  Ears, Nose, Mouth and Throat: external inspection of ears and nose are normal; Pulmonary: normal respiratory effort; clear to auscultation bilaterally, no wheezes or bronchi   Cardiovascular: regular rate and rhythm, no murmurs or extra-heart sounds; pedal pulses are normal; no extremities edema or varicosities, no peripheral edema  Abdomen: soft, non-tender, non-distended; no organomegaly; no peritoneal sign, no hernia  Musculoskeletal: digits and nails normal without clubbing or cyanosis; Joints, bones and muscles are normal with normal range of motion; muscle strength/tone is normal  Skin: normal without rashes or lesion  Neurologic: cranial nerve II-XII intact grossly; normal gait  Psychiatric: oriented to person, place and time  Last Recorded Vitals  Blood pressure 105/67, pulse 76, temperature 36.3 °C (97.3 °F), temperature source Temporal, resp. rate 18, height 1.702 m (5' 7\"), weight 59.8 kg (131 lb 13.4 oz), SpO2 100%.    Relevant Results         Assessment/Plan   Assessment & Plan  Gallbladder attack    Gastroesophageal reflux disease    64-year-old female patient with usually postprandial epigastric abdominal pain, followed by nonbloody mushy stools.  Episode noticeable with acidic and diary foods. She had a normal EGD and colonoscopy " in September 9.  Normal gallbladder ultrasound in August. CMP, amylase and lipase were normal in August.  HIDA scan showed gallbladder ejection fraction at 95%.  I told her this is consistent with hypercontractile gallbladder.  We talked about outcome after cholecystectomy which is the pain may or may not resolve.  She is consented for robotic cholecystectomy, possible laparoscopic.  I explained to her the procedure, the risks and complications which include but not limited to bleeding, infection, bowel injury, common bile duct injury and injury to surrounding structures.  All questions answered and willing to proceed.      Ranulfo Knowles MD

## 2024-11-12 NOTE — ANESTHESIA PREPROCEDURE EVALUATION
Melani Mendieta is a 64 y.o. female here for:    Cholecystectomy Robot-Assisted  With Ranulfo Knowles MD  Pre-Op Diagnosis Codes:      * Gallbladder attack [K82.9]    Relevant Problems   Cardiac   (+) Hyperlipidemia      Neuro   (+) Anxiety   (+) Lumbosacral plexus lesion   (+) Panic anxiety syndrome      GI   (+) Gastroesophageal reflux disease      /Renal   (+) Acute UTI (urinary tract infection)      Musculoskeletal   (+) Osteoarthritis      HEENT   (+) Acute sinusitis      ID   (+) Acute UTI (urinary tract infection)   (+) Mononucleosis syndrome   (+) Tinea corporis   (+) Tinea incognito   (+) Viral URI      Skin   (+) Cancer of the skin, basal cell   (+) Nummular eczema   (+) Oral lichen planus       Lab Results   Component Value Date    HGB 14.1 2024    HGB 13.3 2024    HCT 42.2 2024    WBC 7.5 2024     2024     2024    K 4.4 2024     2024    CREATININE 0.96 2024    BUN 13 2024       Social History     Tobacco Use   Smoking Status Every Day    Current packs/day: 1.00    Average packs/day: 1 pack/day for 40.0 years (40.0 ttl pk-yrs)    Types: Cigarettes   Smokeless Tobacco Never       Allergies   Allergen Reactions    Codeine GI bleeding       Current Outpatient Medications   Medication Instructions    busPIRone (BUSPAR) 10 mg, oral, 2 times daily    magnesium oxide (MAG-OX) 400 mg, oral, Daily, Take as instructed follow stem cell collection    omeprazole (PRILOSEC) 40 mg, oral, Daily before breakfast, Do not crush or chew.    polyethylene glycol (GLYCOLAX, MIRALAX) 17 g, oral, Daily    varenicline (CHANTIX CONTINUING MONTH BOX) 1 mg, oral, 2 times daily    varenicline (Chantix LUKAS) 0.5 mg (11)- 1 mg (42) tablet USE AS DIRECTED       Past Surgical History:   Procedure Laterality Date    BACK SURGERY      with hardware     SECTION, CLASSIC      X 2    COLONOSCOPY      HYSTERECTOMY      LAPAROSCOPY DIAGNOSTIC / BIOPSY /  ASPIRATION / LYSIS      MARIZA    VA BREAST AUGMENTATION WITH IMPLANT      SKIN CANCER EXCISION      UPPER GASTROINTESTINAL ENDOSCOPY         Family History   Problem Relation Name Age of Onset    Hypertension Mother      Heart attack Father      Hypertension Father      Stroke Father      Hypertension Sister      Stroke Sister      Hypertension Brother      Tuberculosis Other GRANDFATHER        NPO Details:  NPO/Void Status  Carbohydrate Drink Given Prior to Surgery? : N  Date of Last Liquid: 11/11/24  Time of Last Liquid: 2355  Date of Last Solid: 11/11/24  Time of Last Solid: 2355  Last Intake Type: Clear fluids  Time of Last Void: 1000        Physical Exam    Airway  Mallampati: II  TM distance: >3 FB  Neck ROM: full     Cardiovascular - normal exam     Dental - normal exam     Pulmonary - normal exam     Abdominal            Anesthesia Plan    History of general anesthesia?: yes  History of complications of general anesthesia?: no    ASA 2     general     The patient is a current smoker.    intravenous induction   Postoperative administration of opioids is intended.  Trial extubation is planned.  Anesthetic plan and risks discussed with patient.    Plan discussed with CRNA.    ]

## 2024-11-12 NOTE — DISCHARGE INSTRUCTIONS
Ok to shower; no lifting more than 10 lbs for 6 wks; remove glue in 1 wk; ice pack to left groin  General Anesthesia Discharge Instructions    About this topic  You may need general anesthesia if you need to be asleep during a procedure. Your doctor will use drugs to block the signals that go from your nerves to your brain. Doctors give general anesthesia during a surgery or procedure to:  Allow you to sleep  Help your body be still  Relax your muscles  Help you to relax and be pain free  Keep you from remembering the surgery  Let the doctor manage your airway, breathing, and blood flow  The doctor or nurse anesthetist gives general anesthesia by a shot into your vein. Sometimes, you may breathe in a gas through a mask placed over your face.  What care is needed at home?  Ask your doctor what you need to do when you go home. Make sure you ask questions if you do not understand what the doctor says.  Your doctor may give you drugs to prevent or treat an upset stomach from the anesthetic. Take them as ordered.  If your throat is sore, suck on ice chips or popsicles to ease throat pain.  Put 2 to 3 pillows under your head and back when you lie down to help you breathe easier.  For the first 24 to 48 hours:  Do not operate heavy or dangerous machinery.  Do not make major decisions or sign important papers. You may not be able to think clearly.  Avoid beer, wine, or mixed drinks.  You are at a higher risk of falling for at least 24 hours after general anesthesia.  Take extra care when you get up.  Do not change positions quickly.  Do not rush when you need to go to the bathroom or to answer the phone.  Ask for help if you feel unsteady when you try to walk.  Wear shoes with non-slip soles and low heels.  What follow-up care is needed?  Your doctor may ask you to come back to the office to check on your progress. Be sure to keep these visits.  If you have stitches that do not dissolve or staples, you will need to have  them removed. Your doctor will want to do this in 1 to 2 weeks. If the doctor used skin glue, the glue will fall off on its own.  What drugs may be needed?  The doctor may order drugs to:  Help with pain  Treat an upset stomach or throwing up  Will physical activity be limited?  You will not be allowed to drive right away after the procedure. Ask a family member or a friend to drive you home.  Avoid trying to get out of bed without help until you are sure of your balance.  You may have to limit your activity. Talk to your doctor about if you need to limit how much you lift or limit exercise after your procedure.  What changes to diet are needed?  Start with a light diet when you are fully awake. This includes things that are easy to swallow like soups, pudding, jello, toast, and eggs. Slowly progress to your normal diet.  What problems could happen?  Low blood pressure  Breathing problems  Upset stomach or throwing up  Dizziness  Blood clots  Infection  When do I need to call the doctor?  Trouble breathing  Upset stomach or throwing up more than 3 times in the next 2 days  Dizziness  Teach Back: Helping You Understand  The Teach Back Method helps you understand the information we are giving you. After you talk with the staff, tell them in your own words what you learned. This helps to make sure the staff has described each thing clearly. It also helps to explain things that may have been confusing. Before going home, make sure you can do these:  I can tell you about my procedure.  I can tell you if I need to follow up with my doctor.  I can tell you what is good for me to eat and drink the next day.  I can tell you what I would do if I have trouble breathing, an upset stomach, or dizziness.  Where can I learn more?  [employer] [place] [employer]  https://www.nigms.nih.gov/education/pages/factsheet_Anesthesia.aspx  [facility]  Choices  http://www.nhs.uk/conditions/Anaesthetic-general/Pages/Definition.aspx  Last Reviewed Date  2020-04-22

## 2024-11-12 NOTE — ANESTHESIA POSTPROCEDURE EVALUATION
Patient: Melani Mendieta    Procedure Summary       Date: 11/12/24 Room / Location: [facility] OR 08 / [facility]    Anesthesia Start: 1342 Anesthesia Stop: 1520    Procedure: Cholecystectomy Robot-Assisted Diagnosis:       Gallbladder attack      (Gallbladder attack [K82.9])    Surgeons: Ranulfo Knowles MD Responsible Provider: Sae Patel DO    Anesthesia Type: general ASA Status: 2            Anesthesia Type: general    Vitals Value Taken Time   /80 11/12/24 1517   Temp 36 11/12/24 1521   Pulse 80 11/12/24 1519   Resp 16 11/12/24 1519   SpO2 100 % 11/12/24 1519   Vitals shown include unfiled device data.    Anesthesia Post Evaluation    Patient location during evaluation: bedside  Patient participation: complete - patient participated  Level of consciousness: awake  Pain score: 1  Pain management: adequate  Airway patency: patent  Cardiovascular status: acceptable  Respiratory status: acceptable  Hydration status: acceptable  Postoperative Nausea and Vomiting: none        There were no known notable events for this encounter.

## 2024-11-25 LAB
LABORATORY COMMENT REPORT: NORMAL
PATH REPORT.FINAL DX SPEC: NORMAL
PATH REPORT.GROSS SPEC: NORMAL
PATH REPORT.TOTAL CANCER: NORMAL
RESIDENT REVIEW: NORMAL

## 2024-12-02 ENCOUNTER — APPOINTMENT (OUTPATIENT)
Dept: SURGERY | Facility: CLINIC | Age: 64
End: 2024-12-02
Payer: COMMERCIAL

## 2024-12-02 VITALS — HEIGHT: 67 IN | WEIGHT: 130 LBS | BODY MASS INDEX: 20.4 KG/M2

## 2024-12-02 DIAGNOSIS — Z90.49 S/P LAPAROSCOPIC CHOLECYSTECTOMY: Primary | ICD-10-CM

## 2024-12-02 PROCEDURE — 3008F BODY MASS INDEX DOCD: CPT | Performed by: SURGERY

## 2024-12-02 PROCEDURE — 99024 POSTOP FOLLOW-UP VISIT: CPT | Performed by: SURGERY

## 2024-12-02 NOTE — PROGRESS NOTES
Subjective   Patient ID: Melani Mendieta is a 64 y.o. female who presents for Post-op.  HPI  64-year-old female patient who underwent robotic cholecystectomy on November 12.  Pathology showed chronic cholecystitis.  She had 2 episodes of abdominal gas pain resolved with walking.  Also reports nonbloody diarrhea. Tolerating diet    Objective   Physical Exam  Abd: soft, non-tender, non-distended; incisions approximated  Assessment/Plan   Satisfactory postoperative course.  I told her the diarrhea will resolve and we will proceed with observation at this time; follow-up as needed         Ranulfo Knowles MD 12/02/24 10:14 AM

## 2024-12-12 ENCOUNTER — APPOINTMENT (OUTPATIENT)
Dept: PRIMARY CARE | Facility: CLINIC | Age: 64
End: 2024-12-12
Payer: COMMERCIAL

## 2024-12-12 VITALS
RESPIRATION RATE: 18 BRPM | OXYGEN SATURATION: 99 % | SYSTOLIC BLOOD PRESSURE: 120 MMHG | DIASTOLIC BLOOD PRESSURE: 80 MMHG | HEART RATE: 74 BPM | TEMPERATURE: 97 F | BODY MASS INDEX: 21.06 KG/M2 | HEIGHT: 67 IN | WEIGHT: 134.2 LBS

## 2024-12-12 DIAGNOSIS — F17.200 SMOKER: ICD-10-CM

## 2024-12-12 DIAGNOSIS — Z13.820 ENCOUNTER FOR OSTEOPOROSIS SCREENING IN ASYMPTOMATIC POSTMENOPAUSAL PATIENT: Primary | ICD-10-CM

## 2024-12-12 DIAGNOSIS — Z52.001 DONOR OF STEM CELL: ICD-10-CM

## 2024-12-12 DIAGNOSIS — Z78.0 ENCOUNTER FOR OSTEOPOROSIS SCREENING IN ASYMPTOMATIC POSTMENOPAUSAL PATIENT: Primary | ICD-10-CM

## 2024-12-12 DIAGNOSIS — F17.213 CIGARETTE NICOTINE DEPENDENCE WITH WITHDRAWAL: ICD-10-CM

## 2024-12-12 DIAGNOSIS — E55.9 VITAMIN D DEFICIENCY: ICD-10-CM

## 2024-12-12 DIAGNOSIS — F41.9 ANXIETY: ICD-10-CM

## 2024-12-12 DIAGNOSIS — M81.0 OSTEOPOROSIS, UNSPECIFIED OSTEOPOROSIS TYPE, UNSPECIFIED PATHOLOGICAL FRACTURE PRESENCE: ICD-10-CM

## 2024-12-12 PROCEDURE — 99213 OFFICE O/P EST LOW 20 MIN: CPT | Performed by: FAMILY MEDICINE

## 2024-12-12 PROCEDURE — 3008F BODY MASS INDEX DOCD: CPT | Performed by: FAMILY MEDICINE

## 2024-12-12 RX ORDER — LANOLIN ALCOHOL/MO/W.PET/CERES
400 CREAM (GRAM) TOPICAL DAILY
Qty: 90 TABLET | Refills: 3 | Status: SHIPPED | OUTPATIENT
Start: 2024-12-12 | End: 2025-12-12

## 2024-12-12 RX ORDER — VARENICLINE TARTRATE 0.5 (11)-1
0.5 KIT ORAL
Qty: 53 EACH | Refills: 0 | Status: CANCELLED | OUTPATIENT
Start: 2024-12-12

## 2024-12-12 RX ORDER — BUSPIRONE HYDROCHLORIDE 15 MG/1
15 TABLET ORAL 2 TIMES DAILY
Qty: 60 TABLET | Refills: 3 | Status: SHIPPED | OUTPATIENT
Start: 2024-12-12 | End: 2025-01-11

## 2024-12-12 RX ORDER — VARENICLINE TARTRATE 0.5 MG/1
0.5 TABLET, FILM COATED ORAL 2 TIMES DAILY
Qty: 60 TABLET | Refills: 2 | Status: SHIPPED | OUTPATIENT
Start: 2024-12-12 | End: 2025-03-12

## 2024-12-12 NOTE — ASSESSMENT & PLAN NOTE
Discussed with patient calcium intake and will get second opinion with rheumatologist regarding treatment in light of her age and calcium handout given and discussed vitamin D supplementation

## 2024-12-12 NOTE — ASSESSMENT & PLAN NOTE
Reviewed with patient nicotine usage and attempts to discontinue with multiple modalities including nicotine lozenge/Chantix/BuSpar

## 2024-12-12 NOTE — PROGRESS NOTES
Subjective   Patient ID: Melani Mendieta is a 64 y.o. female who presents for Follow-up.  HPI  Patient here for follow-up after recent gallbladder surgery according to patient he was successful with no minimal complex patient she did have CAT scan done and was worried about findings of possible aneurysm and would like to discuss.  Otherwise her abdominal pain which we noted in the last visit has solved.  This pain was predominantly in the epigastric location chronic quite severe 9 out of 10 in severity which was relieved by recumbency and he had complete workup including endoscopy.  The symptoms after surgery are no longer present.  The scan 8/12/2024 did show nondistended gallbladder with no abnormalities.  Did he have nuclear medicine HIDA scan performed which showed no signs of cystic duct obstruction.  However in light of her recurrent pain and after surgical evaluation 9/26/2024 by Dr. Ventura who historically noted pain triggered by fatty foods like milk and also acidic foods he has review and recommendation was to do CT scan.    In light of ejection fraction being 95% the diagnosis was hypocontractile gallbladder with encouragement for cholecystectomy which was felt to improve her symptoms and she thus elected to move forward with care E report today improvement with no longer having symptoms and good recovery    She has had some difficulties with smoking cessation pacifically using the Chantix noticing some nausea associated with medication and is wondering if options and assistance can be done to help her get through this addiction.    Patient also here to discuss bone density.  She is aware of her diagnosis of osteoporosis he states that she was placed on Fosamax in the past to which she does not take the medication because she does not like to take medication.      Review of Systems  All other  pertinent  systems reviewed and are negative except  those  mentioned  in HPI     Objective   Physical  Exam  Vitals: I have reviewed the vitals  General: Well-developed.  In no acute distress.  Eyes:   sclera nonicteric.  Conjunctiva not injected.  No discharge.   HEAD: Normocephalic, atraumatic.  HEENT   Mucous membranes moist.  Posterior oropharynx nonerythematous, no tonsillar exudates.      No cervical lymphadenopathy.  Cardio: Regular rate and rhythm.  No murmur, rub or gallop.  Pulmonary: Lungs clear to auscultation in all fields.  No accessory muscle use.  GI/: Normal active bowel sounds.  Soft, nontender.  No masses or organomegaly appreciated.  Musculoskeletal: No gross deformities appreciated.  Neuro: Alert, age-appropriate.  Normal muscle tone.  Moving all extremities.  Skin: No rash, bruises or lesions.   Labs        Assessment/Plan   Problem List Items Addressed This Visit       Nicotine dependence     Reviewed with patient nicotine usage and attempts to discontinue with multiple modalities including nicotine lozenge/Chantix/BuSpar         Osteoporosis     Discussed with patient calcium intake and will get second opinion with rheumatologist regarding treatment in light of her age and calcium handout given and discussed vitamin D supplementation         Relevant Orders    Referral to Rheumatology    Smoker    Relevant Medications    varenicline (Chantix) 0.5 mg tablet    Vitamin D deficiency     Please take vitamin D 2000 IUs daily         Anxiety    Relevant Medications    busPIRone (Buspar) 15 mg tablet     Other Visit Diagnoses       Encounter for osteoporosis screening in asymptomatic postmenopausal patient    -  Primary    Relevant Orders    XR DEXA bone density    Donor of stem cell        Relevant Medications    magnesium oxide (Mag-Ox) 400 mg (241.3 mg magnesium) tablet

## 2024-12-12 NOTE — PATIENT INSTRUCTIONS
Please consider exercise program involving walking or some other form of aerobic activity 5 days weekly for 30 minutes... Let's also consider strengthening of large muscle groups like the abdominal muscles or shoulder muscles... Twice weekly with reps of 5/10/15 exercises and gradually increase strength.. This is not heavy strength training but light weight training... Sit ups or back exercise routine.. Please ask for handout if uncertain how to do..This  will help to strengthen your muscles which in turn will help you to lose weight.... You might ask what is the best diet available.. I would strongly encourage you to consider  Weight Watchers.. And as  your  fellow on  Weight Watchers physician attempting to  live this  LIFE  style  choice with you....  I will be glad to give you recommendations on what to eat.. Consider buying Sandra bread.., luis alberto bagle thin bread.. oikos yogurt... eggs  to eat as hard-boiled... Halo top ice cream for snack... All these are delicious foods which.. when eaten and  being compliant eating three  meals daily  breakfast lunch and dinner, drinking  64 ounces of water daily we will all win together !!!!!!!. This will be a means for you to lose weight... Consider also the smart phone paola ... My plate.. Or My  fitness  pal..,  as additional possibilities for weight loss... Good  lucpriscilla Castle!    Discussed medication side effects.  The  risk benefits and treatment options  discussed with patient.       Please schedule follow-up appointment based upon your improvement/failure to improve/chronic medical conditions and physician recommendations during office appointment at the .       Patient advised to go to er if symptoms worsen or to call answering service, or to return to office for additional evaluation    This note was partially  generated using Dragon voice recognition and there may be incorrect words, wording, spelling, or pronunciation errors that were not  corrected prior to committing the note to the medical record.       Manage cravings  Cravings for tobacco happen more often and it     may  take  days and weeks to  quit. The longer you go without smoking, the fewer   urges you  will have. fighting cravings can be easier if you have a plan  Know you're triggers  Make a list of things to try when the urge to smoke hits. Find what works best for you.  Keep your hands busy ...  plan an alternative  instead of picking up a cigarette. Keep hard candies, fruits and vegetables with you.    Drink more water . this helps 2+ to    flush nicotine out of your system faster. It also also reduces   that in the moment craving.  .  Keep moving. Try going for a walk or jog. Go up and down the stairs a few times. Take  dog for a walk. Physical activity even in  short bursts can help with her energy and nicotine craving.  Take slow deep breaths, refill your craving. Slowly inhale through your nose and exhale thruh your mouth. Repeat this 10 times or 2 you're feeling more relaxed.  Stay motivated.  Quitting as a process. Take it one day at a time. The first hours, days and weeks without she was can be hard. Keep a positive outlook can help you get through.  .   IT can take a person an average of 7 times before successfully quitting. Remind herself of all reason to have STARTED . Stay away from situations STIMULATING YOU  to make you want to smoke.  Reward YOURSELF  along the way celebrates your success using money saved on expensive tobacco products to buy something special. Enjoying outdoor height now that you can breathe easier. Enjoying the fact that YOU NO longer smoke tobacco. Notice that food taste better when you're not smoking or chewing tobacco.  Prevent weight gain  Not everyone will gain weight when they quit smoking. The average waking can be less than 10 pounds. To help prevent weight gain:  8 plenty of fruits and vegetables.    Adequate diet. Plan water it will help keep  you fall and persistent. Get MORE  sleep. Exercise go for walks at nighttime and daytime WHEN YOUu would otherwise have cigarette

## 2025-01-07 ENCOUNTER — APPOINTMENT (OUTPATIENT)
Dept: RADIOLOGY | Facility: HOSPITAL | Age: 65
End: 2025-01-07
Payer: COMMERCIAL

## 2025-01-15 ENCOUNTER — APPOINTMENT (OUTPATIENT)
Dept: RADIOLOGY | Facility: HOSPITAL | Age: 65
End: 2025-01-15
Payer: COMMERCIAL

## 2025-01-29 ENCOUNTER — APPOINTMENT (OUTPATIENT)
Dept: RADIOLOGY | Facility: HOSPITAL | Age: 65
End: 2025-01-29
Payer: COMMERCIAL

## 2025-02-03 ENCOUNTER — APPOINTMENT (OUTPATIENT)
Dept: RADIOLOGY | Facility: HOSPITAL | Age: 65
End: 2025-02-03
Payer: COMMERCIAL

## 2025-03-06 ENCOUNTER — APPOINTMENT (OUTPATIENT)
Dept: SURGERY | Facility: CLINIC | Age: 65
End: 2025-03-06
Payer: COMMERCIAL

## 2025-03-06 VITALS — HEIGHT: 67 IN | BODY MASS INDEX: 21.66 KG/M2 | WEIGHT: 138 LBS

## 2025-03-06 DIAGNOSIS — R19.7 DIARRHEA, UNSPECIFIED TYPE: Primary | ICD-10-CM

## 2025-03-06 PROCEDURE — 99213 OFFICE O/P EST LOW 20 MIN: CPT | Performed by: SURGERY

## 2025-03-06 PROCEDURE — 3008F BODY MASS INDEX DOCD: CPT | Performed by: SURGERY

## 2025-03-06 RX ORDER — LOPERAMIDE HCL 2 MG
2 TABLET ORAL
Qty: 30 TABLET | Refills: 0 | Status: SHIPPED | OUTPATIENT
Start: 2025-03-06 | End: 2025-04-05

## 2025-03-06 NOTE — PROGRESS NOTES
Subjective   Patient ID: Melani Mendieta is a 64 y.o. female who presents for Follow-up (Diarrhea. S/p cholecystectomy in nov. ).  HPI  64-year-old female patient who on November 12, 2024 underwent robotic assisted cholecystectomy.  Colonoscopy was September 2024 showing hemorrhoids.  She has been having non-bloody diarrhea and gas immediately and few minutes after any meal daily. Also reports abdominal cramps immediately before the diarrhea. Denies nausea and vomiting.     Objective   Physical Exam  Gen: no acute distress  CV: RRR  Pulm: CTAB  Abd: soft, non-distended, non-tender  Assessment/Plan   Patient with post-cholecystectomy diarrhea.  Start Imodium beginning with 2 mg and will advance as noted; all questions answered.       Ranulfo Knowles MD 03/06/25 11:47 AM

## 2025-04-14 NOTE — PROGRESS NOTES
Subjective   Patient ID: [id number]   Melani Bermudez is a 65 y.o. female with OP, Vit D def, OA, DL, varicose veins, allergic rhinitis, colonic polyps, GERD, anxiety, panic attacks, eczema, smoker, who presents for OP, referred by PCP Dr. Castle    Current rheum meds:  - Vitamin D 5000 international units daily     Previous rheum meds:  - Fosamax for 1 month     HPI  History of falls / fractures: yes hand fracture, tripped on something   Loss of height: no  Tobacco: yes around 10 cig per day for 50 years   Alcohol: yes  Vitamin D supplementation: yes  Calcium supplementation: no  Weight bearing exercise: yes walks almost everyday, exercises for the back   Previous or planned invasive jaw surgery: yes  History of radiation: no  Chronic steroid use: no  History of RA: no  GERD: no  Sonali-en-y: no  CKD / GFR <30: no  Parental hip fracture: no    PSH: Back surgeries   Allergies: Codeine   Habits: Smoking around 25 py, occasional alcohol, no recreational drugs   Social hx: , 2 children, retired  FHx: No family history of autoimmune diseases or OP    ROS:  Per HPI    Patient Active Problem List   Diagnosis    Abnormal chest xray    Allergic rhinitis    Bronchitis    Condition not found    Cervical strain    Diarrhea    Cough    Facial pressure    Fever    Hypertrophy of both inferior nasal turbinates    Mononucleosis syndrome    Nasal congestion    Headache    Neck pain    Nicotine dependence    Night sweats    Noncompliance    Oral lichen planus    Osteoarthritis    Osteoporosis    Pelvic pain    Abdominal pain    Rhinorrhea    Rib contusion, right, sequela    Shoulder pain, left    Smoker    Sore throat    Tinea corporis    Tinea incognito    Varicose vein of leg    Vertigo    Vitamin D deficiency    Weakness    Acute bilateral low back pain    Anxiety    Spondylolisthesis, lumbar region    Cancer of the skin, basal cell    Dysuria    Polyp of colon    Diffuse cystic mastopathy    Lump or mass in breast     Nummular eczema    Pain of both shoulder joints    Telangiectasia of skin    Acute sinusitis    Acute UTI (urinary tract infection)    Back stiffness    Hallux abductovalgus with bunions    Hyperlipidemia    Panic anxiety syndrome    Post-operative pain    Right foot pain    Lumbosacral plexus lesion    Viral URI    Stem cell donor    Gallbladder attack    Gastroesophageal reflux disease    S/P laparoscopic cholecystectomy      Past Medical History:   Diagnosis Date    Basal cell carcinoma of skin     Chest pain, unspecified 2017    Chest pain, unspecified type    Other bursitis of hip, left hip     Bursitis of left hip    Personal history of other diseases of the circulatory system 2017    History of essential hypertension    Personal history of other diseases of the respiratory system 2019    History of acute pharyngitis    Personal history of other mental and behavioral disorders     History of anxiety    Personal history of other specified conditions     History of chest pain    Personal history of other specified conditions     History of flank pain    Wears glasses      Past Surgical History:   Procedure Laterality Date    BACK SURGERY      with hardware     SECTION, CLASSIC      X 2    CHOLECYSTECTOMY      COLONOSCOPY      HYSTERECTOMY      LAPAROSCOPY DIAGNOSTIC / BIOPSY / ASPIRATION / LYSIS      MARIZA    [place] BREAST AUGMENTATION WITH IMPLANT      SKIN CANCER EXCISION      UPPER GASTROINTESTINAL ENDOSCOPY       Social History     Socioeconomic History    Marital status: Legally      Spouse name: Not on file    Number of children: Not on file    Years of education: Not on file    Highest education level: Not on file   Occupational History    Not on file   Tobacco Use    Smoking status: Every Day     Current packs/day: 1.00     Average packs/day: 1 pack/day for 40.0 years (40.0 ttl pk-yrs)     Types: Cigarettes    Smokeless tobacco: Never   Vaping Use    Vaping status: Never Used    Substance and Sexual Activity    Alcohol use: Not Currently     Comment: occasional    Drug use: Never    Sexual activity: Defer     Comment: HYSTER   Other Topics Concern    Not on file   Social History Narrative    Not on file     Social Drivers of Health     Financial Resource Strain: Low Risk  (11/10/2023)    Received from [facility]    Overall Financial Resource Strain (CARDIA)     Difficulty of Paying Living Expenses: Not hard at all   Food Insecurity: No Food Insecurity (11/10/2023)    Received from [facility]    Hunger Vital Sign     Worried About Running Out of Food in the Last Year: Never true     Ran Out of Food in the Last Year: Never true   Transportation Needs: Unknown (11/10/2023)    Received from [facility]    PRAPARE - Transportation     Lack of Transportation (Medical): Not on file     Lack of Transportation (Non-Medical): No   Physical Activity: Not on file   Stress: Not on file   Social Connections: Not on file   Intimate Partner Violence: Not on file   Housing Stability: Unknown (11/10/2023)    Received from [facility]    Housing Stability Vital Sign     Unable to Pay for Housing in the Last Year: Not on file     Number of Places Lived in the Last Year: Not on file     Unstable Housing in the Last Year: No     Allergies   Allergen Reactions    Codeine GI bleeding     Current Outpatient Medications:     busPIRone (Buspar) 15 mg tablet, Take 1 tablet (15 mg) by mouth 2 times a day., Disp: 60 tablet, Rfl: 3    magnesium oxide (Mag-Ox) 400 mg (241.3 mg magnesium) tablet, Take 1 tablet (400 mg) by mouth once daily. Take as instructed follow stem cell collection (Patient not taking: Reported on 3/6/2025), Disp: 90 tablet, Rfl: 3    varenicline (Chantix) 0.5 mg tablet, Take 1 tablet (0.5 mg) by mouth 2 times a day. Take with full glass of water. (Patient not taking: Reported on 3/6/2025), Disp: 60 tablet, Rfl:  2     Objective   Visit Vitals  /78   Pulse 79   Temp 35.9 °C (96.6 °F)   Resp 20   Wt 62.1 kg (137 lb)   BMI 21.46 kg/m²   OB Status Hysterectomy   Smoking Status Every Day   BSA 1.71 m²     Physical Exam:  General: AAOx3, Cooperative  Head: normocephalic, atraumatic, no hair loss   Eyes: EOMI, conjunctiva clear, sclera white, anicteric  Ears: hearing intact  Nose: no deformity, no crusting   Throat/Mouth: No oral deformities, no cheek swelling, mucosa appear moist, no oral ulcers noted or loss of dentition   Skin: No rashes, ulcers or photosensitive areas, multiple tattoos  MSK: Upper Extremities:  Hand/Fingers: No erythema, edema, tenderness or warmth at DIP, PIP, or MCP joints, FROM grossly. Good hand . No nodules. No deformities   Wrists: No erythema, edema, warmth or tenderness at wrist, FROM grossly  Elbows: No tenderness, edema, erythema or warmth at elbows, FROM grossly. No nodules   Shoulders: No edema, erythema, tenderness or warmth at shoulders. FROM  Lower Extremities:   Hips: No obvious deformities. No joint tenderness, normal ROM grossly. Log roll test negative bilaterally. Cristel test is negative bilaterally. No trochanteric bursae TTP  Knees: No tenderness, deformities, edema, rashes, or warmth, normal ROM grossly. No crepitus, no pes anserine bursa TTP   Ankles, feet: No deformities, tenderness, edema, erythema, ulceration, or warmth at the ankle or MTP/IP joints, normal ROM grossly  Spine: No spinal tenderness to palpation. No SI joint tenderness     Lab Results   Component Value Date    WBC 7.5 08/12/2024    HGB 14.1 08/12/2024    HCT 42.2 08/12/2024    MCV 97 08/12/2024     08/12/2024        Chemistry    Lab Results   Component Value Date/Time     09/26/2024 1134    K 4.4 09/26/2024 1134     09/26/2024 1134    CO2 29 09/26/2024 1134    BUN 13 09/26/2024 1134    CREATININE 0.96 09/26/2024 1134    Lab Results   Component Value Date/Time    CALCIUM 10.3 09/26/2024 1134     ALKPHOS 42 08/12/2024 1515    AST 15 08/12/2024 1515    ALT 12 08/12/2024 1515    BILITOT 0.5 08/12/2024 1515        Lab Results   Component Value Date    SEDRATE 3 08/12/2024      Lab Results   Component Value Date    NEUTROABS 4.82 08/12/2024     Lab Results   Component Value Date    HEPBCIGM Nonreactive 12/27/2023    HEPBCAB Nonreactive 12/27/2023    HEPCAB Nonreactive 12/27/2023      Lab Results   Component Value Date    ALT 12 08/12/2024    AST 15 08/12/2024    ALKPHOS 42 08/12/2024    BILITOT 0.5 08/12/2024      Lab Results   Component Value Date    URICACID 3.7 02/23/2023      Lab Results   Component Value Date    CALCIUM 10.3 09/26/2024    CAION 1.28 01/16/2024    PHOS 3.0 12/27/2023     XR DEXA bone density extremity  Narrative: Interpreted By:  Angelo Lee,   STUDY:  DEXA BONE DENSITY EXTREMITY4/23/2025 12:35 pm      INDICATION:  Signs/Symptoms:osteoporosis.  The patient is a 65 y/o  year old F.      ,Z13.820 Encounter for screening for osteoporosis,Z78.0 Asymptomatic  menopausal state      COMPARISON:  None.      ACCESSION NUMBER(S):  [id number]      ORDERING CLINICIAN:  MARCIANO JUNIOR      TECHNIQUE:  DEXA BONE DENSITY EXTREMITY      FINDINGS:  LEFT FEMUR -TOTAL  Bone Mineral Density: 0.746  T-Score -2.1   Z-Score  -0.8  Bone Mineral Density change vs baseline: Not reported  Bone Mineral Density change vs previous: Not reported      LEFT FEMUR -NECK  Bone Mineral Density: 0.721  T-Score -2.3  Z-Score -0.7  Bone Mineral Density change vs baseline: Not reported  Bone Mineral Density change vs previous: Not reported      LEFT FOREARM  Total  Bone Mineral Density: 0.588  T-Score -1.4  Z-Score -0.1  UD  Bone Mineral Density: 0.522  T-Score 1.3 Z-Score 2.7  MID  Bone Mineral Density: Not reported  T-Score Not reported Z-Score Not reported  1/3  Bone Mineral Density: 0.667  T-Score -2.4   Z-Score -1.0  Bone Mineral Density change vs baseline:  Not reported  Bone Mineral Density change vs previous:   Not reported          World Health Organization (WHO) criteria for post-menopausal,   Women:  Normal:         T-score at or above -1 SD  Osteopenia:   T-score between -1 and -2.5 SD  Osteoporosis: T-score at or below -2.5 SD          10-year Fracture Risk:  Major Osteoporotic Fracture  18.9  Hip Fracture                        5.9      Note:  If no FRAX score is reported, it is because:  Some T-score for Spine Total or Hip Total or Femoral Neck at or below  -2.5      Impression: DEXA:  According to World Health Organization criteria,  classification is low bone mass (osteopenia)      Followup recommended in two years or sooner as clinically warranted.      All images and detailed analysis are available on the  Radiology  PACS.      MACRO:  None      Signed by: Angelo Lee 4/23/2025 12:58 PM  Dictation workstation:   [id number]     === 12/27/23 ===  XR CHEST 2 VIEWS  No evidence of acute cardiopulmonary process. Similar to previous exam      Assessment/Plan    This is a 64 y.o. female with OP, Vit D def, OA, DL, varicose veins, allergic rhinitis, colonic polyps, GERD, anxiety, nuria attacks, eczema, smoker, who presents for OP, referred by PCP Dr. Castle    Labs:  8/24: CBC, CMP wnl    Imaging:    # Osteopenia with high FRAX, indication to treat. Will do secondary work up. Patient counseling done, pt is now agreeable to start Fosamax     - Labs today  - Start Fosamax 70 mg weekly  - Will inform us if she gets any side effects  - Fall precautions  - Weight bearing exercises encouraged  - Avoid alcohol and smoking  - Continue vitamin D daily  - Start OTC calcium pills daily   - I will inform the patient of any urgent results, if any    RTC in 6 months    Plan, including risks and benefits, was discussed with the patient, informed on how to reach us.     To schedule an appointment, call [phone]  To reach the rheumatology office, call [phone]    Ania Redmond MD       Division of Rheumatology  [facility]

## 2025-04-28 ENCOUNTER — APPOINTMENT (OUTPATIENT)
Dept: RHEUMATOLOGY | Facility: CLINIC | Age: 65
End: 2025-04-28
Payer: COMMERCIAL

## 2025-04-28 VITALS
RESPIRATION RATE: 20 BRPM | DIASTOLIC BLOOD PRESSURE: 78 MMHG | BODY MASS INDEX: 21.46 KG/M2 | SYSTOLIC BLOOD PRESSURE: 103 MMHG | HEART RATE: 79 BPM | WEIGHT: 137 LBS | TEMPERATURE: 96.6 F

## 2025-04-28 DIAGNOSIS — M81.0 OSTEOPOROSIS, UNSPECIFIED OSTEOPOROSIS TYPE, UNSPECIFIED PATHOLOGICAL FRACTURE PRESENCE: ICD-10-CM

## 2025-04-28 DIAGNOSIS — Z87.39 PERSONAL HISTORY OF OSTEOPOROSIS: ICD-10-CM

## 2025-04-28 DIAGNOSIS — M81.8 ADULT IDIOPATHIC GENERALIZED OSTEOPOROSIS: ICD-10-CM

## 2025-04-28 LAB
ALBUMIN SERPL BCP-MCNC: 4.7 G/DL (ref 3.4–5)
ALP SERPL-CCNC: 59 U/L (ref 33–136)
ALT SERPL W P-5'-P-CCNC: 11 U/L (ref 7–45)
ANION GAP SERPL CALC-SCNC: 13 MMOL/L (ref 10–20)
AST SERPL W P-5'-P-CCNC: 15 U/L (ref 9–39)
BASOPHILS # BLD AUTO: 0.09 X10*3/UL (ref 0–0.1)
BASOPHILS NFR BLD AUTO: 1.2 %
BILIRUB SERPL-MCNC: 0.4 MG/DL (ref 0–1.2)
BUN SERPL-MCNC: 12 MG/DL (ref 6–23)
CALCIUM SERPL-MCNC: 9.8 MG/DL (ref 8.6–10.3)
CHLORIDE SERPL-SCNC: 106 MMOL/L (ref 98–107)
CO2 SERPL-SCNC: 25 MMOL/L (ref 21–32)
CREAT SERPL-MCNC: 0.81 MG/DL (ref 0.5–1.05)
EGFRCR SERPLBLD CKD-EPI 2021: 81 ML/MIN/1.73M*2
EOSINOPHIL # BLD AUTO: 0.15 X10*3/UL (ref 0–0.7)
EOSINOPHIL NFR BLD AUTO: 2 %
ERYTHROCYTE [DISTWIDTH] IN BLOOD BY AUTOMATED COUNT: 15.1 % (ref 11.5–14.5)
GLUCOSE SERPL-MCNC: 90 MG/DL (ref 74–99)
HCT VFR BLD AUTO: 42.1 % (ref 36–46)
HGB BLD-MCNC: 13.8 G/DL (ref 12–16)
IMM GRANULOCYTES # BLD AUTO: 0.02 X10*3/UL (ref 0–0.7)
IMM GRANULOCYTES NFR BLD AUTO: 0.3 % (ref 0–0.9)
LYMPHOCYTES # BLD AUTO: 1.64 X10*3/UL (ref 1.2–4.8)
LYMPHOCYTES NFR BLD AUTO: 21.8 %
MCH RBC QN AUTO: 32.2 PG (ref 26–34)
MCHC RBC AUTO-ENTMCNC: 32.8 G/DL (ref 32–36)
MCV RBC AUTO: 98 FL (ref 80–100)
MONOCYTES # BLD AUTO: 0.57 X10*3/UL (ref 0.1–1)
MONOCYTES NFR BLD AUTO: 7.6 %
NEUTROPHILS # BLD AUTO: 5.06 X10*3/UL (ref 1.2–7.7)
NEUTROPHILS NFR BLD AUTO: 67.1 %
NRBC BLD-RTO: 0 /100 WBCS (ref 0–0)
PHOSPHATE SERPL-MCNC: 3.4 MG/DL (ref 2.5–4.9)
PLATELET # BLD AUTO: 359 X10*3/UL (ref 150–450)
POTASSIUM SERPL-SCNC: 4.4 MMOL/L (ref 3.5–5.3)
PROT SERPL-MCNC: 6.8 G/DL (ref 6.4–8.2)
RBC # BLD AUTO: 4.28 X10*6/UL (ref 4–5.2)
SODIUM SERPL-SCNC: 140 MMOL/L (ref 136–145)
TSH SERPL-ACNC: 0.94 MIU/L (ref 0.44–3.98)
WBC # BLD AUTO: 7.5 X10*3/UL (ref 4.4–11.3)

## 2025-04-28 PROCEDURE — 99204 OFFICE O/P NEW MOD 45 MIN: CPT | Performed by: STUDENT IN AN ORGANIZED HEALTH CARE EDUCATION/TRAINING PROGRAM

## 2025-04-28 PROCEDURE — 1160F RVW MEDS BY RX/DR IN RCRD: CPT | Performed by: STUDENT IN AN ORGANIZED HEALTH CARE EDUCATION/TRAINING PROGRAM

## 2025-04-28 PROCEDURE — 1159F MED LIST DOCD IN RCRD: CPT | Performed by: STUDENT IN AN ORGANIZED HEALTH CARE EDUCATION/TRAINING PROGRAM

## 2025-04-28 PROCEDURE — G2211 COMPLEX E/M VISIT ADD ON: HCPCS | Performed by: STUDENT IN AN ORGANIZED HEALTH CARE EDUCATION/TRAINING PROGRAM

## 2025-04-28 RX ORDER — ALENDRONATE SODIUM 70 MG/1
70 TABLET ORAL
Qty: 51 TABLET | Refills: 0 | Status: SHIPPED | OUTPATIENT
Start: 2025-04-28 | End: 2026-04-28

## 2025-04-29 LAB
25(OH)D3 SERPL-MCNC: 56 NG/ML (ref 30–100)
ALBUMIN: 4.4 G/DL (ref 3.4–5)
ALPHA 1 GLOBULIN: 0.3 G/DL (ref 0.2–0.6)
ALPHA 2 GLOBULIN: 0.7 G/DL (ref 0.4–1.1)
BETA GLOBULIN: 0.8 G/DL (ref 0.5–1.2)
GAMMA GLOBULIN: 0.7 G/DL (ref 0.5–1.4)
PATH REVIEW-SERUM PROTEIN ELECTROPHORESIS: NORMAL
PROT SERPL-MCNC: 6.8 G/DL (ref 6.4–8.2)
PROTEIN ELECTROPHORESIS COMMENT: NORMAL
PTH-INTACT SERPL-MCNC: 57.2 PG/ML (ref 18.5–88)

## 2025-06-02 ENCOUNTER — APPOINTMENT (OUTPATIENT)
Dept: PRIMARY CARE | Facility: CLINIC | Age: 65
End: 2025-06-02
Payer: COMMERCIAL

## 2025-06-02 VITALS
HEART RATE: 81 BPM | OXYGEN SATURATION: 97 % | HEIGHT: 67 IN | SYSTOLIC BLOOD PRESSURE: 106 MMHG | RESPIRATION RATE: 18 BRPM | DIASTOLIC BLOOD PRESSURE: 75 MMHG | WEIGHT: 135 LBS | TEMPERATURE: 97 F | BODY MASS INDEX: 21.19 KG/M2

## 2025-06-02 DIAGNOSIS — F17.210 CIGARETTE NICOTINE DEPENDENCE WITHOUT COMPLICATION: ICD-10-CM

## 2025-06-02 DIAGNOSIS — K59.1 FUNCTIONAL DIARRHEA: Primary | ICD-10-CM

## 2025-06-02 DIAGNOSIS — R05.3 CHRONIC COUGH: ICD-10-CM

## 2025-06-02 PROCEDURE — 1159F MED LIST DOCD IN RCRD: CPT | Performed by: FAMILY MEDICINE

## 2025-06-02 PROCEDURE — 3008F BODY MASS INDEX DOCD: CPT | Performed by: FAMILY MEDICINE

## 2025-06-02 PROCEDURE — 99214 OFFICE O/P EST MOD 30 MIN: CPT | Performed by: FAMILY MEDICINE

## 2025-06-02 RX ORDER — VARENICLINE TARTRATE 0.5 (11)-1
0.5 KIT ORAL 2 TIMES DAILY
Qty: 30 MG | Refills: 2 | Status: SHIPPED | OUTPATIENT
Start: 2025-06-02 | End: 2025-06-02

## 2025-06-02 RX ORDER — COLESTIPOL HYDROCHLORIDE 5 G/5G
5 GRANULE, FOR SUSPENSION ORAL
Qty: 400 G | Refills: 3 | Status: SHIPPED | OUTPATIENT
Start: 2025-06-02 | End: 2025-06-02

## 2025-06-02 ASSESSMENT — ENCOUNTER SYMPTOMS
SHORTNESS OF BREATH: 0
DIARRHEA: 1
COUGH: 0

## 2025-06-02 ASSESSMENT — PATIENT HEALTH QUESTIONNAIRE - PHQ9
SUM OF ALL RESPONSES TO PHQ9 QUESTIONS 1 AND 2: 0
1. LITTLE INTEREST OR PLEASURE IN DOING THINGS: NOT AT ALL
2. FEELING DOWN, DEPRESSED OR HOPELESS: NOT AT ALL

## 2025-06-02 NOTE — ASSESSMENT & PLAN NOTE
Discussed with patient we will retry Chantix and starter pack and advance to Chantix post starter pack recheck 3 months.

## 2025-06-02 NOTE — ASSESSMENT & PLAN NOTE
Discussed with patient advised to try colestipol twice daily as directed to help with diarrhea postcholecystectomy and monitor recheck

## 2025-06-02 NOTE — ASSESSMENT & PLAN NOTE
CAT scan with slight abnormalities showing emphysema in light of smoking history will repeat and discussed

## 2025-06-02 NOTE — PATIENT INSTRUCTIONS
Manage cravings  Cravings for tobacco happen more often and it     may  take  days and weeks to  quit. The longer you go without smoking, the fewer   urges you  will have. fighting cravings can be easier if you have a plan  Know you're triggers  Make a list of things to try when the urge to smoke hits. Find what works best for you.  Keep your hands busy ...  plan an alternative  instead of picking up a cigarette. Keep hard candies, fruits and vegetables with you.    Drink more water . this helps 2+ to    flush nicotine out of your system faster. It also also reduces   that in the moment craving.  .  Keep moving. Try going for a walk or jog. Go up and down the stairs a few times. Take  dog for a walk. Physical activity even in  short bursts can help with her energy and nicotine craving.  Take slow deep breaths, refill your craving. Slowly inhale through your nose and exhale thruh your mouth. Repeat this 10 times or 2 you're feeling more relaxed.  Stay motivated.  Quitting as a process. Take it one day at a time. The first hours, days and weeks without she was can be hard. Keep a positive outlook can help you get through.  .   IT can take a person an average of 7 times before successfully quitting. Remind herself of all reason to have STARTED . Stay away from situations STIMULATING YOU  to make you want to smoke.  Reward YOURSELF  along the way celebrates your success using money saved on expensive tobacco products to buy something special. Enjoying outdoor height now that you can breathe easier. Enjoying the fact that YOU NO longer smoke tobacco. Notice that food taste better when you're not smoking or chewing tobacco.  Prevent weight gain  Not everyone will gain weight when they quit smoking. The average waking can be less than 10 pounds. To help prevent weight gain:  8 plenty of fruits and vegetables.    Adequate diet. Plan water it will help keep you fall and persistent. Get MORE  sleep. Exercise go for walks at  nighttime and daytime WHEN YOUu would otherwise have cigarette            Please consider exercise program involving walking or some other form of aerobic activity 5 days weekly for 30 minutes... Let's also consider strengthening of large muscle groups like the abdominal muscles or shoulder muscles... Twice weekly with reps of 5/10/15 exercises and gradually increase strength.. This is not heavy strength training but light weight training... Sit ups or back exercise routine.. Please ask for handout if uncertain how to do..This  will help to strengthen your muscles which in turn will help you to lose weight.... You might ask what is the best diet available.. I would strongly encourage you to consider  Weight Watchers.. And as  your  fellow on  Weight Watchers physician attempting to  live this  LIFE  style  choice with you....  I will be glad to give you recommendations on what to eat.. Consider buying Sandra bread.., luis alberto bagle thin bread.. oikos yogurt... eggs  to eat as hard-boiled... Halo top ice cream for snack... All these are delicious foods which.. when eaten and  being compliant eating three  meals daily  breakfast lunch and dinner, drinking  64 ounces of water daily we will all win together !!!!!!!. This will be a means for you to lose weight... Consider also the smart phone paola ... My plate.. Or My  fitness  pal..,  as additional possibilities for weight loss... Good  lucpriscilla Castle!    Discussed medication side effects.  The  risk benefits and treatment options  discussed with patient.       Please schedule follow-up appointment based upon your improvement/failure to improve/chronic medical conditions and physician recommendations during office appointment at the .       Patient advised to go to er if symptoms worsen or to call answering service, or to return to office for additional evaluation    This note was partially  generated using Dragon voice recognition and there may be  incorrect words, wording, spelling, or pronunciation errors that were not corrected prior to committing the note to the medical record.

## 2025-06-02 NOTE — PROGRESS NOTES
Subjective   Patient ID: Melani Bermudez is a 65 y.o. female who presents for Follow-up.  HPI  Diarrhea after  cholecystectomy..  With eating has diarrhea  and has had some accidents  as well    Smoking     and tried  starter  pack   and needs another  starter pack     Has   need for ct     for  lung cancer screening     Review of Systems   Respiratory:  Negative for cough and shortness of breath.    Gastrointestinal:  Positive for diarrhea.   All other  pertinent  systems reviewed and are negative except  those  mentioned  in HPI     Objective   Physical Exam  Vitals: I have reviewed the vitals  General: Well-developed.  In no acute distress.  Eyes:   sclera nonicteric.  Conjunctiva not injected.  No discharge.   HEAD: Normocephalic, atraumatic.  HEENT   Mucous membranes moist.  Posterior oropharynx nonerythematous, no tonsillar exudates.      No cervical lymphadenopathy.  Cardio: Regular rate and rhythm.  No murmur, rub or gallop.  Pulmonary: Lungs clear to auscultation in all fields.  No accessory muscle use.  GI/: Normal active bowel sounds.  Soft, nontender.  No masses or organomegaly appreciated.  Musculoskeletal: No gross deformities appreciated.  Neuro: Alert, age-appropriate.  Normal muscle tone.  Moving all extremities.  Skin: No rash, bruises or lesions.   Labs      Review CTA of lungs dated 10/7/2022 showed lungs are clear without focal consolidation or pulm edema the lungs are mildly hyperinflated consistent with chronic emphysema.    Review of lab testing vitamin D 56 and excellent dated 4/28/2025 with TSH 0.94 and within normal limit and CMP showing electrolytes all within normal limits glucose 90 and liver function studies within Sweat 0.8 hematocrit 42.1 BC 7 point platelets normal.  Assessment/Plan   Problem List Items Addressed This Visit       Diarrhea - Primary    Discussed with patient advised to try colestipol twice daily as directed to help with diarrhea postcholecystectomy and monitor  recheck         Relevant Medications    colestipol (Colestid) 5 gram granules    Cough    CAT scan with slight abnormalities showing emphysema in light of smoking history will repeat and discussed         Relevant Orders    CT chest wo IV contrast    Nicotine dependence    Discussed with patient we will retry Chantix and starter pack and advance to Chantix post starter pack recheck 3 months.         Relevant Medications    varenicline tartrate (Chantix Starting Month Box) 0.5 mg (11)- 1 mg (42) tablet

## (undated) DEVICE — APPLICATOR, CHLORAPREP, W/ORANGE TINT, 26ML

## (undated) DEVICE — SNARE ENDOSCP AD L240CM LOOP W10MM SHTH DIA2.4MM RND INSUL

## (undated) DEVICE — GRASPER,FORCE, BIPOLAR, DAVINCI XI

## (undated) DEVICE — APPLIER, CLIP LARGE XI

## (undated) DEVICE — CLIP, LIGATING, HEM-O-LOCK, MLX, LARGE, LF, PURPLE

## (undated) DEVICE — GLOVE, SURGICAL, PROTEXIS PI BLUE W/NEUTHERA, 8.0, PF, LF

## (undated) DEVICE — FORCEPS ENDOSCP BX OVL CUP SERR W/NEEDLE 2.3MM DIA 160CML

## (undated) DEVICE — SYRINGE, 10 CC, LUER LOCK

## (undated) DEVICE — MANIFOLD, 4 PORT NEPTUNE STANDARD

## (undated) DEVICE — HANDLE, PH, FOR YANKAUER SUCTION DEVICE

## (undated) DEVICE — GLOVE, SURGICAL, PROTEXIS PI , 7.5, PF, LF

## (undated) DEVICE — DRAPE, SHEET, ENDOSCOPY, GENERAL, FENESTRATED, ARMBOARD COVER, 98 X 123.5 IN, DISPOSABLE, LF, STERILE

## (undated) DEVICE — SUTURE, ETHIBOND XTRA, 1, 30 IN, CTX, LF

## (undated) DEVICE — SEAL, UNIVERSAL, 5-12MM

## (undated) DEVICE — CARE KIT, LAPAROSCOPIC, ADVANCED

## (undated) DEVICE — Device: Brand: ENDO SMARTCAP

## (undated) DEVICE — DRAPE, INCISE, ANTIMICROBIAL, IOBAN 2, LARGE, 17 X 23 IN, DISPOSABLE, STERILE

## (undated) DEVICE — ENDO CARRY-ON PROCEDURE KIT: Brand: ENDO CARRY-ON PROCEDURE KIT

## (undated) DEVICE — SHIELD, PRE-KLENZ, SOAK, MED 6ML

## (undated) DEVICE — FORCEPS, CADIERE, DAVINCI XI

## (undated) DEVICE — TUBING, SUCTION, 1/4" X 10', STRAIGHT: Brand: MEDLINE

## (undated) DEVICE — Device

## (undated) DEVICE — PROTECTOR, NERVE, ULNAR, PINK

## (undated) DEVICE — APPLICATOR, COTTON TIP, 6 IN, 2PK, STERILE

## (undated) DEVICE — TOWEL PACK, STERILE, 16X24, XRAY DETECTABLE, BLUE, 4/PK

## (undated) DEVICE — DRAPE, SHEET, XL

## (undated) DEVICE — HOLSTER, JET SAFETY

## (undated) DEVICE — DRAPE, COLUMN, DAVINCI XI

## (undated) DEVICE — BAG, TISSUE RETRIEVAL, 10MM, ANCHOR

## (undated) DEVICE — TUBE SET 96 MM 64 MM H2O PERISTALTIC STD AUX CHANNEL

## (undated) DEVICE — STRAP, VELCRO, BODY, 4 X 60IN, NS

## (undated) DEVICE — HOOK, CAUTERY MONOPOLAR PERM XI

## (undated) DEVICE — ADHESIVE, SKIN, DERMABOND ADVANCED, 15CM, PEN-STYLE

## (undated) DEVICE — BRUSH ENDO CLN L90.5IN SHTH DIA1.7MM BRIST DIA5-7MM 2-6MM

## (undated) DEVICE — GOWN, SURGICAL, ROYAL SILK, LG, STERILE

## (undated) DEVICE — OBTURATOR, BLADELESS , SU

## (undated) DEVICE — ADAPTER FLSH PMP FLD MGMT GI IRRIG OFP 2 DISPOSABLE

## (undated) DEVICE — DRAPE, SHEET, THREE QUARTER, FAN FOLD, 57 X 77 IN

## (undated) DEVICE — SYRINGE, 50 CC, LUER LOCK

## (undated) DEVICE — TRAP POLYP BALEEN

## (undated) DEVICE — SINGLE PORT MANIFOLD: Brand: NEPTUNE 2

## (undated) DEVICE — GOWN, SURGICAL, ROYAL SILK, XL, STERILE

## (undated) DEVICE — DRAPE, ARM XI

## (undated) DEVICE — STRAP, ARM BOARD, 32 X 1.5

## (undated) DEVICE — SUTURE, MONOCRYL, 4-0, 27 IN, PS-2, UNDYED

## (undated) DEVICE — NEEDLE, SAFETY, 22 G X 1.5 IN